# Patient Record
Sex: FEMALE | Race: BLACK OR AFRICAN AMERICAN | Employment: PART TIME | URBAN - METROPOLITAN AREA
[De-identification: names, ages, dates, MRNs, and addresses within clinical notes are randomized per-mention and may not be internally consistent; named-entity substitution may affect disease eponyms.]

---

## 2021-12-29 ENCOUNTER — OFFICE VISIT (OUTPATIENT)
Dept: OBGYN CLINIC | Age: 21
End: 2021-12-29
Payer: MEDICAID

## 2021-12-29 VITALS — WEIGHT: 115 LBS | DIASTOLIC BLOOD PRESSURE: 71 MMHG | SYSTOLIC BLOOD PRESSURE: 117 MMHG

## 2021-12-29 DIAGNOSIS — R87.612 LOW GRADE SQUAMOUS INTRAEPITHELIAL LESION ON CYTOLOGIC SMEAR OF CERVIX (LGSIL): Primary | ICD-10-CM

## 2021-12-29 PROCEDURE — 57454 BX/CURETT OF CERVIX W/SCOPE: CPT | Performed by: OBSTETRICS & GYNECOLOGY

## 2021-12-29 RX ORDER — IBUPROFEN 800 MG/1
800 TABLET ORAL
Qty: 25 TABLET | Refills: 0 | Status: SHIPPED | OUTPATIENT
Start: 2021-12-29

## 2021-12-29 NOTE — PROGRESS NOTES
Subjective:  Chief Complaints:        1. Recent abnormal pap smear. 2. Here for Colposcopy.:    HPI:         Ryann Solomon is a 24 y.o. female who came in today for colposcopy after abnormal pap smear findings. Patient is doing well today and has no complaints. ROS:  RESPIRATORY:     Negative for shortness of breath, chest pain, chest congestion, cough. CARDIOLOGY:    Negative for dizziness, chest pain, palpitations. FEMALE REPRODUCTIVE:    Negative for abnormal vaginal discharge, abnormal vaginal bleeding. UROLOGY:    Negative for difficulty urinating, voiding dysfunction, frequent urination, dysuria. Gyn History:    OB History:  OB History    Para Term  AB Living   1       1     SAB IAB Ectopic Molar Multiple Live Births     1              # Outcome Date GA Lbr Alejandro/2nd Weight Sex Delivery Anes PTL Lv   1 IAB                No current outpatient medications on file. No current facility-administered medications for this visit. Objective:  Physical Examination:  GENERAL:     General Appearance: well-appearing, well-developed, no acute distress. Hygiene: unremarkable. Mental Status:  alert and oriented. Mood/Affect:  pleasant. Speech: unremarkable. HEART:     Rhythm:  regular. Rate:  normal.  LUNGS:     Auscultation:  CTA bilaterally, no wheezing/rhonchi/rales. ABDOMEN:       Guarding:  none. Tenderness:  none. Masses:  none palpable. Inguinal nodes:  not enlarged. Ascites:  none. Bowel sounds:  normoactive. Distention:  none. Rebound tenderness:  none. RECTUM:     Anus:  no fissures, unremarkable. GENITOURINARY - FEMALE:     Vagina:  pink/moist mucosa, no gross evidence of lesions, no abnormal discharge. Cervix/cuff: normal appearance, no lesions/discharge/bleeding:  Colposcopy performed: see Procedure. External genitalia: normal.    Assessment:  The encounter diagnosis was Low grade squamous intraepithelial lesion on cytologic smear of cervix (LGSIL). Plan:  Pap smear results reviewed with pt. Colposcopy done today. Discussed importance of strict followup and to avoid smoking: Depending on severity, followup may be every 4-6 months or sooner if higher grade and may need immediate treatment. Goal is to prevent progression to Cervical Cancer. Discussed immunes system boosters such as adequate sleep; daily multivitamins, diet rich in wholesome nutritional foods and antioxidants and safe sex practices. Patient expressed understanding; All questions answered. Procedures:  Colposcopy:  Informed consent Risk, complications, benefits and alternatives discussed with patient, Consent obtained, 30 second time out taken. Pregnancy status negative. Negative Pregnancy Test.  Colposcopy procedure Acetic Acid 4-6% solution applied to cervix, Endocervical Curreting was performed, Biopsy(ies) taken at 5:30 o'clock. ,Monsels paste applied to biopsy site(S),. Post Colposcopy Hemostasis was assured, Patient tolerated the procedure well, Instructed nothing in vagina for 4-6 days, Stressed importance of strict followup, Discussed importance of NOT SMOKING. Gardasil discussed in patients under 32years old, discussed that Gardasil (HPV Vaccine) can still offer protection against up to 70% of HPV types tht cause wqrts or cervical cancer. No orders of the defined types were placed in this encounter. Preventive:  If < 32years old, discussed HPV/Cervical CA prevention; Implications of Gardasil Vaccine and May prevent infection of HPV types that cause 70% of warts or cervical dysplasias, however, Yearly PAPs with HPV DNA testing is still necessary. This can be inquired at [facility].; Discussed need to quit smoking if smoker. Discussed importance of strict followup PAPs and colposcopies as recommended.     Follow Up: Biopsy result

## 2022-01-03 LAB
CPT CODES, 490044: ABNORMAL
CPT CODES, 490044: NORMAL
CPT DISCLAIMER: ABNORMAL
CPT DISCLAIMER: NORMAL
CYTOLOGY SPEC DOC CYTO: ABNORMAL
CYTOLOGY SPEC DOC CYTO: NORMAL
DIAGNOSIS SYNOPSIS:: ABNORMAL
DIAGNOSIS SYNOPSIS:: NORMAL
DX ICD CODE: ABNORMAL
DX ICD CODE: ABNORMAL
PATH REPORT.GROSS SPEC: ABNORMAL
PATH REPORT.GROSS SPEC: NORMAL
PATH REPORT.RELEVANT HX SPEC: ABNORMAL
PATH REPORT.RELEVANT HX SPEC: NORMAL
PATHOLOGIST NAME: ABNORMAL
PATHOLOGIST NAME: NORMAL
SPECIMEN SOURCE: ABNORMAL
SPECIMEN SOURCE: NORMAL

## 2022-01-07 NOTE — PROGRESS NOTES
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 She has been scheduled for her pre-op appt on 01/20/22.

## 2022-01-12 ENCOUNTER — HOSPITAL ENCOUNTER (EMERGENCY)
Age: 22
Discharge: HOME | End: 2022-01-12
Attending: EMERGENCY MEDICINE
Payer: MEDICAID

## 2022-01-12 VITALS
HEART RATE: 68 BPM | BODY MASS INDEX: 18.83 KG/M2 | OXYGEN SATURATION: 100 % | DIASTOLIC BLOOD PRESSURE: 59 MMHG | SYSTOLIC BLOOD PRESSURE: 95 MMHG | RESPIRATION RATE: 16 BRPM | WEIGHT: 120 LBS | HEIGHT: 67 IN | TEMPERATURE: 98.4 F

## 2022-01-12 DIAGNOSIS — S69.92XA INJURY OF NAIL BED OF FINGER OF LEFT HAND, INITIAL ENCOUNTER: Primary | ICD-10-CM

## 2022-01-12 PROCEDURE — 74011250637 HC RX REV CODE- 250/637: Performed by: EMERGENCY MEDICINE

## 2022-01-12 PROCEDURE — 99283 EMERGENCY DEPT VISIT LOW MDM: CPT

## 2022-01-12 PROCEDURE — 74011000250 HC RX REV CODE- 250: Performed by: EMERGENCY MEDICINE

## 2022-01-12 RX ORDER — BACITRACIN ZINC 500 [USP'U]/G
1 CREAM TOPICAL
Status: COMPLETED | OUTPATIENT
Start: 2022-01-12 | End: 2022-01-12

## 2022-01-12 RX ORDER — HYDROCODONE BITARTRATE AND ACETAMINOPHEN 5; 325 MG/1; MG/1
1 TABLET ORAL ONCE
Status: COMPLETED | OUTPATIENT
Start: 2022-01-12 | End: 2022-01-12

## 2022-01-12 RX ORDER — BACITRACIN 500 UNIT/G
1 PACKET (EA) TOPICAL 3 TIMES DAILY
Status: DISCONTINUED | OUTPATIENT
Start: 2022-01-12 | End: 2022-01-12

## 2022-01-12 RX ORDER — BACITRACIN 500 [USP'U]/G
OINTMENT TOPICAL 3 TIMES DAILY
Qty: 1 EACH | Refills: 0 | Status: SHIPPED | OUTPATIENT
Start: 2022-01-12 | End: 2022-01-22

## 2022-01-12 RX ADMIN — Medication 1 PACKET: at 15:45

## 2022-01-12 RX ADMIN — HYDROCODONE BITARTRATE AND ACETAMINOPHEN 1 TABLET: 5; 325 TABLET ORAL at 15:32
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
## 2022-01-12 NOTE — DISCHARGE INSTRUCTIONS
You were seen in the ER for your nailbed injury. Thankfully, this was not so severe that we had to take it off and repair the nailbed. However, by treating it conservatively, there is some risk of having a nail deformity as it grows back. You should use the antibiotic ointment we have given you on the exposed nailbed for the next week and follow up with the hand surgeon we arranged for you to make sure it is healing well and does not require the nail to be completely removed. Take tylenol or ibuprofen for pain and watch out for signs of infection like worsening pain, redness, purulent discharge. Return to the ER for any of these symptoms or any other new or concerning symptoms. Thank you! Thank you for allowing me to care for you in the emergency department. I sincerely hope that you are satisfied with your visit today. It is my goal to provide you with excellent care. Below you will find a list of your labs and imaging from your visit today. Should you have any questions regarding these results please do not hesitate to call the emergency department. Labs -   No results found for this or any previous visit (from the past 12 hour(s)). Radiologic Studies -   No orders to display     CT Results  (Last 48 hours)      None          CXR Results  (Last 48 hours)      None               If you feel that you have not received excellent quality care or timely care, please ask to speak to the nurse manager. Please choose us in the future for your continued health care needs. ------------------------------------------------------------------------------------------------------------  The exam and treatment you received in the Emergency Department were for an urgent problem and are not intended as complete care.  It is important that you follow-up with a doctor, nurse practitioner, or physician assistant to:  (1) confirm your diagnosis,  (2) re-evaluation of changes in your illness and treatment, and  (3) for ongoing care. If your symptoms become worse or you do not improve as expected and you are unable to reach your usual health care provider, you should return to the Emergency Department. We are available 24 hours a day. Please take your discharge instructions with you when you go to your follow-up appointment. If you have any problem arranging a follow-up appointment, contact the Emergency Department immediately. If a prescription has been provided, please have it filled as soon as possible to prevent a delay in treatment. Read the entire medication instruction sheet provided to you by the pharmacy. If you have any questions or reservations about taking the medication due to side effects or interactions with other medications, please call your primary care physician or contact the ER to speak with the charge nurse. Make an appointment with your family doctor or the physician you were referred to for follow-up of this visit as instructed on your discharge paperwork, as this is a mandatory follow-up. Return to the ER if you are unable to be seen or if you are unable to be seen in a timely manner. If you have any problem arranging the follow-up visit, contact the Emergency Department immediately.

## 2022-01-19 ENCOUNTER — HOSPITAL ENCOUNTER (EMERGENCY)
Age: 22
Discharge: HOME | End: 2022-01-19
Attending: FAMILY MEDICINE
Payer: MEDICAID

## 2022-01-19 VITALS
WEIGHT: 120 LBS | SYSTOLIC BLOOD PRESSURE: 140 MMHG | BODY MASS INDEX: 19.29 KG/M2 | DIASTOLIC BLOOD PRESSURE: 95 MMHG | HEART RATE: 80 BPM | TEMPERATURE: 98.9 F | RESPIRATION RATE: 16 BRPM | OXYGEN SATURATION: 99 % | HEIGHT: 66 IN

## 2022-01-19 DIAGNOSIS — N30.01 ACUTE CYSTITIS WITH HEMATURIA: Primary | ICD-10-CM

## 2022-01-19 DIAGNOSIS — F41.1 ANXIETY STATE: ICD-10-CM

## 2022-01-19 LAB
ALBUMIN SERPL-MCNC: 3.9 G/DL (ref 3.5–5)
ALBUMIN/GLOB SERPL: 1 {RATIO} (ref 1.1–2.2)
ALP SERPL-CCNC: 52 U/L (ref 45–117)
ALT SERPL-CCNC: 18 U/L (ref 12–78)
ANION GAP SERPL CALC-SCNC: 14 MMOL/L (ref 5–15)
APPEARANCE UR: ABNORMAL
AST SERPL W P-5'-P-CCNC: 26 U/L (ref 15–37)
ATRIAL RATE: 55 BPM
BACTERIA URNS QL MICRO: ABNORMAL /HPF
BASOPHILS # BLD: 0 K/UL (ref 0–0.1)
BASOPHILS NFR BLD: 0 % (ref 0–1)
BILIRUB SERPL-MCNC: 1 MG/DL (ref 0.2–1)
BILIRUB UR QL: ABNORMAL
BUN SERPL-MCNC: 18 MG/DL (ref 6–20)
BUN/CREAT SERPL: 19 (ref 12–20)
CA-I BLD-MCNC: 8.6 MG/DL (ref 8.5–10.1)
CALCULATED P AXIS, ECG09: 54 DEGREES
CALCULATED R AXIS, ECG10: 59 DEGREES
CALCULATED T AXIS, ECG11: 51 DEGREES
CHLORIDE SERPL-SCNC: 100 MMOL/L (ref 97–108)
CO2 SERPL-SCNC: 21 MMOL/L (ref 21–32)
COLOR UR: ABNORMAL
CREAT SERPL-MCNC: 0.95 MG/DL (ref 0.55–1.02)
DIAGNOSIS, 93000: NORMAL
DIFFERENTIAL METHOD BLD: NORMAL
EOSINOPHIL # BLD: 0 K/UL (ref 0–0.4)
EOSINOPHIL NFR BLD: 0 % (ref 0–7)
EPITH CASTS URNS QL MICRO: ABNORMAL /LPF
ERYTHROCYTE [DISTWIDTH] IN BLOOD BY AUTOMATED COUNT: 12.5 % (ref 11.5–14.5)
GLOBULIN SER CALC-MCNC: 4.1 G/DL (ref 2–4)
GLUCOSE SERPL-MCNC: 79 MG/DL (ref 65–100)
GLUCOSE UR STRIP.AUTO-MCNC: NEGATIVE MG/DL
HCG UR QL: NEGATIVE
HCT VFR BLD AUTO: 38.8 % (ref 35–47)
HGB BLD-MCNC: 13.3 G/DL (ref 11.5–16)
HGB UR QL STRIP: ABNORMAL
IMM GRANULOCYTES # BLD AUTO: 0 K/UL (ref 0–0.04)
IMM GRANULOCYTES NFR BLD AUTO: 0 % (ref 0–0.5)
KETONES UR QL STRIP.AUTO: >80 MG/DL
LEUKOCYTE ESTERASE UR QL STRIP.AUTO: ABNORMAL
LYMPHOCYTES # BLD: 2 K/UL (ref 0.8–3.5)
LYMPHOCYTES NFR BLD: 29 % (ref 12–49)
MCH RBC QN AUTO: 33.8 PG (ref 26–34)
MCHC RBC AUTO-ENTMCNC: 34.3 G/DL (ref 30–36.5)
MCV RBC AUTO: 98.7 FL (ref 80–99)
MONOCYTES # BLD: 0.3 K/UL (ref 0–1)
MONOCYTES NFR BLD: 5 % (ref 5–13)
NEUTS SEG # BLD: 4.7 K/UL (ref 1.8–8)
NEUTS SEG NFR BLD: 66 % (ref 32–75)
NITRITE UR QL STRIP.AUTO: POSITIVE
P-R INTERVAL, ECG05: 134 MS
PH UR STRIP: 5 [PH] (ref 5–8)
PLATELET # BLD AUTO: 242 K/UL (ref 150–400)
PMV BLD AUTO: 9.3 FL (ref 8.9–12.9)
POTASSIUM SERPL-SCNC: 4.2 MMOL/L (ref 3.5–5.1)
PROT SERPL-MCNC: 8 G/DL (ref 6.4–8.2)
PROT UR STRIP-MCNC: >300 MG/DL
Q-T INTERVAL, ECG07: 420 MS
QRS DURATION, ECG06: 74 MS
QTC CALCULATION (BEZET), ECG08: 401 MS
RBC # BLD AUTO: 3.93 M/UL (ref 3.8–5.2)
RBC #/AREA URNS HPF: ABNORMAL /HPF (ref 0–5)
SODIUM SERPL-SCNC: 135 MMOL/L (ref 136–145)
SP GR UR REFRACTOMETRY: 1.02 (ref 1–1.03)
UROBILINOGEN UR QL STRIP.AUTO: 1 EU/DL (ref 0.2–1)
VENTRICULAR RATE, ECG03: 55 BPM
WBC # BLD AUTO: 7.1 K/UL (ref 3.6–11)
WBC URNS QL MICRO: ABNORMAL /HPF (ref 0–4)

## 2022-01-19 PROCEDURE — 93005 ELECTROCARDIOGRAM TRACING: CPT

## 2022-01-19 PROCEDURE — 99285 EMERGENCY DEPT VISIT HI MDM: CPT

## 2022-01-19 PROCEDURE — 80053 COMPREHEN METABOLIC PANEL: CPT

## 2022-01-19 PROCEDURE — 74011000250 HC RX REV CODE- 250: Performed by: FAMILY MEDICINE

## 2022-01-19 PROCEDURE — 85025 COMPLETE CBC W/AUTO DIFF WBC: CPT

## 2022-01-19 PROCEDURE — 81001 URINALYSIS AUTO W/SCOPE: CPT

## 2022-01-19 PROCEDURE — 96374 THER/PROPH/DIAG INJ IV PUSH: CPT

## 2022-01-19 PROCEDURE — 81025 URINE PREGNANCY TEST: CPT

## 2022-01-19 PROCEDURE — 36415 COLL VENOUS BLD VENIPUNCTURE: CPT

## 2022-01-19 PROCEDURE — 96361 HYDRATE IV INFUSION ADD-ON: CPT

## 2022-01-19 PROCEDURE — 74011250636 HC RX REV CODE- 250/636: Performed by: FAMILY MEDICINE

## 2022-01-19 PROCEDURE — 74011250637 HC RX REV CODE- 250/637: Performed by: FAMILY MEDICINE

## 2022-01-19 RX ORDER — CEPHALEXIN 500 MG/1
500 CAPSULE ORAL 2 TIMES DAILY
Qty: 10 CAPSULE | Refills: 0 | Status: SHIPPED | OUTPATIENT
Start: 2022-01-19 | End: 2022-01-24

## 2022-01-19 RX ORDER — HYDROXYZINE 50 MG/1
50 TABLET, FILM COATED ORAL
Qty: 15 TABLET | Refills: 0 | Status: SHIPPED | OUTPATIENT
Start: 2022-01-19 | End: 2022-01-29

## 2022-01-19 RX ORDER — ONDANSETRON 2 MG/ML
4 INJECTION INTRAMUSCULAR; INTRAVENOUS
Status: COMPLETED | OUTPATIENT
Start: 2022-01-19 | End: 2022-01-19

## 2022-01-19 RX ORDER — LIDOCAINE HYDROCHLORIDE 20 MG/ML
15 SOLUTION OROPHARYNGEAL AS NEEDED
Status: DISCONTINUED | OUTPATIENT
Start: 2022-01-19 | End: 2022-01-19 | Stop reason: HOSPADM

## 2022-01-19 RX ORDER — MAG HYDROX/ALUMINUM HYD/SIMETH 200-200-20
30 SUSPENSION, ORAL (FINAL DOSE FORM) ORAL
Status: DISCONTINUED | OUTPATIENT
Start: 2022-01-19 | End: 2022-01-19 | Stop reason: HOSPADM

## 2022-01-19 RX ORDER — HYDROXYZINE 25 MG/1
25 TABLET, FILM COATED ORAL ONCE
Status: COMPLETED | OUTPATIENT
Start: 2022-01-19 | End: 2022-01-19

## 2022-01-19 RX ORDER — BICTEGRAVIR SODIUM, EMTRICITABINE, AND TENOFOVIR ALAFENAMIDE FUMARATE 50; 200; 25 MG/1; MG/1; MG/1
1 TABLET ORAL DAILY
COMMUNITY
Start: 2021-12-15

## 2022-01-19 RX ADMIN — SODIUM CHLORIDE 1000 ML: 9 INJECTION, SOLUTION INTRAVENOUS at 13:28

## 2022-01-19 RX ADMIN — LIDOCAINE HYDROCHLORIDE 15 ML: 20 SOLUTION ORAL; TOPICAL at 13:45

## 2022-01-19 RX ADMIN — HYDROXYZINE HYDROCHLORIDE 25 MG: 25 TABLET, FILM COATED ORAL at 15:05

## 2022-01-19 RX ADMIN — ONDANSETRON 4 MG: 2 INJECTION INTRAMUSCULAR; INTRAVENOUS at 13:44

## 2022-01-19 RX ADMIN — ALUMINUM HYDROXIDE, MAGNESIUM HYDROXIDE, AND SIMETHICONE 30 ML: 200; 200; 20 SUSPENSION ORAL at 13:45

## 2022-01-19 NOTE — DISCHARGE INSTRUCTIONS
Thank you! Thank you for allowing me to care for you in the emergency department. I sincerely hope that you are satisfied with your visit today. It is my goal to provide you with excellent care. Below you will find a list of your labs and imaging from your visit today. Should you have any questions regarding these results please do not hesitate to call the emergency department. Labs -     Recent Results (from the past 12 hour(s))   CBC WITH AUTOMATED DIFF    Collection Time: 01/19/22  1:25 PM   Result Value Ref Range    WBC 7.1 3.6 - 11.0 K/uL    RBC 3.93 3.80 - 5.20 M/uL    HGB 13.3 11.5 - 16.0 g/dL    HCT 38.8 35.0 - 47.0 %    MCV 98.7 80.0 - 99.0 FL    MCH 33.8 26.0 - 34.0 PG    MCHC 34.3 30.0 - 36.5 g/dL    RDW 12.5 11.5 - 14.5 %    PLATELET 167 523 - 475 K/uL    MPV 9.3 8.9 - 12.9 FL    NEUTROPHILS 66 32 - 75 %    LYMPHOCYTES 29 12 - 49 %    MONOCYTES 5 5 - 13 %    EOSINOPHILS 0 0 - 7 %    BASOPHILS 0 0 - 1 %    IMMATURE GRANULOCYTES 0 0.0 - 0.5 %    ABS. NEUTROPHILS 4.7 1.8 - 8.0 K/UL    ABS. LYMPHOCYTES 2.0 0.8 - 3.5 K/UL    ABS. MONOCYTES 0.3 0.0 - 1.0 K/UL    ABS. EOSINOPHILS 0.0 0.0 - 0.4 K/UL    ABS. BASOPHILS 0.0 0.0 - 0.1 K/UL    ABS. IMM. GRANS. 0.0 0.00 - 0.04 K/UL    DF AUTOMATED     METABOLIC PANEL, COMPREHENSIVE    Collection Time: 01/19/22  1:25 PM   Result Value Ref Range    Sodium 135 (L) 136 - 145 mmol/L    Potassium 4.2 3.5 - 5.1 mmol/L    Chloride 100 97 - 108 mmol/L    CO2 21 21 - 32 mmol/L    Anion gap 14 5 - 15 mmol/L    Glucose 79 65 - 100 mg/dL    BUN 18 6 - 20 mg/dL    Creatinine 0.95 0.55 - 1.02 mg/dL    BUN/Creatinine ratio 19 12 - 20      GFR est AA >60 >60 ml/min/1.73m2    GFR est non-AA >60 >60 ml/min/1.73m2    Calcium 8.6 8.5 - 10.1 mg/dL    Bilirubin, total 1.0 0.2 - 1.0 mg/dL    AST (SGOT) 26 15 - 37 U/L    ALT (SGPT) 18 12 - 78 U/L    Alk.  phosphatase 52 45 - 117 U/L    Protein, total 8.0 6.4 - 8.2 g/dL    Albumin 3.9 3.5 - 5.0 g/dL    Globulin 4.1 (H) 2.0 - 4.0 g/dL A-G Ratio 1.0 (L) 1.1 - 2.2     URINALYSIS W/ RFLX MICROSCOPIC    Collection Time: 01/19/22  2:57 PM   Result Value Ref Range    Color Red      Appearance Cloudy (A) Clear      Specific gravity 1.020 1.003 - 1.030      pH (UA) 5.0 5.0 - 8.0      Protein >300 (A) Negative mg/dL    Glucose Negative Negative mg/dL    Ketone >80 (A) Negative mg/dL    Bilirubin Small (A) Negative      Blood Large (A) Negative      Urobilinogen 1.0 0.2 - 1.0 EU/dL    Nitrites Positive (A) Negative      Leukocyte Esterase Small (A) Negative     HCG URINE, QL    Collection Time: 01/19/22  2:57 PM   Result Value Ref Range    HCG urine, QL Negative Negative     URINE MICROSCOPIC    Collection Time: 01/19/22  2:57 PM   Result Value Ref Range    WBC 5-10 0 - 4 /hpf    RBC  0 - 5 /hpf    Epithelial cells Moderate (A) Few /lpf    Bacteria 2+ (A) Negative /hpf       Radiologic Studies -   No orders to display     CT Results  (Last 48 hours)      None          CXR Results  (Last 48 hours)      None               If you feel that you have not received excellent quality care or timely care, please ask to speak to the nurse manager. Please choose us in the future for your continued health care needs. ------------------------------------------------------------------------------------------------------------  The exam and treatment you received in the Emergency Department were for an urgent problem and are not intended as complete care. It is important that you follow-up with a doctor, nurse practitioner, or physician assistant to:  (1) confirm your diagnosis,  (2) re-evaluation of changes in your illness and treatment, and  (3) for ongoing care. If your symptoms become worse or you do not improve as expected and you are unable to reach your usual health care provider, you should return to the Emergency Department. We are available 24 hours a day.      Please take your discharge instructions with you when you go to your follow-up appointment. If you have any problem arranging a follow-up appointment, contact the Emergency Department immediately. If a prescription has been provided, please have it filled as soon as possible to prevent a delay in treatment. Read the entire medication instruction sheet provided to you by the pharmacy. If you have any questions or reservations about taking the medication due to side effects or interactions with other medications, please call your primary care physician or contact the ER to speak with the charge nurse. Make an appointment with your family doctor or the physician you were referred to for follow-up of this visit as instructed on your discharge paperwork, as this is a mandatory follow-up. Return to the ER if you are unable to be seen or if you are unable to be seen in a timely manner. If you have any problem arranging the follow-up visit, contact the Emergency Department immediately.
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
## 2022-02-25 ENCOUNTER — HOSPITAL ENCOUNTER (EMERGENCY)
Age: 22
Discharge: HOME | End: 2022-02-25
Attending: FAMILY MEDICINE
Payer: MEDICAID

## 2022-02-25 VITALS
RESPIRATION RATE: 18 BRPM | WEIGHT: 114.2 LBS | BODY MASS INDEX: 17.92 KG/M2 | DIASTOLIC BLOOD PRESSURE: 72 MMHG | SYSTOLIC BLOOD PRESSURE: 105 MMHG | HEART RATE: 70 BPM | OXYGEN SATURATION: 100 % | HEIGHT: 67 IN | TEMPERATURE: 98.1 F

## 2022-02-25 DIAGNOSIS — T14.8XXA SPLINTER: Primary | ICD-10-CM

## 2022-02-25 PROCEDURE — 99282 EMERGENCY DEPT VISIT SF MDM: CPT

## 2022-02-25 NOTE — ED PROVIDER NOTES
EMERGENCY DEPARTMENT HISTORY AND PHYSICAL EXAM      Date: 2/25/2022  Patient Name: Lovely Kussmaul    History of Presenting Illness     Chief complaint: Splinter  History Provided By:     HPI: Lovely Kussmaul, is an extremely pleasant 25 y.o. female presenting to the ED with a chief complaint of splinter. Patient states prior to arrival she is dropped on a piece of wood and noticed a splinter in the heel of her right foot. Difficulty getting it out. Denies other complaints. There are no other complaints, changes, or physical findings at this time. PCP: Narda Hager NP    No current facility-administered medications on file prior to encounter. Current Outpatient Medications on File Prior to Encounter   Medication Sig Dispense Refill    Biktarvy tab tablet Take 1 Tablet by mouth daily.  ibuprofen (MOTRIN) 800 mg tablet Take 1 Tablet by mouth every six (6) hours as needed for Pain. 25 Tablet 0       Past History     Past Medical History:  Past Medical History:   Diagnosis Date    HIV (human immunodeficiency virus infection) ([facility] [address]        Past Surgical History:  History reviewed. No pertinent surgical history. Family History:  History reviewed. No pertinent family history. Social History:  Social History     Tobacco Use    Smoking status: Never Smoker    Smokeless tobacco: Never Used   Substance Use Topics    Alcohol use: Not on file    Drug use: Yes     Types: Marijuana     Comment: daily       Allergies:  No Known Allergies      Review of Systems     Review of Systems   Constitutional: Negative for activity change, appetite change, chills, fatigue and fever. HENT: Negative for congestion and sore throat. Eyes: Negative for photophobia and visual disturbance. Respiratory: Negative for cough, shortness of breath and wheezing. Cardiovascular: Negative for chest pain, palpitations and leg swelling. Gastrointestinal: Negative for abdominal pain, diarrhea, nausea and vomiting. Endocrine: Negative for cold intolerance and heat intolerance. Musculoskeletal: Negative for gait problem and joint swelling. Skin: Negative for color change and rash. Splinter   Neurological: Negative for dizziness and headaches. Physical Exam     Physical Exam  Constitutional:       Appearance: She is well-developed. HENT:      Head: Normocephalic and atraumatic. Mouth/Throat:      Mouth: Mucous membranes are moist.      Pharynx: Oropharynx is clear. Eyes:      Conjunctiva/sclera: Conjunctivae normal.      Pupils: Pupils are equal, round, and reactive to light. Cardiovascular:      Rate and Rhythm: Normal rate and regular rhythm. Heart sounds: No murmur heard. Pulmonary:      Effort: No respiratory distress. Breath sounds: No stridor. No wheezing, rhonchi or rales. Abdominal:      General: There is no distension. Tenderness: There is no abdominal tenderness. There is no rebound. Musculoskeletal:      Cervical back: Normal range of motion and neck supple. Skin:     General: Skin is warm and dry. Comments: 5 mm x 1 mm splinter right heel superficial   Neurological:      General: No focal deficit present. Mental Status: She is alert and oriented to person, place, and time. Psychiatric:         Mood and Affect: Mood normal.         Behavior: Behavior normal.         Lab and Diagnostic Study Results     Labs -   No results found for this or any previous visit (from the past 12 hour(s)). Radiologic Studies -   @lastxrresult@  CT Results  (Last 48 hours)    None        CXR Results  (Last 48 hours)    None            Medical Decision Making   - I am the first provider for this patient. - I reviewed the vital signs, available nursing notes, past medical history, past surgical history, family history and social history. - Initial assessment performed.  The patients presenting problems have been discussed, and they are in agreement with the care plan formulated and outlined with them. I have encouraged them to ask questions as they arise throughout their visit. Vital Signs-Reviewed the patient's vital signs. Patient Vitals for the past 12 hrs:   Temp Pulse Resp BP SpO2   02/25/22 1036 98.1 °F (36.7 °C) 70 18 105/72 100 %         ED Course/ Provider Notes (Medical Decision Making):     Patient presented to the emergency department with a chief complaint of splinter. On examination the patient is nontoxic and well-appearing. Vitals were reviewed per above. Splinter was removed with tweezers. Patient tolerated this well. Henri Marquez was given a thorough list of signs and symptoms that would warrant an immediate return to the emergency department. Otherwise Henri Marquez will follow up with PCP. Procedures   Medical Decision Makingedical Decision Making  Performed by: Juliocesar Rodriguez, DO  Procedures  None       Disposition   Disposition:     Home    All of the diagnostic tests were reviewed and questions answered. Diagnosis, care plan and treatment options were discussed. The patient understands the instructions and will follow up as directed. The patients results have been reviewed with them. They have been counseled regarding their diagnosis. The patient verbally convey understanding and agreement of the signs, symptoms, diagnosis, treatment and prognosis and additionally agrees to follow up as recommended with their PCP in 24 - 48 hours. They also agree with the care-plan and convey that all of their questions have been answered. I have also put together some discharge instructions for them that include: 1) educational information regarding their diagnosis, 2) how to care for their diagnosis at home, as well a 3) list of reasons why they would want to return to the ED prior to their follow-up appointment, should their condition change. DISCHARGE PLAN:    1.    Current Discharge Medication List      CONTINUE these medications which have NOT CHANGED    Details   Biktarvy tab tablet Take 1 Tablet by mouth daily. ibuprofen (MOTRIN) 800 mg tablet Take 1 Tablet by mouth every six (6) hours as needed for Pain. Qty: 25 Tablet, Refills: 0               2.   Follow-up Information     Follow up With Specialties Details Why Contact Info    General Johanna NP Nurse Practitioner               3.  Return to ED if worse       4. Current Discharge Medication List            Diagnosis     Clinical Impression:    1. Splinter        Attestations:    Lesly Monson, DO    Please note that this dictation was completed with [employer], the computer voice recognition software. Quite often unanticipated grammatical, syntax, homophones, and other interpretive errors are inadvertently transcribed by the computer software. Please disregard these errors. Please excuse any errors that have escaped final proofreading. Thank you.

## 2022-02-25 NOTE — Clinical Note
[address] [facility] EMERGENCY DEPARTMENT  [address] [id number]  [phone]    Work/School Note    Date: 2/25/2022    To Whom It May concern:      Anette Kelly was seen and treated today in the emergency room by the following provider(s):  Attending Provider: Carroll Worthy is excused from work/school on 02/25/22. She is clear to return to work/school on 02/26/22.         Sincerely,          Gibran Wesley, DO

## 2022-06-16 ENCOUNTER — HOSPITAL ENCOUNTER (EMERGENCY)
Age: 22
Discharge: HOME | End: 2022-06-16
Attending: EMERGENCY MEDICINE
Payer: MEDICAID

## 2022-06-16 VITALS
HEIGHT: 67 IN | WEIGHT: 118.8 LBS | SYSTOLIC BLOOD PRESSURE: 102 MMHG | RESPIRATION RATE: 18 BRPM | OXYGEN SATURATION: 100 % | TEMPERATURE: 98.3 F | HEART RATE: 69 BPM | BODY MASS INDEX: 18.65 KG/M2 | DIASTOLIC BLOOD PRESSURE: 72 MMHG

## 2022-06-16 DIAGNOSIS — S90.851A SPLINTER OF RIGHT FOOT WITHOUT INFECTION, INITIAL ENCOUNTER: Primary | ICD-10-CM

## 2022-06-16 PROCEDURE — 99282 EMERGENCY DEPT VISIT SF MDM: CPT

## 2022-06-16 PROCEDURE — 74011000250 HC RX REV CODE- 250: Performed by: EMERGENCY MEDICINE

## 2022-06-16 RX ORDER — LIDOCAINE HYDROCHLORIDE AND EPINEPHRINE 10; 10 MG/ML; UG/ML
1.5 INJECTION, SOLUTION INFILTRATION; PERINEURAL ONCE
Status: COMPLETED | OUTPATIENT
Start: 2022-06-16 | End: 2022-06-16

## 2022-06-16 RX ADMIN — LIDOCAINE HYDROCHLORIDE,EPINEPHRINE BITARTRATE 15 MG: 10; .01 INJECTION, SOLUTION INFILTRATION; PERINEURAL at 11:27

## 2022-06-16 NOTE — ED PROVIDER NOTES
EMERGENCY DEPARTMENT HISTORY AND PHYSICAL EXAM      Date: 6/16/2022  Patient Name: Jj Inman  Patient Age and Sex: 25 y.o. female     History of Presenting Illness     Chief Complaint   Patient presents with    Foreign Body Removal       History Provided By: Patient    HPI: Jj Inmna is a 45-year-old female presenting with splinters in her right foot. Patient states that 3 days ago she was walking with flip-flops outside and then felt the pain and states that she has 2 small wooden splinters in her lower foot. Patient states that she tried to get it out but was unable to. Patient states that she has had splinters in her foot before as recently as February and they had to numb it up and cut it out. Patient states that she feels it in that area. This did happen 3 days ago. There are no other complaints, changes, or physical findings at this time. PCP: Elizabeth Nuñez NP    No current facility-administered medications on file prior to encounter. Current Outpatient Medications on File Prior to Encounter   Medication Sig Dispense Refill    Biktarvy tab tablet Take 1 Tablet by mouth daily.  ibuprofen (MOTRIN) 800 mg tablet Take 1 Tablet by mouth every six (6) hours as needed for Pain. 25 Tablet 0       Past History     Past Medical History:  Past Medical History:   Diagnosis Date    HIV (human immunodeficiency virus infection) ([facility] [address]        Past Surgical History:  History reviewed. No pertinent surgical history. Family History:  History reviewed. No pertinent family history. Social History:  Social History     Tobacco Use    Smoking status: Never Smoker    Smokeless tobacco: Never Used   Substance Use Topics    Alcohol use: Never    Drug use: Yes     Types: Marijuana     Comment: daily       Allergies:  No Known Allergies      Review of Systems   Review of Systems   Constitutional: Negative for chills and fever. Respiratory: Negative for cough and shortness of breath. Cardiovascular: Negative for chest pain. Gastrointestinal: Negative for abdominal pain, constipation, diarrhea, nausea and vomiting. Genitourinary: Negative for dysuria, frequency and hematuria. Skin: Positive for wound. Neurological: Negative for weakness and numbness. All other systems reviewed and are negative. Physical Exam   Physical Exam  Constitutional:       General: She is not in acute distress. Appearance: She is well-developed. HENT:      Head: Normocephalic and atraumatic. Nose: Nose normal.      Mouth/Throat:      Mouth: Mucous membranes are moist.   Eyes:      Extraocular Movements: Extraocular movements intact. Conjunctiva/sclera: Conjunctivae normal.   Cardiovascular:      Comments: Well perfused  Pulmonary:      Effort: Pulmonary effort is normal. No respiratory distress. Musculoskeletal:         General: Normal range of motion. Cervical back: Normal range of motion. Skin:     Comments: On the bottom he will of the right foot, there are two 5 mm horizontal reddish flecks under the skin. Cannot feel a splinter necessarily. The red linear flecks could be scar versus the splinter. Tender to palpation. When attempted to remove with tweezers, patient pulling her foot away and not letting me touch it. Neurological:      General: No focal deficit present. Mental Status: She is alert and oriented to person, place, and time. Psychiatric:         Mood and Affect: Mood normal.          Diagnostic Study Results     Labs -   No results found for this or any previous visit (from the past 12 hour(s)). Radiologic Studies -   No orders to display     CT Results  (Last 48 hours)    None        CXR Results  (Last 48 hours)    None            Medical Decision Making   I am the first provider for this patient. I reviewed the vital signs, available nursing notes, past medical history, past surgical history, family history and social history.     Vital Signs-Reviewed the patient's vital signs. Patient Vitals for the past 12 hrs:   Temp Pulse Resp BP SpO2   06/16/22 1112 98.3 °F (36.8 °C) 69 18 102/72 100 %       Records Reviewed: Nursing Notes and Old Medical Records    Provider Notes (Medical Decision Making):   Patient presenting for 2 splinters in her right foot from 3 days ago. The flex that I see on her skin could be scarring or the actual splinter. After discussion with the patient, plan will be to numb up those 2 areas and see if we can use a scalpel to take it out. If unable to, it might have already healed over and buried in the skin. ED Course:   Initial assessment performed. The patients presenting problems have been discussed, and they are in agreement with the care plan formulated and outlined with them. I have encouraged them to ask questions as they arise throughout their visit. ED Course as of 06/16/22 1143   Thu Jun 16, 2022   1133 Lidocaine with epi was applied to one area of the splinter and patient screamed and wanted me to stop. Stated that she did not want me to continue. Does not want me to use a scalpel to remove the buried splinter. Patient has capacity to make her own decisions and verbalized that she will try to use tweezers on her own but does not want me to continue. Marcela Sam, nurse in room and was present for the conversation. [JS]      ED Course User Index  [JS] Anusha Reddy MD     Critical Care Time:   0    Disposition:  Discharge Note:  The patient has been re-evaluated and is ready for discharge. Reviewed available results with patient. Counseled patient on diagnosis and care plan. Patient has expressed understanding, and all questions have been answered. Patient agrees with plan and agrees to follow up as recommended, or to return to the ED if their symptoms worsen. Discharge instructions have been provided and explained to the patient, along with reasons to return to the ED.       PLAN:  Discharge Medication List as of 6/16/2022 11:36 AM        2. Follow-up Information     Follow up With Specialties Details Why Contact Info    Yovani Dudley, NP Nurse Practitioner  As needed         3. Return to ED if worse     Diagnosis     Clinical Impression:   1. Splinter of right foot without infection, initial encounter        Attestations:    Johana Junior M.D. Please note that this dictation was completed with [employer], the computer voice recognition software. Quite often unanticipated grammatical, syntax, homophones, and other interpretive errors are inadvertently transcribed by the computer software. Please disregard these errors. Please excuse any errors that have escaped final proofreading. Thank you.

## 2022-08-19 ENCOUNTER — HOSPITAL ENCOUNTER (EMERGENCY)
Age: 22
Discharge: HOME | End: 2022-08-19
Attending: EMERGENCY MEDICINE
Payer: MEDICAID

## 2022-08-19 ENCOUNTER — APPOINTMENT (OUTPATIENT)
Dept: CT IMAGING | Age: 22
End: 2022-08-19
Attending: EMERGENCY MEDICINE
Payer: MEDICAID

## 2022-08-19 VITALS
RESPIRATION RATE: 15 BRPM | BODY MASS INDEX: 18.05 KG/M2 | SYSTOLIC BLOOD PRESSURE: 144 MMHG | TEMPERATURE: 98.4 F | HEIGHT: 67 IN | WEIGHT: 115 LBS | OXYGEN SATURATION: 99 % | HEART RATE: 68 BPM | DIASTOLIC BLOOD PRESSURE: 88 MMHG

## 2022-08-19 DIAGNOSIS — R10.9 FLANK PAIN: Primary | ICD-10-CM

## 2022-08-19 DIAGNOSIS — K59.00 CONSTIPATION, UNSPECIFIED CONSTIPATION TYPE: ICD-10-CM

## 2022-08-19 LAB
ALBUMIN SERPL-MCNC: 4.5 G/DL (ref 3.5–5)
ALBUMIN/GLOB SERPL: 1.1 {RATIO} (ref 1.1–2.2)
ALP SERPL-CCNC: 67 U/L (ref 45–117)
ALT SERPL-CCNC: 26 U/L (ref 12–78)
ANION GAP SERPL CALC-SCNC: 12 MMOL/L (ref 5–15)
APPEARANCE UR: CLEAR
AST SERPL W P-5'-P-CCNC: 27 U/L (ref 15–37)
BACTERIA URNS QL MICRO: ABNORMAL /HPF
BASOPHILS # BLD: 0 K/UL (ref 0–0.1)
BASOPHILS NFR BLD: 0 % (ref 0–1)
BILIRUB SERPL-MCNC: 0.8 MG/DL (ref 0.2–1)
BILIRUB UR QL: ABNORMAL
BUN SERPL-MCNC: 14 MG/DL (ref 6–20)
BUN/CREAT SERPL: 16 (ref 12–20)
CA-I BLD-MCNC: 9.7 MG/DL (ref 8.5–10.1)
CHLORIDE SERPL-SCNC: 102 MMOL/L (ref 97–108)
CO2 SERPL-SCNC: 24 MMOL/L (ref 21–32)
COLOR UR: YELLOW
CREAT SERPL-MCNC: 0.87 MG/DL (ref 0.55–1.02)
DIFFERENTIAL METHOD BLD: ABNORMAL
EOSINOPHIL # BLD: 0 K/UL (ref 0–0.4)
EOSINOPHIL NFR BLD: 0 % (ref 0–7)
EPITH CASTS URNS QL MICRO: ABNORMAL /LPF
ERYTHROCYTE [DISTWIDTH] IN BLOOD BY AUTOMATED COUNT: 12.9 % (ref 11.5–14.5)
GLOBULIN SER CALC-MCNC: 4 G/DL (ref 2–4)
GLUCOSE SERPL-MCNC: 107 MG/DL (ref 65–100)
GLUCOSE UR STRIP.AUTO-MCNC: NEGATIVE MG/DL
HCG UR QL: NEGATIVE
HCT VFR BLD AUTO: 39 % (ref 35–47)
HGB BLD-MCNC: 13.1 G/DL (ref 11.5–16)
HGB UR QL STRIP: ABNORMAL
IMM GRANULOCYTES # BLD AUTO: 0 K/UL (ref 0–0.04)
IMM GRANULOCYTES NFR BLD AUTO: 0 % (ref 0–0.5)
KETONES UR QL STRIP.AUTO: 50 MG/DL
LEUKOCYTE ESTERASE UR QL STRIP.AUTO: ABNORMAL
LYMPHOCYTES # BLD: 1.4 K/UL (ref 0.8–3.5)
LYMPHOCYTES NFR BLD: 17 % (ref 12–49)
MCH RBC QN AUTO: 34.1 PG (ref 26–34)
MCHC RBC AUTO-ENTMCNC: 33.6 G/DL (ref 30–36.5)
MCV RBC AUTO: 101.6 FL (ref 80–99)
MONOCYTES # BLD: 0.3 K/UL (ref 0–1)
MONOCYTES NFR BLD: 4 % (ref 5–13)
MUCOUS THREADS URNS QL MICRO: ABNORMAL /LPF
NEUTS SEG # BLD: 6.3 K/UL (ref 1.8–8)
NEUTS SEG NFR BLD: 79 % (ref 32–75)
NITRITE UR QL STRIP.AUTO: NEGATIVE
PH UR STRIP: 5 [PH] (ref 5–8)
PLATELET # BLD AUTO: 268 K/UL (ref 150–400)
PMV BLD AUTO: 9.7 FL (ref 8.9–12.9)
POTASSIUM SERPL-SCNC: 4.1 MMOL/L (ref 3.5–5.1)
PROT SERPL-MCNC: 8.5 G/DL (ref 6.4–8.2)
PROT UR STRIP-MCNC: 30 MG/DL
RBC # BLD AUTO: 3.84 M/UL (ref 3.8–5.2)
RBC #/AREA URNS HPF: ABNORMAL /HPF (ref 0–5)
SODIUM SERPL-SCNC: 138 MMOL/L (ref 136–145)
SP GR UR REFRACTOMETRY: 1.02 (ref 1–1.03)
UA: UC IF INDICATED,UAUC: ABNORMAL
UROBILINOGEN UR QL STRIP.AUTO: 1 EU/DL (ref 0.2–1)
WBC # BLD AUTO: 8 K/UL (ref 3.6–11)
WBC URNS QL MICRO: ABNORMAL /HPF (ref 0–4)

## 2022-08-19 PROCEDURE — 81025 URINE PREGNANCY TEST: CPT

## 2022-08-19 PROCEDURE — 74176 CT ABD & PELVIS W/O CONTRAST: CPT

## 2022-08-19 PROCEDURE — 96375 TX/PRO/DX INJ NEW DRUG ADDON: CPT

## 2022-08-19 PROCEDURE — 96374 THER/PROPH/DIAG INJ IV PUSH: CPT

## 2022-08-19 PROCEDURE — 74011250636 HC RX REV CODE- 250/636: Performed by: EMERGENCY MEDICINE

## 2022-08-19 PROCEDURE — 81001 URINALYSIS AUTO W/SCOPE: CPT

## 2022-08-19 PROCEDURE — 80053 COMPREHEN METABOLIC PANEL: CPT

## 2022-08-19 PROCEDURE — 85025 COMPLETE CBC W/AUTO DIFF WBC: CPT

## 2022-08-19 PROCEDURE — 99284 EMERGENCY DEPT VISIT MOD MDM: CPT

## 2022-08-19 RX ORDER — ONDANSETRON 2 MG/ML
4 INJECTION INTRAMUSCULAR; INTRAVENOUS ONCE
Status: COMPLETED | OUTPATIENT
Start: 2022-08-19 | End: 2022-08-19

## 2022-08-19 RX ORDER — KETOROLAC TROMETHAMINE 10 MG/1
10 TABLET, FILM COATED ORAL
Qty: 8 TABLET | Refills: 0 | Status: SHIPPED | OUTPATIENT
Start: 2022-08-19 | End: 2022-08-21

## 2022-08-19 RX ORDER — POLYETHYLENE GLYCOL 3350 17 G/17G
17 POWDER, FOR SOLUTION ORAL DAILY
Qty: 289 G | Refills: 0 | Status: SHIPPED | OUTPATIENT
Start: 2022-08-19 | End: 2022-09-05

## 2022-08-19 RX ORDER — KETOROLAC TROMETHAMINE 15 MG/ML
15 INJECTION, SOLUTION INTRAMUSCULAR; INTRAVENOUS
Status: COMPLETED | OUTPATIENT
Start: 2022-08-19 | End: 2022-08-19

## 2022-08-19 RX ADMIN — ONDANSETRON 4 MG: 2 INJECTION INTRAMUSCULAR; INTRAVENOUS at 13:34

## 2022-08-19 RX ADMIN — SODIUM CHLORIDE 1000 ML: 9 INJECTION, SOLUTION INTRAVENOUS at 13:31

## 2022-08-19 RX ADMIN — KETOROLAC TROMETHAMINE 15 MG: 15 INJECTION, SOLUTION INTRAMUSCULAR; INTRAVENOUS at 16:30

## 2022-08-19 NOTE — ED TRIAGE NOTES
Left flank pain x few days, reports last menstrual cycle pt had kidney infection, pt  currently on cycle at this time

## 2022-08-19 NOTE — DISCHARGE INSTRUCTIONS
Thank you! Thank you for allowing me to care for you in the emergency department. It is my goal to provide you with excellent care. If you have not received excellent quality care, please ask to speak to the nurse manager. Please fill out the survey that will come to you by mail or email since we listen to your feedback! Below you will find a list of your tests from today's visit. Should you have any questions, please do not hesitate to call the emergency department. Labs  Recent Results (from the past 12 hour(s))   CBC WITH AUTOMATED DIFF    Collection Time: 08/19/22  1:30 PM   Result Value Ref Range    WBC 8.0 3.6 - 11.0 K/uL    RBC 3.84 3.80 - 5.20 M/uL    HGB 13.1 11.5 - 16.0 g/dL    HCT 39.0 35.0 - 47.0 %    .6 (H) 80.0 - 99.0 FL    MCH 34.1 (H) 26.0 - 34.0 PG    MCHC 33.6 30.0 - 36.5 g/dL    RDW 12.9 11.5 - 14.5 %    PLATELET 862 166 - 943 K/uL    MPV 9.7 8.9 - 12.9 FL    NEUTROPHILS 79 (H) 32 - 75 %    LYMPHOCYTES 17 12 - 49 %    MONOCYTES 4 (L) 5 - 13 %    EOSINOPHILS 0 0 - 7 %    BASOPHILS 0 0 - 1 %    IMMATURE GRANULOCYTES 0 0.0 - 0.5 %    ABS. NEUTROPHILS 6.3 1.8 - 8.0 K/UL    ABS. LYMPHOCYTES 1.4 0.8 - 3.5 K/UL    ABS. MONOCYTES 0.3 0.0 - 1.0 K/UL    ABS. EOSINOPHILS 0.0 0.0 - 0.4 K/UL    ABS. BASOPHILS 0.0 0.0 - 0.1 K/UL    ABS. IMM. GRANS. 0.0 0.00 - 0.04 K/UL    DF AUTOMATED     METABOLIC PANEL, COMPREHENSIVE    Collection Time: 08/19/22  1:30 PM   Result Value Ref Range    Sodium 138 136 - 145 mmol/L    Potassium 4.1 3.5 - 5.1 mmol/L    Chloride 102 97 - 108 mmol/L    CO2 24 21 - 32 mmol/L    Anion gap 12 5 - 15 mmol/L    Glucose 107 (H) 65 - 100 mg/dL    BUN 14 6 - 20 mg/dL    Creatinine 0.87 0.55 - 1.02 mg/dL    BUN/Creatinine ratio 16 12 - 20      GFR est AA >60 >60 ml/min/1.73m2    GFR est non-AA >60 >60 ml/min/1.73m2    Calcium 9.7 8.5 - 10.1 mg/dL    Bilirubin, total 0.8 0.2 - 1.0 mg/dL    AST (SGOT) 27 15 - 37 U/L    ALT (SGPT) 26 12 - 78 U/L    Alk.  phosphatase 67 45 - 117 U/L    Protein, total 8.5 (H) 6.4 - 8.2 g/dL    Albumin 4.5 3.5 - 5.0 g/dL    Globulin 4.0 2.0 - 4.0 g/dL    A-G Ratio 1.1 1.1 - 2.2     URINALYSIS W/ REFLEX CULTURE    Collection Time: 08/19/22  1:47 PM    Specimen: Urine   Result Value Ref Range    Color Yellow      Appearance Clear Clear      Specific gravity 1.020 1.003 - 1.030      pH (UA) 5.0 5.0 - 8.0      Protein 30 (A) Negative mg/dL    Glucose Negative Negative mg/dL    Ketone 50 (A) Negative mg/dL    Bilirubin Small (A) Negative      Blood Large (A) Negative      Urobilinogen 1.0 0.2 - 1.0 EU/dL    Nitrites Negative Negative      Leukocyte Esterase Trace (A) Negative      WBC 0-4 0 - 4 /hpf    RBC 0-5 0 - 5 /hpf    Epithelial cells Moderate (A) Few /lpf    Bacteria 1+ (A) Negative /hpf    UA:UC IF INDICATED Culture not indicated by UA result Culture not indicated by UA result      Mucus 2+ (A) Negative /lpf   HCG URINE, QL    Collection Time: 08/19/22  1:47 PM   Result Value Ref Range    HCG urine, QL Negative Negative         Radiologic Studies  CT ABD PELV WO CONT   Final Result   No acute intraperitoneal process is identified. Incidental and/or nonemergent findings are as described in detail above. CT Results  (Last 48 hours)                 08/19/22 1611  CT ABD PELV WO CONT Final result    Impression:  No acute intraperitoneal process is identified. Incidental and/or nonemergent findings are as described in detail above. Narrative:  CLINICAL HISTORY: flank pain    INDICATION: flank pain   COMPARISON:   CONTRAST:  None. TECHNIQUE:    Thin axial images were obtained through the abdomen and pelvis. Coronal and   sagittal reformats were generated. Oral contrast was not administered. CT dose   reduction was achieved through use of a standardized protocol tailored for this   examination and automatic exposure control for dose modulation.         The absence of intravenous contrast material reduces the sensitivity for   evaluation of visceral organs and vasculature including presence of small mass   lesions, hemodynamically significant stenoses, dissections, mucosal   abnormalities etc.       FINDINGS:    LOWER THORAX: No significant abnormality in the incidentally imaged lower chest.   LIVER/GALLBLADDER: No mass. Gallbladder is within normal limits. CBD is not   dilated. SPLEEN/PANCREAS:  within normal limits. ADRENALS/KIDNEYS: Unremarkable. No calculus or hydronephrosis. STOMACH: Unremarkable. SMALL BOWEL/COLON: No dilatation or wall thickening. There is fecal stasis. APPENDIX: Unremarkable. PERITONEUM: Free pelvic fluid is likely physiologic in nature. RETROPERITONEUM: No lymphadenopathy or aortic aneurysm. REPRODUCTIVE ORGANS:   URINARY BLADDER: No mass or calculus. BONES: No destructive bone lesion. ADDITIONAL COMMENTS: N/A                 CXR Results  (Last 48 hours)      None          ------------------------------------------------------------------------------------------------------------  The exam and treatment you received in the Emergency Department were for an urgent problem and are not intended as complete care. It is important that you follow-up with a doctor, nurse practitioner, or physician assistant to:  (1) confirm your diagnosis,  (2) re-evaluation of changes in your illness and treatment, and  (3) for ongoing care. Please take your discharge instructions with you when you go to your follow-up appointment. If you have any problem arranging a follow-up appointment, contact the Emergency Department. If your symptoms become worse or you do not improve as expected and you are unable to reach your health care provider, please return to the Emergency Department. We are available 24 hours a day. If a prescription has been provided, please have it filled as soon as possible to prevent a delay in treatment.  If you have any questions or reservations about taking the medication due to side effects or interactions with other medications, please call your primary care provider or contact the ER.

## 2022-08-19 NOTE — Clinical Note
[address] EMERGENCY DEPARTMENT  [address] [facility] [address]  [phone]    Work/School Note    Date: 8/19/2022    To Whom It May concern:      Galindo Brower was seen and treated today in the emergency room by the following provider(s):  Attending Provider: Belkis Bowles MD.      Galindo Brower is excused from work/school on 08/19/22. She is clear to return to work/school on 08/20/22.         Sincerely,          Silvia Mansfield RN

## 2022-08-19 NOTE — Clinical Note
[address] EMERGENCY DEPARTMENT  [address] [facility] [address]  [phone]    Work/School Note    Date: 8/19/2022    To Whom It May concern:      Ana Fernandez was seen and treated today in the emergency room by the following provider(s):  Attending Provider: Cinda Ackerman MD.      Ana Fernandez is excused from work/school on 08/19/22. She is clear to return to work/school on 08/20/22.         Sincerely,          Omar Marroquin MD

## 2022-08-19 NOTE — ED PROVIDER NOTES
EMERGENCY DEPARTMENT HISTORY AND PHYSICAL EXAM      Date: 8/19/2022  Patient Name: Higinio Grigsby    History of Presenting Illness     Chief Complaint   Patient presents with    Flank Pain       History Provided By: Patient    HPI: Higinio Grigsby, 25 y.o. female with HIV significant past medical history  presents to the ED with left flank and back pain. Pt had kidney infection last month during menses. Feels similar. She denies fever, chills, NVD, hematuria. She denies pregnancy or vaginal discharge, dysuria, hematuria, frequency. There are no other complaints, changes, or physical findings at this time. PCP: Leonarda Kelly NP    No current facility-administered medications on file prior to encounter. Current Outpatient Medications on File Prior to Encounter   Medication Sig Dispense Refill    Biktarvy tab tablet Take 1 Tablet by mouth daily. ibuprofen (MOTRIN) 800 mg tablet Take 1 Tablet by mouth every six (6) hours as needed for Pain. 25 Tablet 0       Past History     Past Medical History:  Past Medical History:   Diagnosis Date    HIV (human immunodeficiency virus infection) ([facility] [address]        Past Surgical History:  Reviewed; No pertinent surgical history. Family History:  Reviewed: no pertinent family history    Social History:  Social History     Tobacco Use    Smoking status: Never    Smokeless tobacco: Never   Substance Use Topics    Alcohol use: Never    Drug use: Yes     Types: Marijuana     Comment: daily       Allergies:  No Known Allergies    Review of Systems   Review of Systems   Constitutional: Negative. Negative for fever. HENT: Negative. Respiratory: Negative. Genitourinary:  Positive for flank pain. Negative for dysuria, hematuria and urgency. Musculoskeletal:  Positive for back pain. Skin: Negative. Neurological: Negative. All other systems reviewed and are negative. Physical Exam   Physical Exam  Vitals and nursing note reviewed.    Constitutional: General: She is not in acute distress. HENT:      Head: Atraumatic. Eyes:      Conjunctiva/sclera: Conjunctivae normal.      Pupils: Pupils are equal, round, and reactive to light. Cardiovascular:      Rate and Rhythm: Normal rate and regular rhythm. Heart sounds: Normal heart sounds. No murmur heard. Pulmonary:      Effort: Pulmonary effort is normal. No respiratory distress. Breath sounds: Normal breath sounds. No wheezing or rales. Chest:      Chest wall: No tenderness. Abdominal:      General: Bowel sounds are normal. There is no distension. Palpations: Abdomen is soft. There is no mass. Tenderness: There is no abdominal tenderness. There is no right CVA tenderness, left CVA tenderness, guarding or rebound. Musculoskeletal:         General: Normal range of motion. Cervical back: Normal range of motion. Skin:     General: Skin is warm. Findings: No erythema or rash. Neurological:      Mental Status: She is alert and oriented to person, place, and time. Cranial Nerves: No cranial nerve deficit. Lab and Diagnostic Study Results   Labs -   No results found for this or any previous visit (from the past 12 hour(s)).   New Results - Labs    Updated   Order    08/19/22 1456  HCG URINE, QL   Collected: 08/19/22 1347  Final result  Specimen: Urine, random     HCG urine, QL Negative            08/19/22 1436  URINALYSIS W/ REFLEX CULTURE   Collected: 08/19/22 1347  Final result  Specimen: Urine     Color Yellow      Appearance Clear     Specific gravity 1.020     pH (UA) 5.0     Protein 30 Abnormal  mg/dL   Glucose Negative mg/dL   Ketone 50 Abnormal  mg/dL   Bilirubin Small Abnormal      Blood Large Abnormal      Urobilinogen 1.0 EU/dL   Nitrites Negative     Leukocyte Esterase Trace Abnormal      WBC 0-4 /hpf   RBC 0-5 /hpf   Epithelial cells Moderate Abnormal  /lpf    Bacteria 1+ Abnormal  /hpf   UA:UC IF INDICATED Culture not indicated by UA result     Mucus 2+ Abnormal  /lpf          08/19/22 1428  CBC WITH AUTOMATED DIFF   Collected: 08/19/22 1330  Final result  Specimen: Whole Blood     WBC 8.0 K/uL   RBC 3.84 M/uL   HGB 13.1 g/dL   HCT 39.0 %   .6 High  FL   MCH 34.1 High  PG   MCHC 33.6 g/dL   RDW 12.9 %   PLATELET 162 K/uL   MPV 9.7 FL   NEUTROPHILS 79 High  %   LYMPHOCYTES 17 %   MONOCYTES 4 Low  %   EOSINOPHILS 0 %   BASOPHILS 0 %   IMMATURE GRANULOCYTES 0 %   ABS. NEUTROPHILS 6.3 K/UL   ABS. LYMPHOCYTES 1.4 K/UL   ABS. MONOCYTES 0.3 K/UL   ABS. EOSINOPHILS 0.0 K/UL   ABS. BASOPHILS 0.0 K/UL   ABS. IMM. GRANS. 0.0 K/UL   DF AUTOMATED            78/73/11 7368  METABOLIC PANEL, COMPREHENSIVE   Collected: 08/19/22 1330  Final result  Specimen: Serum or Plasma     Sodium 138 mmol/L   Potassium 4.1 mmol/L    Chloride 102 mmol/L   CO2 24 mmol/L   Anion gap 12 mmol/L   Glucose 107 High  mg/dL   BUN 14 mg/dL   Creatinine 0.87 mg/dL   BUN/Creatinine ratio 16     GFR est AA >60 ml/min/1.73m2   GFR est non-AA >60 ml/min/1.73m2    Calcium 9.7 mg/dL   Bilirubin, total 0.8 mg/dL   AST (SGOT) 27 U/L    ALT (SGPT) 26 U/L   Alk. phosphatase 67 U/L   Protein, total 8.5 High  g/dL   Albumin 4.5 g/dL   Globulin 4.0 g/dL   A-G Ratio 1.1              Radiologic Studies -   @lastxrresult@  CT Results  (Last 48 hours)      None          CXR Results  (Last 48 hours)      None        New Results - Imaging    Updated   Order    08/19/22 1620  CT ABD PELV WO CONT   Performed: 08/19/22 1611  Final         Impression: No acute intraperitoneal process is identified. Incidental and/or nonemergent findings are as described in detail above          Medical Decision Making and ED Course   Differential Diagnosis & Medical Decision Making Provider Note:   UTI, pyelo, Ectopic, pregnancy, ovarian cyst, viral syndrome    - I am the first provider for this patient.   I reviewed the vital signs, available nursing notes, past medical history, past surgical history, family history and social history. The patients presenting problems have been discussed, and they are in agreement with the care plan formulated and outlined with them. I have encouraged them to ask questions as they arise throughout their visit. Vital Signs-Reviewed the patient's vital signs. Patient Vitals for the past 12 hrs:   Temp Pulse Resp BP SpO2   08/19/22 1322 98.4 °F (36.9 °C) (!) 52 15 (!) 144/88 100 %       ED Course:      Progress: Pt's pain improved after treatment in ED. Procedures   Performed by: Sandra Boss MD  Procedures      Disposition   Disposition: Condition improved  DC- Adult Discharges: All of the diagnostic tests were reviewed and questions answered. Diagnosis, care plan and treatment options were discussed. The patient understands the instructions and will follow up as directed. The patients results have been reviewed with them. They have been counseled regarding their diagnosis. The patient verbally convey understanding and agreement of the signs, symptoms, diagnosis, treatment and prognosis and additionally agrees to follow up as recommended with their PCP in 24 - 48 hours. They also agree with the care-plan and convey that all of their questions have been answered. I have also put together some discharge instructions for them that include: 1) educational information regarding their diagnosis, 2) how to care for their diagnosis at home, as well a 3) list of reasons why they would want to return to the ED prior to their follow-up appointment, should their condition change. DISCHARGE PLAN:  1. Current Discharge Medication List        CONTINUE these medications which have NOT CHANGED    Details   Biktarvy tab tablet Take 1 Tablet by mouth daily. ibuprofen (MOTRIN) 800 mg tablet Take 1 Tablet by mouth every six (6) hours as needed for Pain. Qty: 25 Tablet, Refills: 0           2.    Follow-up Information       Follow up With Specialties Details Why Contact Info    Roberto Cherry NP Nurse Practitioner In 3 days            3. Return to ED if worse   4. Current Discharge Medication List          Diagnosis/Clinical Impression     Clinical Impression: No diagnosis found. Attestations: Terri Aj MD, am the primary clinician of record. Please note that this dictation was completed with [employer], the computer voice recognition software. Quite often unanticipated grammatical, syntax, homophones, and other interpretive errors are inadvertently transcribed by the computer software. Please disregard these errors. Please excuse any errors that have escaped final proofreading. Thank you.

## 2022-09-17 ENCOUNTER — HOSPITAL ENCOUNTER (EMERGENCY)
Age: 22
Discharge: HOME | End: 2022-09-17
Attending: EMERGENCY MEDICINE
Payer: MEDICAID

## 2022-09-17 VITALS
OXYGEN SATURATION: 100 % | DIASTOLIC BLOOD PRESSURE: 80 MMHG | SYSTOLIC BLOOD PRESSURE: 134 MMHG | RESPIRATION RATE: 16 BRPM | HEART RATE: 51 BPM | TEMPERATURE: 98.4 F | BODY MASS INDEX: 18.36 KG/M2 | WEIGHT: 117 LBS | HEIGHT: 67 IN

## 2022-09-17 DIAGNOSIS — R52 GENERALIZED BODY ACHES: ICD-10-CM

## 2022-09-17 DIAGNOSIS — N94.6 DYSMENORRHEA: Primary | ICD-10-CM

## 2022-09-17 LAB
FLUAV AG NPH QL IA: NEGATIVE
FLUBV AG NOSE QL IA: NEGATIVE

## 2022-09-17 PROCEDURE — 87804 INFLUENZA ASSAY W/OPTIC: CPT

## 2022-09-17 PROCEDURE — 96372 THER/PROPH/DIAG INJ SC/IM: CPT

## 2022-09-17 PROCEDURE — 74011250637 HC RX REV CODE- 250/637: Performed by: EMERGENCY MEDICINE

## 2022-09-17 PROCEDURE — 99284 EMERGENCY DEPT VISIT MOD MDM: CPT

## 2022-09-17 PROCEDURE — 74011250636 HC RX REV CODE- 250/636: Performed by: EMERGENCY MEDICINE

## 2022-09-17 RX ORDER — KETOROLAC TROMETHAMINE 30 MG/ML
30 INJECTION, SOLUTION INTRAMUSCULAR; INTRAVENOUS ONCE
Status: COMPLETED | OUTPATIENT
Start: 2022-09-17 | End: 2022-09-17

## 2022-09-17 RX ORDER — ACETAMINOPHEN 500 MG
1000 TABLET ORAL ONCE
Status: COMPLETED | OUTPATIENT
Start: 2022-09-17 | End: 2022-09-17

## 2022-09-17 RX ORDER — MAG HYDROX/ALUMINUM HYD/SIMETH 200-200-20
30 SUSPENSION, ORAL (FINAL DOSE FORM) ORAL
Status: COMPLETED | OUTPATIENT
Start: 2022-09-17 | End: 2022-09-17

## 2022-09-17 RX ORDER — ONDANSETRON 4 MG/1
4 TABLET, FILM COATED ORAL
Qty: 12 TABLET | Refills: 0 | Status: SHIPPED | OUTPATIENT
Start: 2022-09-17 | End: 2022-09-21

## 2022-09-17 RX ORDER — IBUPROFEN 800 MG/1
800 TABLET ORAL ONCE
Status: COMPLETED | OUTPATIENT
Start: 2022-09-17 | End: 2022-09-17

## 2022-09-17 RX ORDER — ONDANSETRON 4 MG/1
4 TABLET, ORALLY DISINTEGRATING ORAL
Status: COMPLETED | OUTPATIENT
Start: 2022-09-17 | End: 2022-09-17

## 2022-09-17 RX ORDER — IBUPROFEN 800 MG/1
800 TABLET ORAL
Qty: 20 TABLET | Refills: 0 | Status: SHIPPED | OUTPATIENT
Start: 2022-09-17 | End: 2022-09-24

## 2022-09-17 RX ADMIN — ALUMINUM HYDROXIDE, MAGNESIUM HYDROXIDE, AND SIMETHICONE 30 ML: 200; 200; 20 SUSPENSION ORAL at 19:27

## 2022-09-17 RX ADMIN — ONDANSETRON 4 MG: 4 TABLET, ORALLY DISINTEGRATING ORAL at 19:20

## 2022-09-17 RX ADMIN — ACETAMINOPHEN 1000 MG: 500 TABLET ORAL at 19:20

## 2022-09-17 RX ADMIN — IBUPROFEN 800 MG: 800 TABLET, FILM COATED ORAL at 19:20

## 2022-09-17 RX ADMIN — KETOROLAC TROMETHAMINE 30 MG: 30 INJECTION, SOLUTION INTRAMUSCULAR; INTRAVENOUS at 19:36

## 2022-09-17 NOTE — ED PROVIDER NOTES
EMERGENCY DEPARTMENT HISTORY AND PHYSICAL EXAM      Date: 9/17/2022  Patient Name: Jovani Davis    History of Presenting Illness     Chief Complaint   Patient presents with    Fever    Chills       History Provided By: Patient    HPI: Jovani Davis, 25 y.o. female   presents to the ED with cc of body ache. Patient complains of onset of generalized body ache along with lower abdominal discomfort this morning. Patient also complains of intermittent episode nausea and vomiting. Patient states that she usually have this body ache and abdominal cramping along with nausea when her periods begins. She started to have vaginal bleeding this morning. Patient also complains of subjective fever and chill. No URI symptoms. No nasal congestion, sore throat, or cough. PCP: Chris Valdes NP    No current facility-administered medications on file prior to encounter. Current Outpatient Medications on File Prior to Encounter   Medication Sig Dispense Refill    Biktarvy tab tablet Take 1 Tablet by mouth daily. ibuprofen (MOTRIN) 800 mg tablet Take 1 Tablet by mouth every six (6) hours as needed for Pain. 25 Tablet 0       Past History     Past Medical History:  Past Medical History:   Diagnosis Date    HIV (human immunodeficiency virus infection) ([facility] [address]        Past Surgical History:  History reviewed. No pertinent surgical history. Family History:  History reviewed. No pertinent family history. Social History:  Social History     Tobacco Use    Smoking status: Never    Smokeless tobacco: Never   Substance Use Topics    Alcohol use: Never    Drug use: Yes     Types: Marijuana     Comment: daily       Allergies:  No Known Allergies      Review of Systems   Review of Systems   Constitutional:  Positive for chills and fever. HENT:  Negative for rhinorrhea and sore throat. Eyes:  Negative for discharge. Respiratory:  Negative for shortness of breath. Cardiovascular:  Negative for chest pain. Gastrointestinal:  Positive for abdominal pain and vomiting. Genitourinary:  Positive for vaginal bleeding. Negative for dysuria. Musculoskeletal:  Negative for joint swelling. Skin:  Negative for rash. Neurological:  Negative for headaches. Psychiatric/Behavioral:  Negative for suicidal ideas. All other systems reviewed and are negative. Physical Exam   Physical Exam  Vitals and nursing note reviewed. Constitutional:       General: She is not in acute distress. Appearance: Normal appearance. She is well-developed. She is not ill-appearing or toxic-appearing. HENT:      Head: Normocephalic and atraumatic. Nose: No congestion. Mouth/Throat:      Mouth: Mucous membranes are moist.   Eyes:      Conjunctiva/sclera: Conjunctivae normal.   Cardiovascular:      Rate and Rhythm: Regular rhythm. Heart sounds: Normal heart sounds. Pulmonary:      Effort: Pulmonary effort is normal.      Breath sounds: Normal breath sounds. Abdominal:      General: Bowel sounds are normal.      Palpations: Abdomen is soft. Tenderness: There is no abdominal tenderness. There is no right CVA tenderness or left CVA tenderness. Musculoskeletal:         General: No deformity. Cervical back: Neck supple. Skin:     General: Skin is warm and dry. Neurological:      General: No focal deficit present. Mental Status: She is alert. Psychiatric:         Mood and Affect: Mood normal.       Diagnostic Study Results     Labs -     Recent Results (from the past 12 hour(s))   INFLUENZA A & B AG (RAPID TEST)    Collection Time: 09/17/22  6:45 PM   Result Value Ref Range    Influenza A Antigen Negative Negative      Influenza B Antigen Negative Negative         Radiologic Studies -   No orders to display     CT Results  (Last 48 hours)      None          CXR Results  (Last 48 hours)      None              Medical Decision Making   I am the first provider for this patient.     I reviewed the vital signs, available nursing notes, past medical history, past surgical history, family history and social history. Vital Signs-Reviewed the patient's vital signs. Patient Vitals for the past 12 hrs:   Temp Pulse Resp BP SpO2   09/17/22 1830 98.4 °F (36.9 °C) (!) 51 16 134/80 100 %       Records Reviewed:     Provider Notes (Medical Decision Making):       ED Course:   Initial assessment performed. The patients presenting problems have been discussed, and they are in agreement with the care plan formulated and outlined with them. I have encouraged them to ask questions as they arise throughout their visit. PROCEDURES      Disposition: Condition stable   DC- Adult Discharges: All of the diagnostic tests were reviewed and questions answered. Diagnosis, care plan and treatment options were discussed. understand instructions and will follow up as directed. The patients results have been reviewed with them. They have been counseled regarding their diagnosis. The patient verbally convey understanding and agreement of the signs, symptoms, diagnosis, treatment and prognosis and additionally agrees to follow up as recommended. They also agree with the care-plan and convey that all of their questions have been answered. I have also put together some discharge instructions for them that include: 1) educational information regarding their diagnosis, 2) how to care for their diagnosis at home, as well a 3) list of reasons why they would want to return to the ED prior to their follow-up appointment, should their condition change. PLAN:  1. Discharge Medication List as of 9/17/2022  7:19 PM        START taking these medications    Details   !! ibuprofen (MOTRIN) 800 mg tablet Take 1 Tablet by mouth every eight (8) hours as needed for Pain for up to 7 days. , Normal, Disp-20 Tablet, R-0      ondansetron hcl (Zofran) 4 mg tablet Take 1 Tablet by mouth every eight (8) hours as needed for Nausea or Vomiting for up to 4 days. , Normal, Disp-12 Tablet, R-0       !! - Potential duplicate medications found. Please discuss with provider. CONTINUE these medications which have NOT CHANGED    Details   Biktarvy tab tablet Take 1 Tablet by mouth daily. , Historical Med, HERMES      !! ibuprofen (MOTRIN) 800 mg tablet Take 1 Tablet by mouth every six (6) hours as needed for Pain., Normal, Disp-25 Tablet, R-0       !! - Potential duplicate medications found. Please discuss with provider. 2.   Follow-up Information       Follow up With Specialties Details Why Contact Info    Follow up with your primary care physician  Schedule an appointment as soon as possible for a visit in 3 days As needed           Return to ED if worse     Diagnosis     Clinical Impression:   1. Dysmenorrhea    2. Generalized body aches        Please note that this dictation was completed with [employer] ([employer] the computer voice recognition software. Quite often unanticipated grammatical, syntax, homophones, and other interpretive errors are inadvertently transcribed by the computer software. Please disregard these errors. Please excuse any errors that have escaped final proofreading. Thank you.

## 2022-09-17 NOTE — ED TRIAGE NOTES
PT. TO ED WITH C/O CHILLS AND FEVER THAT STARTED THIS MORNING WITH GENERALIZED BODY ACHES. PT. STATES, CHEST HURTS WITH DEEP BREATHING.

## 2022-09-18 ENCOUNTER — HOSPITAL ENCOUNTER (EMERGENCY)
Age: 22
Discharge: HOME | End: 2022-09-18
Attending: STUDENT IN AN ORGANIZED HEALTH CARE EDUCATION/TRAINING PROGRAM
Payer: MEDICAID

## 2022-09-18 ENCOUNTER — APPOINTMENT (OUTPATIENT)
Dept: GENERAL RADIOLOGY | Age: 22
End: 2022-09-18
Attending: STUDENT IN AN ORGANIZED HEALTH CARE EDUCATION/TRAINING PROGRAM
Payer: MEDICAID

## 2022-09-18 VITALS
WEIGHT: 117 LBS | BODY MASS INDEX: 18.36 KG/M2 | DIASTOLIC BLOOD PRESSURE: 62 MMHG | OXYGEN SATURATION: 100 % | HEIGHT: 67 IN | SYSTOLIC BLOOD PRESSURE: 153 MMHG | RESPIRATION RATE: 16 BRPM | HEART RATE: 64 BPM | TEMPERATURE: 99.4 F

## 2022-09-18 DIAGNOSIS — R11.2 NAUSEA AND VOMITING, UNSPECIFIED VOMITING TYPE: Primary | ICD-10-CM

## 2022-09-18 LAB
ALBUMIN SERPL-MCNC: 4.1 G/DL (ref 3.5–5)
ALBUMIN/GLOB SERPL: 1 {RATIO} (ref 1.1–2.2)
ALP SERPL-CCNC: 52 U/L (ref 45–117)
ALT SERPL-CCNC: 19 U/L (ref 12–78)
ANION GAP SERPL CALC-SCNC: 10 MMOL/L (ref 5–15)
APPEARANCE UR: ABNORMAL
AST SERPL W P-5'-P-CCNC: 16 U/L (ref 15–37)
BACTERIA URNS QL MICRO: NEGATIVE /HPF
BACTERIA URNS QL MICRO: NEGATIVE /HPF
BASOPHILS # BLD: 0 K/UL (ref 0–0.1)
BASOPHILS NFR BLD: 0 % (ref 0–1)
BILIRUB SERPL-MCNC: 0.5 MG/DL (ref 0.2–1)
BILIRUB UR QL: NEGATIVE
BUN SERPL-MCNC: 14 MG/DL (ref 6–20)
BUN/CREAT SERPL: 13 (ref 12–20)
CA-I BLD-MCNC: 9.4 MG/DL (ref 8.5–10.1)
CHLORIDE SERPL-SCNC: 105 MMOL/L (ref 97–108)
CO2 SERPL-SCNC: 22 MMOL/L (ref 21–32)
COLOR UR: ABNORMAL
CREAT SERPL-MCNC: 1.09 MG/DL (ref 0.55–1.02)
DIFFERENTIAL METHOD BLD: ABNORMAL
EOSINOPHIL # BLD: 0 K/UL (ref 0–0.4)
EOSINOPHIL NFR BLD: 0 % (ref 0–7)
ERYTHROCYTE [DISTWIDTH] IN BLOOD BY AUTOMATED COUNT: 12.3 % (ref 11.5–14.5)
GLOBULIN SER CALC-MCNC: 4.1 G/DL (ref 2–4)
GLUCOSE SERPL-MCNC: 89 MG/DL (ref 65–100)
GLUCOSE UR STRIP.AUTO-MCNC: NEGATIVE MG/DL
HCG UR QL: NEGATIVE
HCT VFR BLD AUTO: 37.1 % (ref 35–47)
HGB BLD-MCNC: 12.7 G/DL (ref 11.5–16)
HGB UR QL STRIP: NEGATIVE
IMM GRANULOCYTES # BLD AUTO: 0 K/UL (ref 0–0.04)
IMM GRANULOCYTES NFR BLD AUTO: 0 % (ref 0–0.5)
KETONES UR QL STRIP.AUTO: 40 MG/DL
LEUKOCYTE ESTERASE UR QL STRIP.AUTO: NEGATIVE
LYMPHOCYTES # BLD: 1.4 K/UL (ref 0.8–3.5)
LYMPHOCYTES NFR BLD: 14 % (ref 12–49)
MCH RBC QN AUTO: 34.1 PG (ref 26–34)
MCHC RBC AUTO-ENTMCNC: 34.2 G/DL (ref 30–36.5)
MCV RBC AUTO: 99.7 FL (ref 80–99)
MONOCYTES # BLD: 0.4 K/UL (ref 0–1)
MONOCYTES NFR BLD: 4 % (ref 5–13)
MUCOUS THREADS URNS QL MICRO: ABNORMAL /LPF
MUCOUS THREADS URNS QL MICRO: ABNORMAL /LPF
NEUTS SEG # BLD: 8 K/UL (ref 1.8–8)
NEUTS SEG NFR BLD: 82 % (ref 32–75)
NITRITE UR QL STRIP.AUTO: NEGATIVE
NRBC # BLD: 0 K/UL (ref 0–0.01)
NRBC BLD-RTO: 0 PER 100 WBC
PH UR STRIP: 6 [PH]
PLATELET # BLD AUTO: 278 K/UL (ref 150–400)
PMV BLD AUTO: 10.6 FL (ref 8.9–12.9)
POTASSIUM SERPL-SCNC: 3.5 MMOL/L (ref 3.5–5.1)
PROT SERPL-MCNC: 8.2 G/DL (ref 6.4–8.2)
PROT UR STRIP-MCNC: ABNORMAL MG/DL
RBC # BLD AUTO: 3.72 M/UL (ref 3.8–5.2)
RBC #/AREA URNS HPF: ABNORMAL /HPF (ref 0–5)
RBC #/AREA URNS HPF: ABNORMAL /HPF (ref 0–5)
SODIUM SERPL-SCNC: 137 MMOL/L (ref 136–145)
SP GR UR REFRACTOMETRY: 1.01 (ref 1–1.03)
UROBILINOGEN UR QL STRIP.AUTO: 0.1 EU/DL (ref 0.2–1)
WBC # BLD AUTO: 9.9 K/UL (ref 3.6–11)
WBC URNS QL MICRO: ABNORMAL /HPF (ref 0–4)
WBC URNS QL MICRO: ABNORMAL /HPF (ref 0–4)

## 2022-09-18 PROCEDURE — 71045 X-RAY EXAM CHEST 1 VIEW: CPT

## 2022-09-18 PROCEDURE — 80053 COMPREHEN METABOLIC PANEL: CPT

## 2022-09-18 PROCEDURE — 74011250637 HC RX REV CODE- 250/637: Performed by: STUDENT IN AN ORGANIZED HEALTH CARE EDUCATION/TRAINING PROGRAM

## 2022-09-18 PROCEDURE — 96374 THER/PROPH/DIAG INJ IV PUSH: CPT

## 2022-09-18 PROCEDURE — 74011250636 HC RX REV CODE- 250/636: Performed by: STUDENT IN AN ORGANIZED HEALTH CARE EDUCATION/TRAINING PROGRAM

## 2022-09-18 PROCEDURE — 93005 ELECTROCARDIOGRAM TRACING: CPT

## 2022-09-18 PROCEDURE — 74011000250 HC RX REV CODE- 250: Performed by: STUDENT IN AN ORGANIZED HEALTH CARE EDUCATION/TRAINING PROGRAM

## 2022-09-18 PROCEDURE — 85025 COMPLETE CBC W/AUTO DIFF WBC: CPT

## 2022-09-18 PROCEDURE — 96375 TX/PRO/DX INJ NEW DRUG ADDON: CPT

## 2022-09-18 PROCEDURE — 99285 EMERGENCY DEPT VISIT HI MDM: CPT

## 2022-09-18 PROCEDURE — 81001 URINALYSIS AUTO W/SCOPE: CPT

## 2022-09-18 PROCEDURE — 81025 URINE PREGNANCY TEST: CPT

## 2022-09-18 PROCEDURE — 36415 COLL VENOUS BLD VENIPUNCTURE: CPT

## 2022-09-18 RX ORDER — ACETAMINOPHEN 325 MG/1
650 TABLET ORAL
Status: COMPLETED | OUTPATIENT
Start: 2022-09-18 | End: 2022-09-18

## 2022-09-18 RX ORDER — KETOROLAC TROMETHAMINE 30 MG/ML
30 INJECTION, SOLUTION INTRAMUSCULAR; INTRAVENOUS
Status: COMPLETED | OUTPATIENT
Start: 2022-09-18 | End: 2022-09-18

## 2022-09-18 RX ORDER — ONDANSETRON 2 MG/ML
4 INJECTION INTRAMUSCULAR; INTRAVENOUS
Status: COMPLETED | OUTPATIENT
Start: 2022-09-18 | End: 2022-09-18

## 2022-09-18 RX ADMIN — ONDANSETRON 4 MG: 2 INJECTION INTRAMUSCULAR; INTRAVENOUS at 13:49

## 2022-09-18 RX ADMIN — ACETAMINOPHEN 650 MG: 325 TABLET, FILM COATED ORAL at 15:52

## 2022-09-18 RX ADMIN — LIDOCAINE HYDROCHLORIDE 30 ML: 20 SOLUTION TOPICAL at 14:24

## 2022-09-18 RX ADMIN — SODIUM CHLORIDE 1000 ML: 9 INJECTION, SOLUTION INTRAVENOUS at 13:50

## 2022-09-18 RX ADMIN — KETOROLAC TROMETHAMINE 30 MG: 30 INJECTION, SOLUTION INTRAMUSCULAR; INTRAVENOUS at 13:50

## 2022-09-18 NOTE — ED TRIAGE NOTES
Chest pain, feels like something is sitting on her chest. Pt reports multiple episodes of vomiting. Unable to tolerate PO.

## 2022-09-18 NOTE — ED PROVIDER NOTES
EMERGENCY DEPARTMENT HISTORY AND PHYSICAL EXAM      Date: 9/18/2022  Patient Name: Prashanth Galvez    History of Presenting Illness     Chief Complaint   Patient presents with    Chest Pain    Vomiting       History Provided By: Patient    HPI: Prashanth Galvez, 25 y.o. female with past medical history of HIV presents for evaluation of nausea and vomiting. Patient was seen in this emergency department yesterday for similar symptoms in the setting of beginning her menstrual cycle. These are typical symptoms for her during her periods. She was prescribed Zofran but was unable to fill this prescription due to the pharmacy being closed. She has continued to have vomiting overnight and now reports significant chest pain in the epigastric area following vomiting. She is not been able to tolerate p.o. denies any fevers, no dysuria or diarrhea. There are no other complaints, changes, or physical findings at this time. PCP: Abby Shukla NP    Current Facility-Administered Medications on File Prior to Encounter   Medication Dose Route Frequency Provider Last Rate Last Admin    [COMPLETED] acetaminophen (TYLENOL) tablet 1,000 mg  1,000 mg Oral ONCE Cary Espinoza MD   1,000 mg at 09/17/22 1920    [COMPLETED] ibuprofen (MOTRIN) tablet 800 mg  800 mg Oral ONCE Cary Espinoza MD   800 mg at 09/17/22 1920    [COMPLETED] ondansetron (ZOFRAN ODT) tablet 4 mg  4 mg Oral NOW Meaghan Blake MD   4 mg at 09/17/22 1920    [COMPLETED] alum-mag hydroxide-simeth (MYLANTA) oral suspension 30 mL  30 mL Oral NOW Meaghan Blake MD   30 mL at 09/17/22 1927    [COMPLETED] ketorolac (TORADOL) injection 30 mg  30 mg IntraMUSCular ONCE Meaghan Blake MD   30 mg at 09/17/22 1936     Current Outpatient Medications on File Prior to Encounter   Medication Sig Dispense Refill    ibuprofen (MOTRIN) 800 mg tablet Take 1 Tablet by mouth every eight (8) hours as needed for Pain for up to 7 days.  20 Tablet 0    ondansetron hcl (Zofran) 4 mg tablet Take 1 Tablet by mouth every eight (8) hours as needed for Nausea or Vomiting for up to 4 days. 12 Tablet 0    Biktarvy tab tablet Take 1 Tablet by mouth daily. ibuprofen (MOTRIN) 800 mg tablet Take 1 Tablet by mouth every six (6) hours as needed for Pain. 25 Tablet 0       Past History     Past Medical History:  Past Medical History:   Diagnosis Date    HIV (human immunodeficiency virus infection) ([facility] [address]        Past Surgical History:  No past surgical history on file. Family History:  History reviewed. No pertinent family history. Social History:  Social History     Tobacco Use    Smoking status: Never    Smokeless tobacco: Never   Substance Use Topics    Alcohol use: Never    Drug use: Yes     Types: Marijuana     Comment: daily       Allergies:  No Known Allergies    Review of Systems   Review of Systems  Constitutional: Negative except as in HPI. Eyes: Negative except as in HPI.  ENT: Negative except as in HPI. Cardiovascular: Negative except as in HPI. Respiratory: Negative except as in HPI. Gastrointestinal: Negative except as in HPI. Genitourinary: Negative except as in HPI. Musculoskeletal: Negative except as in HPI. Integumentary: Negative except as in HPI. Neurological: Negative except as in HPI. Psychiatric: Negative except as in HPI. Endocrine: Negative except as in HPI. Hematologic/Lymphatic: Negative except as in HPI. Allergic/Immunologic: Negative except as in HPI. Physical Exam   Physical Exam  Constitutional:       Appearance: Normal appearance. HENT:      Head: Normocephalic and atraumatic. Nose: Nose normal.      Mouth/Throat:      Mouth: Mucous membranes are moist.   Eyes:      Conjunctiva/sclera: Conjunctivae normal.      Pupils: Pupils are equal, round, and reactive to light. Cardiovascular:      Rate and Rhythm: Normal rate and regular rhythm. Heart sounds: Normal heart sounds.    Pulmonary:      Effort: Pulmonary effort is normal.      Breath sounds: Normal breath sounds. Chest:      Comments: No crepitus  Abdominal:      General: There is no distension. Palpations: Abdomen is soft. Tenderness: There is no abdominal tenderness. Musculoskeletal:         General: No tenderness or deformity. Normal range of motion. Cervical back: Normal range of motion and neck supple. Skin:     General: Skin is warm and dry. Neurological:      General: No focal deficit present. Mental Status: She is alert and oriented to person, place, and time. Psychiatric:         Mood and Affect: Mood normal.         Behavior: Behavior normal.     Lab and Diagnostic Study Results   Labs -     Recent Results (from the past 12 hour(s))   CBC WITH AUTOMATED DIFF    Collection Time: 09/18/22  1:49 PM   Result Value Ref Range    WBC 9.9 3.6 - 11.0 K/uL    RBC 3.72 (L) 3.80 - 5.20 M/uL    HGB 12.7 11.5 - 16.0 g/dL    HCT 37.1 35.0 - 47.0 %    MCV 99.7 (H) 80.0 - 99.0 FL    MCH 34.1 (H) 26.0 - 34.0 PG    MCHC 34.2 30.0 - 36.5 g/dL    RDW 12.3 11.5 - 14.5 %    PLATELET 520 842 - 031 K/uL    MPV 10.6 8.9 - 12.9 FL    NRBC 0.0 0.0  WBC    ABSOLUTE NRBC 0.00 0.00 - 0.01 K/uL    NEUTROPHILS 82 (H) 32 - 75 %    LYMPHOCYTES 14 12 - 49 %    MONOCYTES 4 (L) 5 - 13 %    EOSINOPHILS 0 0 - 7 %    BASOPHILS 0 0 - 1 %    IMMATURE GRANULOCYTES 0 0 - 0.5 %    ABS. NEUTROPHILS 8.0 1.8 - 8.0 K/UL    ABS. LYMPHOCYTES 1.4 0.8 - 3.5 K/UL    ABS. MONOCYTES 0.4 0.0 - 1.0 K/UL    ABS. EOSINOPHILS 0.0 0.0 - 0.4 K/UL    ABS. BASOPHILS 0.0 0.0 - 0.1 K/UL    ABS. IMM.  GRANS. 0.0 0.00 - 0.04 K/UL    DF AUTOMATED     METABOLIC PANEL, COMPREHENSIVE    Collection Time: 09/18/22  1:49 PM   Result Value Ref Range    Sodium 137 136 - 145 mmol/L    Potassium 3.5 3.5 - 5.1 mmol/L    Chloride 105 97 - 108 mmol/L    CO2 22 21 - 32 mmol/L    Anion gap 10 5 - 15 mmol/L    Glucose 89 65 - 100 mg/dL    BUN 14 6 - 20 mg/dL    Creatinine 1.09 (H) 0.55 - 1.02 mg/dL    BUN/Creatinine ratio 13 12 - 20      GFR est AA >60 >60 ml/min/1.73m2    GFR est non-AA >60 >60 ml/min/1.73m2    Calcium 9.4 8.5 - 10.1 mg/dL    Bilirubin, total 0.5 0.2 - 1.0 mg/dL    AST (SGOT) 16 15 - 37 U/L    ALT (SGPT) 19 12 - 78 U/L    Alk. phosphatase 52 45 - 117 U/L    Protein, total 8.2 6.4 - 8.2 g/dL    Albumin 4.1 3.5 - 5.0 g/dL    Globulin 4.1 (H) 2.0 - 4.0 g/dL    A-G Ratio 1.0 (L) 1.1 - 2.2     URINALYSIS W/ RFLX MICROSCOPIC    Collection Time: 09/18/22  4:00 PM   Result Value Ref Range    Color Yellow/Straw      Appearance Hazy (A) Clear      Specific gravity 1.015 1.003 - 1.030      pH (UA) 6.0      Protein Trace (A) Negative mg/dL    Glucose Negative Negative mg/dL    Ketone 40 (A) Negative mg/dL    Bilirubin Negative Negative      Blood Negative Negative      Urobilinogen 0.1 (L) 0.2 - 1.0 EU/dL    Nitrites Negative Negative      Leukocyte Esterase Negative Negative     HCG URINE, QL    Collection Time: 09/18/22  4:00 PM   Result Value Ref Range    HCG urine, QL Negative Negative     URINE MICROSCOPIC    Collection Time: 09/18/22  4:00 PM   Result Value Ref Range    WBC 0-4 0 - 4 /hpf    RBC 0-5 0 - 5 /hpf    Bacteria Negative Negative /hpf    Mucus 3+ (A) Negative /lpf       Radiologic Studies -   @lastxrresult@  CT Results  (Last 48 hours)      None          CXR Results  (Last 48 hours)                 09/18/22 1408  XR CHEST PORT Final result    Impression:  No acute process. Narrative:  EXAM:  XR CHEST PORT       INDICATION: Chest pain and vomiting       COMPARISON: None. TECHNIQUE: Portable AP upright chest view at 1407 hours       FINDINGS: The cardiomediastinal contours are within normal limits. The lungs and   pleural spaces are clear. There is no pneumothorax. The bones and upper abdomen   are unremarkable. Medical Decision Making and ED Course   Differential Diagnosis & Medical Decision Making Provider Note:   See ED course    - I am the first provider for this patient.   I reviewed the vital signs, available nursing notes, past medical history, past surgical history, family history and social history. The patients presenting problems have been discussed, and they are in agreement with the care plan formulated and outlined with them. I have encouraged them to ask questions as they arise throughout their visit. Vital Signs-Reviewed the patient's vital signs. Patient Vitals for the past 12 hrs:   Temp Pulse Resp BP SpO2   09/18/22 1309 99.4 °F (37.4 °C) 64 16 (!) 153/62 100 %       ED Course:   ED Course as of 09/18/22 1643   Sun Sep 18, 2022   [age] old female presenting for reevaluation of vomiting. Symptoms consistent with her periods in the past but she was unable to fill her Zofran prescription at home. Suspect chest pain is due to vomiting and will rule out esophageal injury with chest x-ray. Basic labs given persistence of symptoms to evaluate for dehydration. UA to evaluate for UTI. Treating symptoms with Zofran, Toradol and IV fluid bolus. [BQ]   1420 ECG performed at 1314 and evaluated by me shows normal sinus rhythm with sinus arrhythmia, normal intervals, ventricular rate of 63 and no ST elevation [BQ]   1425 XR CHEST PORT  IMPRESSION  No acute process. [BQ]      ED Course User Index  [BQ] Tom Diego MD         Procedures   Performed by: Ivania Neil MD  Procedures      Disposition   Disposition: DC- Adult Discharges: All of the diagnostic tests were reviewed and questions answered. Diagnosis, care plan and treatment options were discussed. The patient understands the instructions and will follow up as directed. The patients results have been reviewed with them. They have been counseled regarding their diagnosis. The patient verbally convey understanding and agreement of the signs, symptoms, diagnosis, treatment and prognosis and additionally agrees to follow up as recommended with their PCP in 24 - 48 hours.   They also agree with the care-plan and convey that all of their questions have been answered. I have also put together some discharge instructions for them that include: 1) educational information regarding their diagnosis, 2) how to care for their diagnosis at home, as well a 3) list of reasons why they would want to return to the ED prior to their follow-up appointment, should their condition change. DISCHARGE PLAN:  1. Current Discharge Medication List        CONTINUE these medications which have NOT CHANGED    Details   !! ibuprofen (MOTRIN) 800 mg tablet Take 1 Tablet by mouth every eight (8) hours as needed for Pain for up to 7 days. Qty: 20 Tablet, Refills: 0      ondansetron hcl (Zofran) 4 mg tablet Take 1 Tablet by mouth every eight (8) hours as needed for Nausea or Vomiting for up to 4 days. Qty: 12 Tablet, Refills: 0      Biktarvy tab tablet Take 1 Tablet by mouth daily. !! ibuprofen (MOTRIN) 800 mg tablet Take 1 Tablet by mouth every six (6) hours as needed for Pain. Qty: 25 Tablet, Refills: 0       !! - Potential duplicate medications found. Please discuss with provider. 2.   Follow-up Information       Follow up With Specialties Details Why Contact Robert Shukla NP Nurse Practitioner In 2 days            3. Return to ED if worse   4. Current Discharge Medication List         Remove if admitted/transferred    Diagnosis/Clinical Impression     Clinical Impression:   1. Nausea and vomiting, unspecified vomiting type        Attestations: Luis Armando Alcaraz MD, am the primary clinician of record. Please note that this dictation was completed with [employer], the computer voice recognition software. Quite often unanticipated grammatical, syntax, homophones, and other interpretive errors are inadvertently transcribed by the computer software. Please disregard these errors. Please excuse any errors that have escaped final proofreading. Thank you.

## 2022-09-19 ENCOUNTER — HOSPITAL ENCOUNTER (EMERGENCY)
Age: 22
Discharge: HOME | End: 2022-09-19
Attending: STUDENT IN AN ORGANIZED HEALTH CARE EDUCATION/TRAINING PROGRAM
Payer: MEDICAID

## 2022-09-19 VITALS
HEART RATE: 65 BPM | BODY MASS INDEX: 18.36 KG/M2 | DIASTOLIC BLOOD PRESSURE: 76 MMHG | OXYGEN SATURATION: 100 % | SYSTOLIC BLOOD PRESSURE: 134 MMHG | WEIGHT: 117 LBS | RESPIRATION RATE: 16 BRPM | TEMPERATURE: 98.1 F | HEIGHT: 67 IN

## 2022-09-19 DIAGNOSIS — R11.2 NAUSEA AND VOMITING, UNSPECIFIED VOMITING TYPE: Primary | ICD-10-CM

## 2022-09-19 DIAGNOSIS — R07.89 MUSCULOSKELETAL CHEST PAIN: ICD-10-CM

## 2022-09-19 PROCEDURE — 99283 EMERGENCY DEPT VISIT LOW MDM: CPT

## 2022-09-19 PROCEDURE — 74011250637 HC RX REV CODE- 250/637: Performed by: STUDENT IN AN ORGANIZED HEALTH CARE EDUCATION/TRAINING PROGRAM

## 2022-09-19 PROCEDURE — 74011000250 HC RX REV CODE- 250: Performed by: STUDENT IN AN ORGANIZED HEALTH CARE EDUCATION/TRAINING PROGRAM

## 2022-09-19 RX ORDER — HYDROXYZINE 50 MG/1
25 TABLET, FILM COATED ORAL
Qty: 5 TABLET | Refills: 0 | Status: SHIPPED | OUTPATIENT
Start: 2022-09-19 | End: 2022-09-22

## 2022-09-19 RX ORDER — LIDOCAINE 4 G/100G
1 PATCH TOPICAL EVERY 24 HOURS
Status: DISCONTINUED | OUTPATIENT
Start: 2022-09-19 | End: 2022-09-19 | Stop reason: HOSPADM

## 2022-09-19 RX ORDER — LIDOCAINE 50 MG/G
PATCH TOPICAL
Qty: 10 EACH | Refills: 0 | Status: SHIPPED | OUTPATIENT
Start: 2022-09-19

## 2022-09-19 RX ORDER — ONDANSETRON 4 MG/1
4 TABLET, ORALLY DISINTEGRATING ORAL
Status: COMPLETED | OUTPATIENT
Start: 2022-09-19 | End: 2022-09-19

## 2022-09-19 RX ADMIN — ONDANSETRON 4 MG: 4 TABLET, ORALLY DISINTEGRATING ORAL at 17:41

## 2022-09-19 NOTE — ED PROVIDER NOTES
EMERGENCY DEPARTMENT HISTORY AND PHYSICAL EXAM      Date: 9/19/2022  Patient Name: Philippe Gifford    History of Presenting Illness     Chief Complaint   Patient presents with    Nausea    Vomiting    Chest Pain       History Provided By: Patient    HPI: Philippe Gifford, 25 y.o. female with past medical history of HIV presents for evaluation of sternal pain and vomiting. Patient was seen in this emergency department yesterday and the day before for similar symptoms. She endorsed similar symptoms with onset of her menstrual cycle, which is the case currently. She was discharged yesterday after improvement of her symptoms with IV fluids and Zofran. She was able to fill her Zofran prescription and took 1 dose of that but vomited just after taking it. Is approximately 5 hours prior to arrival and she has not had any vomiting since then. She is additionally concerned that she is having symptoms of marijuana withdrawal and she is currently trying to stop using marijuana. Chest pain is sternal, worse with palpation or movement. She used Tylenol once today. She states that she feels very anxious and restless and wonders if her symptoms are being exacerbated by anxiety; she requests a prescription for anxiety medication    There are no other complaints, changes, or physical findings at this time. PCP: Nadene Ahumada, NP    No current facility-administered medications on file prior to encounter. Current Outpatient Medications on File Prior to Encounter   Medication Sig Dispense Refill    ibuprofen (MOTRIN) 800 mg tablet Take 1 Tablet by mouth every eight (8) hours as needed for Pain for up to 7 days. 20 Tablet 0    ondansetron hcl (Zofran) 4 mg tablet Take 1 Tablet by mouth every eight (8) hours as needed for Nausea or Vomiting for up to 4 days. 12 Tablet 0    Biktarvy tab tablet Take 1 Tablet by mouth daily. ibuprofen (MOTRIN) 800 mg tablet Take 1 Tablet by mouth every six (6) hours as needed for Pain.  25 Tablet 0       Past History     Past Medical History:  Past Medical History:   Diagnosis Date    HIV (human immunodeficiency virus infection) ([facility] [address]        Past Surgical History:  History reviewed. No pertinent surgical history. Family History:  History reviewed. No pertinent family history. Social History:  Social History     Tobacco Use    Smoking status: Never    Smokeless tobacco: Never   Substance Use Topics    Alcohol use: Never    Drug use: Yes     Types: Marijuana     Comment: daily       Allergies:  No Known Allergies    Review of Systems   Review of Systems  Constitutional: Negative except as in HPI. Eyes: Negative except as in HPI.  ENT: Negative except as in HPI. Cardiovascular: Negative except as in HPI. Respiratory: Negative except as in HPI. Gastrointestinal: Negative except as in HPI. Genitourinary: Negative except as in HPI. Musculoskeletal: Negative except as in HPI. Integumentary: Negative except as in HPI. Neurological: Negative except as in HPI. Psychiatric: Negative except as in HPI. Endocrine: Negative except as in HPI. Hematologic/Lymphatic: Negative except as in HPI. Allergic/Immunologic: Negative except as in HPI. Physical Exam   Physical Exam  Constitutional:       Appearance: She is not toxic-appearing. HENT:      Head: Normocephalic and atraumatic. Nose: No congestion. Mouth/Throat:      Mouth: Mucous membranes are moist.   Eyes:      Extraocular Movements: Extraocular movements intact. Conjunctiva/sclera: Conjunctivae normal.      Pupils: Pupils are equal, round, and reactive to light. Cardiovascular:      Rate and Rhythm: Normal rate and regular rhythm. Pulmonary:      Effort: Pulmonary effort is normal.      Breath sounds: No wheezing, rhonchi or rales. Chest:      Comments: Sternal chest wall tender to palpation without deformity  Abdominal:      General: There is no distension. Palpations: Abdomen is soft. Tenderness:  There is no abdominal tenderness. Musculoskeletal:         General: No swelling or deformity. Cervical back: Normal range of motion. Skin:     General: Skin is warm and dry. Neurological:      General: No focal deficit present. Mental Status: She is alert. Psychiatric:         Mood and Affect: Mood normal.         Behavior: Behavior normal.     Lab and Diagnostic Study Results   Labs -   No results found for this or any previous visit (from the past 12 hour(s)). Radiologic Studies -   @lastxrresult@  CT Results  (Last 48 hours)      None          CXR Results  (Last 48 hours)                 09/18/22 1408  XR CHEST PORT Final result    Impression:  No acute process. Narrative:  EXAM:  XR CHEST PORT       INDICATION: Chest pain and vomiting       COMPARISON: None. TECHNIQUE: Portable AP upright chest view at 1407 hours       FINDINGS: The cardiomediastinal contours are within normal limits. The lungs and   pleural spaces are clear. There is no pneumothorax. The bones and upper abdomen   are unremarkable. Medical Decision Making and ED Course   Differential Diagnosis & Medical Decision Making Provider Note:   77-year-old female presenting for evaluation of nausea and vomiting as well as chest wall pain. Will provide additional treatment with Lidoderm patch and a short course of hydroxyzine for likely anxiety component. Extensive work-up yesterday    - I am the first provider for this patient. I reviewed the vital signs, available nursing notes, past medical history, past surgical history, family history and social history. The patients presenting problems have been discussed, and they are in agreement with the care plan formulated and outlined with them. I have encouraged them to ask questions as they arise throughout their visit. Vital Signs-Reviewed the patient's vital signs.   Patient Vitals for the past 12 hrs:   Temp Pulse Resp BP SpO2   09/19/22 1703 98.1 °F (36.7 °C) 65 16 134/76 100 %       ED Course:          Procedures   Performed by: Emani Morales MD  Procedures      Disposition   Disposition: DC- Adult Discharges: All of the diagnostic tests were reviewed and questions answered. Diagnosis, care plan and treatment options were discussed. The patient understands the instructions and will follow up as directed. The patients results have been reviewed with them. They have been counseled regarding their diagnosis. The patient verbally convey understanding and agreement of the signs, symptoms, diagnosis, treatment and prognosis and additionally agrees to follow up as recommended with their PCP in 24 - 48 hours. They also agree with the care-plan and convey that all of their questions have been answered. I have also put together some discharge instructions for them that include: 1) educational information regarding their diagnosis, 2) how to care for their diagnosis at home, as well a 3) list of reasons why they would want to return to the ED prior to their follow-up appointment, should their condition change. DISCHARGE PLAN:  1. Current Discharge Medication List        START taking these medications    Details   lidocaine (Lidoderm) 5 % Apply patch to the affected area for 12 hours a day and remove for 12 hours a day. Qty: 10 Each, Refills: 0      hydrOXYzine HCL (ATARAX) 50 mg tablet Take 0.5 Tablets by mouth three (3) times daily as needed for Anxiety for up to 3 days. Qty: 5 Tablet, Refills: 0           CONTINUE these medications which have NOT CHANGED    Details   !! ibuprofen (MOTRIN) 800 mg tablet Take 1 Tablet by mouth every eight (8) hours as needed for Pain for up to 7 days. Qty: 20 Tablet, Refills: 0      ondansetron hcl (Zofran) 4 mg tablet Take 1 Tablet by mouth every eight (8) hours as needed for Nausea or Vomiting for up to 4 days. Qty: 12 Tablet, Refills: 0      Biktarvy tab tablet Take 1 Tablet by mouth daily.       !! ibuprofen (MOTRIN) 800 mg tablet Take 1 Tablet by mouth every six (6) hours as needed for Pain. Qty: 25 Tablet, Refills: 0       !! - Potential duplicate medications found. Please discuss with provider. 2.   Follow-up Information       Follow up With Specialties Details Why Contact Info    Suleman Mai NP Nurse Practitioner In 2 days            3. Return to ED if worse   4. Discharge Medication List as of 9/19/2022  5:29 PM        START taking these medications    Details   lidocaine (Lidoderm) 5 % Apply patch to the affected area for 12 hours a day and remove for 12 hours a day., Normal, Disp-10 Each, R-0      hydrOXYzine HCL (ATARAX) 50 mg tablet Take 0.5 Tablets by mouth three (3) times daily as needed for Anxiety for up to 3 days. , Normal, Disp-5 Tablet, R-0           CONTINUE these medications which have NOT CHANGED    Details   !! ibuprofen (MOTRIN) 800 mg tablet Take 1 Tablet by mouth every eight (8) hours as needed for Pain for up to 7 days. , Normal, Disp-20 Tablet, R-0      ondansetron hcl (Zofran) 4 mg tablet Take 1 Tablet by mouth every eight (8) hours as needed for Nausea or Vomiting for up to 4 days. , Normal, Disp-12 Tablet, R-0      Biktarvy tab tablet Take 1 Tablet by mouth daily. , Historical Med, [facility]      !! ibuprofen (MOTRIN) 800 mg tablet Take 1 Tablet by mouth every six (6) hours as needed for Pain., Normal, Disp-25 Tablet, R-0       !! - Potential duplicate medications found. Please discuss with provider. Diagnosis/Clinical Impression     Clinical Impression:   1. Nausea and vomiting, unspecified vomiting type    2. Musculoskeletal chest pain        Attestations: Mika MANUEL MD, am the primary clinician of record. Please note that this dictation was completed with [email or web address], the [employer] voice recognition software. Quite often unanticipated grammatical, syntax, homophones, and other interpretive errors are inadvertently transcribed by the computer software.   Please disregard these errors. Please excuse any errors that have escaped final proofreading. Thank you.

## 2022-09-19 NOTE — ED TRIAGE NOTES
Patient states she usually gets nauseous and throws up on her first day of her period. Says she usually smokes marijuana to help with symptoms. No longer wants to smoke marijuana so here for help with symptoms. 3rd ER visit for same, including yesterday seen in this ed , labs and xrays done, negative findings.  discharged

## 2022-09-19 NOTE — ROUTINE PROCESS
Discharge RX and Followup reviewed with pt. Pt verbalized understanding. NAD. Ambulated self from ED with family.

## 2022-09-20 LAB
ATRIAL RATE: 63 BPM
CALCULATED P AXIS, ECG09: 54 DEGREES
CALCULATED R AXIS, ECG10: 81 DEGREES
CALCULATED T AXIS, ECG11: 50 DEGREES
DIAGNOSIS, 93000: NORMAL
P-R INTERVAL, ECG05: 144 MS
Q-T INTERVAL, ECG07: 410 MS
QRS DURATION, ECG06: 80 MS
QTC CALCULATION (BEZET), ECG08: 419 MS
VENTRICULAR RATE, ECG03: 63 BPM

## 2022-10-05 ENCOUNTER — OFFICE VISIT (OUTPATIENT)
Dept: OBGYN CLINIC | Age: 22
End: 2022-10-05
Payer: MEDICAID

## 2022-10-05 VITALS
HEIGHT: 67 IN | HEART RATE: 89 BPM | SYSTOLIC BLOOD PRESSURE: 121 MMHG | DIASTOLIC BLOOD PRESSURE: 80 MMHG | BODY MASS INDEX: 18 KG/M2 | WEIGHT: 114.7 LBS | OXYGEN SATURATION: 100 % | RESPIRATION RATE: 20 BRPM

## 2022-10-05 DIAGNOSIS — Z01.419 ROUTINE GYNECOLOGICAL EXAMINATION: Primary | ICD-10-CM

## 2022-10-05 PROCEDURE — 99395 PREV VISIT EST AGE 18-39: CPT | Performed by: OBSTETRICS & GYNECOLOGY

## 2022-10-05 RX ORDER — NORGESTIMATE AND ETHINYL ESTRADIOL 7DAYSX3 28
1 KIT ORAL DAILY
Qty: 84 TABLET | Refills: 3 | Status: SHIPPED | OUTPATIENT
Start: 2022-10-05

## 2022-10-05 NOTE — PROGRESS NOTES
HISTORY OF PRESENT ILLNESS  Alexander Foley is a 25 y.o. female who presents today for the following:  Chief Complaint   Patient presents with    Annual Exam   Patient is a 20-year-old G1 A1 female who presents today for routine annual exam.  She reports that she is having painful heavy periods.     No Known Allergies    Current Outpatient Medications   Medication Sig    lidocaine (Lidoderm) 5 % Apply patch to the affected area for 12 hours a day and remove for 12 hours a day. Biktarvy tab tablet Take 1 Tablet by mouth daily. ibuprofen (MOTRIN) 800 mg tablet Take 1 Tablet by mouth every six (6) hours as needed for Pain. No current facility-administered medications for this visit. Past Medical History:   Diagnosis Date    HIV (human immunodeficiency virus infection) ([facility] [address]        History reviewed. No pertinent surgical history. History reviewed. No pertinent family history. Social History     Socioeconomic History    Marital status: SINGLE     Spouse name: Not on file    Number of children: Not on file    Years of education: Not on file    Highest education level: Not on file   Occupational History    Not on file   Tobacco Use    Smoking status: Never    Smokeless tobacco: Never   Substance and Sexual Activity    Alcohol use: Never    Drug use: Yes     Types: Marijuana     Comment: daily    Sexual activity: Not on file   Other Topics Concern    Not on file   Social History Narrative    Not on file     Social Determinants of Health     Financial Resource Strain: Not on file   Food Insecurity: Not on file   Transportation Needs: Not on file   Physical Activity: Not on file   Stress: Not on file   Social Connections: Not on file   Intimate Partner Violence: Not on file   Housing Stability: Not on file           REVIEW OF SYSTEMS     Constitutional: Negative for chills, fever and malaise/fatigue. HENT: Negative for congestion, hearing loss and sore throat.     Respiratory: Negative for cough, sputum production, shortness of breath and wheezing. Cardiovascular: Negative for chest pain. Gastrointestinal: Negative for abdominal pain, constipation, diarrhea, nausea and vomiting. Genitourinary: Negative for dysuria, flank pain, hematuria and urgency. Neurological: Negative for dizziness, loss of consciousness, weakness and headaches. Psychiatric/Behavioral: Negative for depression. PHYSICAL EXAM  /80 (BP 1 Location: Right upper arm, BP Patient Position: Sitting, BP Cuff Size: Adult)   Pulse 89   Resp 20   Ht 5' 7\" (1.702 m)   Wt 114 lb 11.2 oz (52 kg)   LMP 09/17/2022   SpO2 100%   BMI 17.96 kg/m²      Patient is a well-developed well-nourished female no apparent distress  She is alert and oriented x3  Head is normocephalic atraumatic pupils equal round react light accommodation  Neck is supple without adenopathy or thyromegaly  Heart is with regular rate and rhythm without murmurs rubs or gallops  Lungs are clear to auscultation and percussion bilaterally  Breasts are without masses bilaterally  Abdomen is soft nontender nondistended bowel sounds are present and active  Extremities are without clubbing cyanosis or edema  Pulses are full and symmetric bilaterally  Pelvic  External genitalia within normal limits  Urethra is midline there are no apparent urethral lesions the bladder is within normal limits  Vagina is with normal rugae there is minimal discharge present in the vaginal vault  Cervix is nuliparous, there are no apparent cervical lesions, there is no cervical motion tenderness  Uterus is normal size and contours  Adnexa are without masses    ASSESSMENT and PLAN  Normal annual exam with heavy painful menses  Plan: Pap performed, OCP prescribed for painful heavy menses, follow-up as needed and yearly  No results found for this visit on 10/05/22.     Orders Placed This Encounter    PAP IG, CT-NG-TV, RFX APTIMA HPV ASCUS ([id number]) ([facility])     Order Specific Question: Pap Source? Answer:   Endocervical     Order Specific Question:   Total Hysterectomy? Answer:   No     Order Specific Question:   Supracervical Hysterectomy? Answer:   No     Order Specific Question:   Post Menopausal?     Answer:   No     Order Specific Question:   Hormone Therapy? Answer:   No     Order Specific Question:   IUD? Answer:   No     Order Specific Question:   Abnormal Bleeding? Answer:   No     Order Specific Question:   Pregnant     Answer:   No     Order Specific Question:   Post Partum? Answer:   No     Order Specific Question:   Pap collection method?      Answer:   Jannie Or

## 2022-10-05 NOTE — PROGRESS NOTES
Chief Complaint   Patient presents with    Annual Exam     1. Have you been to the ER, urgent care clinic since your last visit? Hospitalized since your last visit? No    2. Have you seen or consulted any other health care providers outside of the [address] [place] [address] since your last visit? Include any pap smears or colon screening.  No    Visit Vitals  /80 (BP 1 Location: Right upper arm, BP Patient Position: Sitting, BP Cuff Size: Adult)   Pulse 89   Resp 20   Ht 5' 7\" (1.702 m)   Wt 114 lb 11.2 oz (52 kg)   LMP 09/17/2022   SpO2 100%   BMI 17.96 kg/m²

## 2022-10-13 LAB
C TRACH RRNA CVX QL NAA+PROBE: NEGATIVE
CYTOLOGIST CVX/VAG CYTO: ABNORMAL
CYTOLOGY CVX/VAG DOC CYTO: ABNORMAL
CYTOLOGY CVX/VAG DOC THIN PREP: ABNORMAL
DX ICD CODE: ABNORMAL
DX ICD CODE: ABNORMAL
LABCORP, 190119: ABNORMAL
Lab: ABNORMAL
N GONORRHOEA RRNA CVX QL NAA+PROBE: NEGATIVE
OTHER STN SPEC: ABNORMAL
PATHOLOGIST CVX/VAG CYTO: ABNORMAL
STAT OF ADQ CVX/VAG CYTO-IMP: ABNORMAL
T VAGINALIS RRNA SPEC QL NAA+PROBE: NEGATIVE

## 2022-11-21 ENCOUNTER — HOSPITAL ENCOUNTER (EMERGENCY)
Age: 22
Discharge: HOME | End: 2022-11-21
Attending: EMERGENCY MEDICINE
Payer: MEDICAID

## 2022-11-21 ENCOUNTER — APPOINTMENT (OUTPATIENT)
Dept: CT IMAGING | Age: 22
End: 2022-11-21
Attending: EMERGENCY MEDICINE
Payer: MEDICAID

## 2022-11-21 VITALS
SYSTOLIC BLOOD PRESSURE: 127 MMHG | DIASTOLIC BLOOD PRESSURE: 69 MMHG | OXYGEN SATURATION: 98 % | WEIGHT: 115 LBS | HEIGHT: 67 IN | HEART RATE: 73 BPM | TEMPERATURE: 97.7 F | RESPIRATION RATE: 14 BRPM | BODY MASS INDEX: 18.05 KG/M2

## 2022-11-21 DIAGNOSIS — K52.9 GASTROENTERITIS, ACUTE: Primary | ICD-10-CM

## 2022-11-21 LAB
ALBUMIN SERPL-MCNC: 4.1 G/DL (ref 3.5–5)
ALBUMIN/GLOB SERPL: 1 {RATIO} (ref 1.1–2.2)
ALP SERPL-CCNC: 46 U/L (ref 45–117)
ALT SERPL-CCNC: 21 U/L (ref 12–78)
ANION GAP SERPL CALC-SCNC: 9 MMOL/L (ref 5–15)
APPEARANCE UR: ABNORMAL
AST SERPL W P-5'-P-CCNC: 30 U/L (ref 15–37)
BACTERIA URNS QL MICRO: NEGATIVE /HPF
BACTERIA URNS QL MICRO: NEGATIVE /HPF
BASOPHILS # BLD: 0.1 K/UL (ref 0–0.1)
BASOPHILS NFR BLD: 0 % (ref 0–1)
BILIRUB SERPL-MCNC: 0.5 MG/DL (ref 0.2–1)
BILIRUB UR QL: NEGATIVE
BUN SERPL-MCNC: 17 MG/DL (ref 6–20)
BUN/CREAT SERPL: 17 (ref 12–20)
CA-I BLD-MCNC: 9.7 MG/DL (ref 8.5–10.1)
CHLORIDE SERPL-SCNC: 105 MMOL/L (ref 97–108)
CO2 SERPL-SCNC: 22 MMOL/L (ref 21–32)
COLOR UR: ABNORMAL
CREAT SERPL-MCNC: 0.99 MG/DL (ref 0.55–1.02)
DIFFERENTIAL METHOD BLD: ABNORMAL
EOSINOPHIL # BLD: 0.1 K/UL (ref 0–0.4)
EOSINOPHIL NFR BLD: 1 % (ref 0–7)
ERYTHROCYTE [DISTWIDTH] IN BLOOD BY AUTOMATED COUNT: 12.3 % (ref 11.5–14.5)
GLOBULIN SER CALC-MCNC: 4.1 G/DL (ref 2–4)
GLUCOSE SERPL-MCNC: 116 MG/DL (ref 65–100)
GLUCOSE UR STRIP.AUTO-MCNC: NEGATIVE MG/DL
HCG UR QL: NEGATIVE
HCT VFR BLD AUTO: 40 % (ref 35–47)
HGB BLD-MCNC: 13.3 G/DL (ref 11.5–16)
HGB UR QL STRIP: NEGATIVE
IMM GRANULOCYTES # BLD AUTO: 0.1 K/UL (ref 0–0.04)
IMM GRANULOCYTES NFR BLD AUTO: 1 % (ref 0–0.5)
KETONES UR QL STRIP.AUTO: 5 MG/DL
LEUKOCYTE ESTERASE UR QL STRIP.AUTO: NEGATIVE
LIPASE SERPL-CCNC: 99 U/L (ref 73–393)
LYMPHOCYTES # BLD: 3 K/UL (ref 0.8–3.5)
LYMPHOCYTES NFR BLD: 22 % (ref 12–49)
MCH RBC QN AUTO: 33.4 PG (ref 26–34)
MCHC RBC AUTO-ENTMCNC: 33.3 G/DL (ref 30–36.5)
MCV RBC AUTO: 100.5 FL (ref 80–99)
MONOCYTES # BLD: 0.3 K/UL (ref 0–1)
MONOCYTES NFR BLD: 3 % (ref 5–13)
MUCOUS THREADS URNS QL MICRO: ABNORMAL /LPF
NEUTS SEG # BLD: 9.9 K/UL (ref 1.8–8)
NEUTS SEG NFR BLD: 73 % (ref 32–75)
NITRITE UR QL STRIP.AUTO: NEGATIVE
NRBC # BLD: 0 K/UL (ref 0–0.01)
NRBC BLD-RTO: 0 PER 100 WBC
PH UR STRIP: 5 [PH] (ref 5–8)
PLATELET # BLD AUTO: 302 K/UL (ref 150–400)
PMV BLD AUTO: 10 FL (ref 8.9–12.9)
POTASSIUM SERPL-SCNC: 4.6 MMOL/L (ref 3.5–5.1)
PROT SERPL-MCNC: 8.2 G/DL (ref 6.4–8.2)
PROT UR STRIP-MCNC: 30 MG/DL
RBC # BLD AUTO: 3.98 M/UL (ref 3.8–5.2)
RBC #/AREA URNS HPF: ABNORMAL /HPF (ref 0–5)
RBC #/AREA URNS HPF: NORMAL /HPF (ref 0–5)
SODIUM SERPL-SCNC: 136 MMOL/L (ref 136–145)
SP GR UR REFRACTOMETRY: 1.03 (ref 1–1.03)
UROBILINOGEN UR QL STRIP.AUTO: 0.1 EU/DL (ref 0.1–1)
WBC # BLD AUTO: 13.5 K/UL (ref 3.6–11)
WBC URNS QL MICRO: ABNORMAL /HPF (ref 0–4)
WBC URNS QL MICRO: NORMAL /HPF (ref 0–4)

## 2022-11-21 PROCEDURE — 74011250636 HC RX REV CODE- 250/636: Performed by: EMERGENCY MEDICINE

## 2022-11-21 PROCEDURE — 83690 ASSAY OF LIPASE: CPT

## 2022-11-21 PROCEDURE — 96374 THER/PROPH/DIAG INJ IV PUSH: CPT

## 2022-11-21 PROCEDURE — 81025 URINE PREGNANCY TEST: CPT

## 2022-11-21 PROCEDURE — 74177 CT ABD & PELVIS W/CONTRAST: CPT

## 2022-11-21 PROCEDURE — 80053 COMPREHEN METABOLIC PANEL: CPT

## 2022-11-21 PROCEDURE — 99285 EMERGENCY DEPT VISIT HI MDM: CPT

## 2022-11-21 PROCEDURE — 81001 URINALYSIS AUTO W/SCOPE: CPT

## 2022-11-21 PROCEDURE — 74011000636 HC RX REV CODE- 636: Performed by: EMERGENCY MEDICINE

## 2022-11-21 PROCEDURE — 85025 COMPLETE CBC W/AUTO DIFF WBC: CPT

## 2022-11-21 RX ORDER — DIPHENOXYLATE HYDROCHLORIDE AND ATROPINE SULFATE 2.5; .025 MG/1; MG/1
1 TABLET ORAL
Qty: 20 TABLET | Refills: 0 | Status: SHIPPED | OUTPATIENT
Start: 2022-11-21

## 2022-11-21 RX ORDER — ONDANSETRON 2 MG/ML
4 INJECTION INTRAMUSCULAR; INTRAVENOUS
Status: COMPLETED | OUTPATIENT
Start: 2022-11-21 | End: 2022-11-21

## 2022-11-21 RX ORDER — FAMOTIDINE 20 MG/1
20 TABLET, FILM COATED ORAL 2 TIMES DAILY
Qty: 20 TABLET | Refills: 0 | Status: SHIPPED | OUTPATIENT
Start: 2022-11-21 | End: 2022-12-01

## 2022-11-21 RX ORDER — ONDANSETRON 4 MG/1
4 TABLET, ORALLY DISINTEGRATING ORAL
Qty: 20 TABLET | Refills: 0 | Status: SHIPPED | OUTPATIENT
Start: 2022-11-21

## 2022-11-21 RX ADMIN — SODIUM CHLORIDE 1000 ML: 9 INJECTION, SOLUTION INTRAVENOUS at 04:24

## 2022-11-21 RX ADMIN — IOPAMIDOL 100 ML: 755 INJECTION, SOLUTION INTRAVENOUS at 06:29

## 2022-11-21 RX ADMIN — ONDANSETRON 4 MG: 2 INJECTION INTRAMUSCULAR; INTRAVENOUS at 04:25

## 2022-11-21 NOTE — ED NOTES
RN into room, patient resting comfortably on stretcher.  Denies additional episodes of vomiting since medication administration

## 2022-11-21 NOTE — ED PROVIDER NOTES
EMERGENCY DEPARTMENT HISTORY AND PHYSICAL EXAM      Date: 11/21/2022  Patient Name: Angelique Cordero    History of Presenting Illness     Chief Complaint   Patient presents with    Abdominal Pain       History Provided By: Patient    HPI: Angelique Cordero, 25 y.o. female with a past medical history significant No significant past medical history presents to the ED with chief complaint of Abdominal Pain  . 45-year-old female presents with vague abdominal pain ongoing intermittently for a month. Possibly related to marijuana use. Nausea vomiting crampy abdominal pain diarrhea all nonbloody. Pain actively present now. Denies pregnancy. No meds prior to arrival.        There are no other complaints, changes, or physical findings at this time. PCP: Alycia Murguia NP    Current Outpatient Medications   Medication Sig Dispense Refill    ondansetron (ZOFRAN ODT) 4 mg disintegrating tablet Take 1 Tablet by mouth every eight (8) hours as needed for Nausea or Vomiting. 20 Tablet 0    diphenoxylate-atropine (LomotiL) 2.5-0.025 mg per tablet Take 1 Tablet by mouth four (4) times daily as needed for Diarrhea (Take after each loose stool. ). Max Daily Amount: 4 Tablets. 20 Tablet 0    famotidine (Pepcid) 20 mg tablet Take 1 Tablet by mouth two (2) times a day for 10 days. 20 Tablet 0    Biktarvy tab tablet Take 1 Tablet by mouth daily. norgestimate-ethinyl estradioL (ORTHO TRI-CYCLEN, TRI-SPRINTEC) 0.18/0.215/0.25 mg-35 mcg (28) tab Take 1 Tablet by mouth daily. 84 Tablet 3    lidocaine (Lidoderm) 5 % Apply patch to the affected area for 12 hours a day and remove for 12 hours a day. 10 Each 0    ibuprofen (MOTRIN) 800 mg tablet Take 1 Tablet by mouth every six (6) hours as needed for Pain. 25 Tablet 0       Past History     Past Medical History:  Past Medical History:   Diagnosis Date    HIV (human immunodeficiency virus infection) ([facility] [address]        Past Surgical History:  History reviewed.  No pertinent surgical history. Family History:  History reviewed. No pertinent family history. Social History:  Social History     Tobacco Use    Smoking status: Never    Smokeless tobacco: Never   Substance Use Topics    Alcohol use: Never    Drug use: Yes     Types: Marijuana     Comment: daily       Allergies:  No Known Allergies      Review of Systems   Review of Systems   Constitutional: Negative. Negative for chills, fatigue and fever. HENT: Negative. Negative for congestion, nosebleeds and sore throat. Eyes: Negative. Negative for pain, discharge and visual disturbance. Respiratory: Negative. Negative for cough, chest tightness and shortness of breath. Cardiovascular:  Negative for chest pain, palpitations and leg swelling. Gastrointestinal:  Positive for abdominal pain, diarrhea, nausea and vomiting. Negative for blood in stool and constipation. Endocrine: Negative. Genitourinary: Negative. Negative for difficulty urinating, dysuria, pelvic pain and vaginal bleeding. Musculoskeletal: Negative. Negative for arthralgias, back pain and myalgias. Skin: Negative. Negative for rash and wound. Allergic/Immunologic: Negative. Neurological: Negative. Negative for dizziness, syncope, weakness, numbness and headaches. Hematological: Negative. Psychiatric/Behavioral: Negative. Negative for agitation, confusion and suicidal ideas. All other systems reviewed and are negative. Physical Exam   Physical Exam  Vitals and nursing note reviewed. Exam conducted with a chaperone present. Constitutional:       Appearance: Normal appearance. She is normal weight. HENT:      Head: Normocephalic and atraumatic. Nose: Nose normal.      Mouth/Throat:      Mouth: Mucous membranes are moist.      Pharynx: Oropharynx is clear. Eyes:      Extraocular Movements: Extraocular movements intact. Conjunctiva/sclera: Conjunctivae normal.      Pupils: Pupils are equal, round, and reactive to light. Cardiovascular:      Rate and Rhythm: Normal rate and regular rhythm. Pulses: Normal pulses. Heart sounds: Normal heart sounds. Pulmonary:      Effort: Pulmonary effort is normal. No respiratory distress. Breath sounds: Normal breath sounds. Abdominal:      General: Abdomen is flat. Bowel sounds are normal. There is no distension. Palpations: Abdomen is soft. Tenderness: There is generalized abdominal tenderness. There is no guarding. Musculoskeletal:         General: No swelling, tenderness, deformity or signs of injury. Normal range of motion. Cervical back: Normal range of motion and neck supple. Right lower leg: No edema. Left lower leg: No edema. Skin:     General: Skin is warm and dry. Capillary Refill: Capillary refill takes less than 2 seconds. Findings: No lesion or rash. Neurological:      General: No focal deficit present. Mental Status: She is alert and oriented to person, place, and time. Mental status is at baseline. Cranial Nerves: No cranial nerve deficit. Psychiatric:         Mood and Affect: Mood normal.         Behavior: Behavior normal.         Thought Content:  Thought content normal.         Judgment: Judgment normal.       Diagnostic Study Results     Labs -     Recent Results (from the past 12 hour(s))   HCG URINE, QL    Collection Time: 11/21/22  4:06 AM   Result Value Ref Range    HCG urine, QL Negative Negative     URINALYSIS W/ RFLX MICROSCOPIC    Collection Time: 11/21/22  4:07 AM   Result Value Ref Range    Color Yellow/Straw      Appearance Turbid (A) Clear      Specific gravity 1.027 1.003 - 1.030      pH (UA) 5.0 5.0 - 8.0      Protein 30 (A) Negative mg/dL    Glucose Negative Negative mg/dL    Ketone 5 (A) Negative mg/dL    Bilirubin Negative Negative      Blood Negative Negative      Urobilinogen 0.1 0.1 - 1.0 EU/dL    Nitrites Negative Negative      Leukocyte Esterase Negative Negative      WBC 0-4 0 - 4 /hpf RBC 0-5 0 - 5 /hpf    Bacteria Negative Negative /hpf    Mucus 3+ /lpf   URINE MICROSCOPIC    Collection Time: 11/21/22  4:07 AM   Result Value Ref Range    WBC 0-4 0 - 4 /hpf    RBC 0-5 0 - 5 /hpf    Bacteria Negative Negative /hpf   CBC WITH AUTOMATED DIFF    Collection Time: 11/21/22  4:11 AM   Result Value Ref Range    WBC 13.5 (H) 3.6 - 11.0 K/uL    RBC 3.98 3.80 - 5.20 M/uL    HGB 13.3 11.5 - 16.0 g/dL    HCT 40.0 35.0 - 47.0 %    .5 (H) 80.0 - 99.0 FL    MCH 33.4 26.0 - 34.0 PG    MCHC 33.3 30.0 - 36.5 g/dL    RDW 12.3 11.5 - 14.5 %    PLATELET 092 995 - 221 K/uL    MPV 10.0 8.9 - 12.9 FL    NRBC 0.0 0.0  WBC    ABSOLUTE NRBC 0.00 0.00 - 0.01 K/uL    NEUTROPHILS 73 32 - 75 %    LYMPHOCYTES 22 12 - 49 %    MONOCYTES 3 (L) 5 - 13 %    EOSINOPHILS 1 0 - 7 %    BASOPHILS 0 0 - 1 %    IMMATURE GRANULOCYTES 1 (H) 0 - 0.5 %    ABS. NEUTROPHILS 9.9 (H) 1.8 - 8.0 K/UL    ABS. LYMPHOCYTES 3.0 0.8 - 3.5 K/UL    ABS. MONOCYTES 0.3 0.0 - 1.0 K/UL    ABS. EOSINOPHILS 0.1 0.0 - 0.4 K/UL    ABS. BASOPHILS 0.1 0.0 - 0.1 K/UL    ABS. IMM. GRANS. 0.1 (H) 0.00 - 0.04 K/UL    DF AUTOMATED     METABOLIC PANEL, COMPREHENSIVE    Collection Time: 11/21/22  4:11 AM   Result Value Ref Range    Sodium 136 136 - 145 mmol/L    Potassium 4.6 3.5 - 5.1 mmol/L    Chloride 105 97 - 108 mmol/L    CO2 22 21 - 32 mmol/L    Anion gap 9 5 - 15 mmol/L    Glucose 116 (H) 65 - 100 mg/dL    BUN 17 6 - 20 mg/dL    Creatinine 0.99 0.55 - 1.02 mg/dL    BUN/Creatinine ratio 17 12 - 20      eGFR >60 >60 ml/min/1.73m2    Calcium 9.7 8.5 - 10.1 mg/dL    Bilirubin, total 0.5 0.2 - 1.0 mg/dL    AST (SGOT) 30 15 - 37 U/L    ALT (SGPT) 21 12 - 78 U/L    Alk.  phosphatase 46 45 - 117 U/L    Protein, total 8.2 6.4 - 8.2 g/dL    Albumin 4.1 3.5 - 5.0 g/dL    Globulin 4.1 (H) 2.0 - 4.0 g/dL    A-G Ratio 1.0 (L) 1.1 - 2.2     LIPASE    Collection Time: 11/21/22  4:11 AM   Result Value Ref Range    Lipase 99 73 - 393 U/L         Radiologic Studies - CT ABD PELV W CONT   Final Result   No acute abnormality in the abdomen or pelvis. CT Results  (Last 48 hours)                 11/21/22 0630  CT ABD PELV W CONT Final result    Impression:  No acute abnormality in the abdomen or pelvis. Narrative:  EXAM:  CT ABD PELV W CONT       INDICATION: Abdominal pain       COMPARISON: CT 8/19/2022. TECHNIQUE: Helical CT of the abdomen  and pelvis  following the uneventful   intravenous administration of nonionic contrast.  Coronal and sagittal reformats   are performed. CT dose reduction was achieved through use of a standardized   protocol tailored for this examination and automatic exposure control for dose   modulation. FINDINGS:    The visualized lung bases demonstrate no mass or consolidation. The heart size   is normal. There is no pericardial or pleural effusion. The liver, spleen, pancreas, and adrenal glands are normal. The gall bladder is   present  without intra- or extra-hepatic biliary dilatation. The kidneys are symmetric without hydronephrosis. There are no dilated bowel loops. The appendix is normal.         There are no enlarged lymph nodes. There is no free fluid or free air. The   aorta is normal in caliber. The urinary bladder is normal.  There is no pelvic mass. The bony structures are age-appropriate. CXR Results  (Last 48 hours)      None            Medical Decision Making and ED Course   I am the first provider for this patient. I reviewed the vital signs, available nursing notes, past medical history, past surgical history, family history and social history. Vital Signs-Reviewed the patient's vital signs.   Patient Vitals for the past 12 hrs:   Temp Pulse Resp BP SpO2   11/21/22 0600 -- 73 14 127/69 98 %   11/21/22 0519 -- (!) 53 18 104/87 100 %   11/21/22 0449 -- 68 20 122/72 --   11/21/22 0419 -- 65 15 (!) 144/82 100 %   11/21/22 0344 97.7 °F (36.5 °C) 76 16 130/81 100 %       EKG interpretation:         Records Reviewed: Previous Hospital chart. EMS run report      ED Course:   Initial assessment performed. The patients presenting problems have been discussed, and they are in agreement with the care plan formulated and outlined with them. I have encouraged them to ask questions as they arise throughout their visit. Orders Placed This Encounter    CT ABD PELV W CONT     Standing Status:   Standing     Number of Occurrences:   1     Order Specific Question: This order utilizes IV contrast.  What additional contrast is needed? Answer:   None     Order Specific Question:   Does patient have history of Renal Disease? Answer:   No     Order Specific Question:   Transport     Answer:   Stretcher [5]     Order Specific Question:   Is Patient Pregnant? Answer:   No     Order Specific Question:   Reason for Exam     Answer:   abd pain     Order Specific Question:   Decision Support Exception     Answer:   Emergency Medical Condition (MA) [1]    URINALYSIS W/ RFLX MICROSCOPIC     Standing Status:   Standing     Number of Occurrences:   1    HCG URINE, QL     Standing Status:   Standing     Number of Occurrences:   1    CBC WITH AUTOMATED DIFF     Standing Status:   Standing     Number of Occurrences:   1    METABOLIC PANEL, COMPREHENSIVE     Standing Status:   Standing     Number of Occurrences:   1    LIPASE     Standing Status:   Standing     Number of Occurrences:   1    URINE MICROSCOPIC     Standing Status:   Standing     Number of Occurrences:   1    sodium chloride 0.9 % bolus infusion 1,000 mL    ondansetron (ZOFRAN) injection 4 mg    iopamidoL (ISOVUE-370) 76 % injection 100 mL    ondansetron (ZOFRAN ODT) 4 mg disintegrating tablet     Sig: Take 1 Tablet by mouth every eight (8) hours as needed for Nausea or Vomiting.      Dispense:  20 Tablet     Refill:  0    diphenoxylate-atropine (LomotiL) 2.5-0.025 mg per tablet     Sig: Take 1 Tablet by mouth four (4) times daily as needed for Diarrhea (Take after each loose stool. ). Max Daily Amount: 4 Tablets. Dispense:  20 Tablet     Refill:  0    famotidine (Pepcid) 20 mg tablet     Sig: Take 1 Tablet by mouth two (2) times a day for 10 days. Dispense:  20 Tablet     Refill:  0                 Provider Notes (Medical Decision Making):   -year-old presents with nausea vomiting diarrhea. Possibly related to marijuana use. No known sick contacts. Improved after medications IV fluids comfortable with discharge will give GI for follow-up. Consults              Discharged    Procedures               Disposition       Emergency Department Disposition:  Discharged      Diagnosis     Clinical Impression:   1. Gastroenteritis, acute        Attestations:    Honey Rueda MD    Please note that this dictation was completed with [employer], the computer voice recognition software. Quite often unanticipated grammatical, syntax, homophones, and other interpretive errors are inadvertently transcribed by the computer software. Please disregard these errors. Please excuse any errors that have escaped final proofreading. Thank you.

## 2022-11-21 NOTE — Clinical Note
[address] [facility] EMERGENCY DEPT  [address] [place] [id number]  [phone]    Work/School Note    Date: 11/21/2022    To Whom It May concern:    Adelina Grimm was seen and treated today in the emergency room by the following provider(s):  Attending Provider: Ryan Be MD.      Adelnia Grimm is excused from work/school on 11/21/22 and 11/22/22. She is medically clear to return to work/school on 11/23/2022.        Sincerely,          Lesley Curling, MD

## 2022-11-21 NOTE — ED NOTES
Patient states she is attempting to quit smoking marijuana, states smoked only 1 time today. States she feels \"this way\" when she doesn't smoke. States she normally smokes before bed and did not today.

## 2022-11-21 NOTE — ED TRIAGE NOTES
Presents with EMS c/o generalized abdominal pain starting at midnight, c/o N/V/D; denies urinary symptoms

## 2022-11-21 NOTE — DISCHARGE INSTRUCTIONS
Thank you! Thank you for allowing me to care for you in the emergency department. It is my goal to provide you with excellent care. If you have not received excellent quality care, please ask to speak to the nurse manager. Please fill out the survey that will come to you by mail or email since we listen to your feedback! Below you will find a list of your tests from today's visit. Should you have any questions, please do not hesitate to call the emergency department.     Labs  Recent Results (from the past 12 hour(s))   HCG URINE, QL    Collection Time: 11/21/22  4:06 AM   Result Value Ref Range    HCG urine, QL Negative Negative     URINALYSIS W/ RFLX MICROSCOPIC    Collection Time: 11/21/22  4:07 AM   Result Value Ref Range    Color Yellow/Straw      Appearance Turbid (A) Clear      Specific gravity 1.027 1.003 - 1.030      pH (UA) 5.0 5.0 - 8.0      Protein 30 (A) Negative mg/dL    Glucose Negative Negative mg/dL    Ketone 5 (A) Negative mg/dL    Bilirubin Negative Negative      Blood Negative Negative      Urobilinogen 0.1 0.1 - 1.0 EU/dL    Nitrites Negative Negative      Leukocyte Esterase Negative Negative      WBC 0-4 0 - 4 /hpf    RBC 0-5 0 - 5 /hpf    Bacteria Negative Negative /hpf    Mucus 3+ /lpf   URINE MICROSCOPIC    Collection Time: 11/21/22  4:07 AM   Result Value Ref Range    WBC 0-4 0 - 4 /hpf    RBC 0-5 0 - 5 /hpf    Bacteria Negative Negative /hpf   CBC WITH AUTOMATED DIFF    Collection Time: 11/21/22  4:11 AM   Result Value Ref Range    WBC 13.5 (H) 3.6 - 11.0 K/uL    RBC 3.98 3.80 - 5.20 M/uL    HGB 13.3 11.5 - 16.0 g/dL    HCT 40.0 35.0 - 47.0 %    .5 (H) 80.0 - 99.0 FL    MCH 33.4 26.0 - 34.0 PG    MCHC 33.3 30.0 - 36.5 g/dL    RDW 12.3 11.5 - 14.5 %    PLATELET 191 820 - 554 K/uL    MPV 10.0 8.9 - 12.9 FL    NRBC 0.0 0.0  WBC    ABSOLUTE NRBC 0.00 0.00 - 0.01 K/uL    NEUTROPHILS 73 32 - 75 %    LYMPHOCYTES 22 12 - 49 %    MONOCYTES 3 (L) 5 - 13 %    EOSINOPHILS 1 0 - 7 % BASOPHILS 0 0 - 1 %    IMMATURE GRANULOCYTES 1 (H) 0 - 0.5 %    ABS. NEUTROPHILS 9.9 (H) 1.8 - 8.0 K/UL    ABS. LYMPHOCYTES 3.0 0.8 - 3.5 K/UL    ABS. MONOCYTES 0.3 0.0 - 1.0 K/UL    ABS. EOSINOPHILS 0.1 0.0 - 0.4 K/UL    ABS. BASOPHILS 0.1 0.0 - 0.1 K/UL    ABS. IMM. GRANS. 0.1 (H) 0.00 - 0.04 K/UL    DF AUTOMATED     METABOLIC PANEL, COMPREHENSIVE    Collection Time: 11/21/22  4:11 AM   Result Value Ref Range    Sodium 136 136 - 145 mmol/L    Potassium 4.6 3.5 - 5.1 mmol/L    Chloride 105 97 - 108 mmol/L    CO2 22 21 - 32 mmol/L    Anion gap 9 5 - 15 mmol/L    Glucose 116 (H) 65 - 100 mg/dL    BUN 17 6 - 20 mg/dL    Creatinine 0.99 0.55 - 1.02 mg/dL    BUN/Creatinine ratio 17 12 - 20      eGFR >60 >60 ml/min/1.73m2    Calcium 9.7 8.5 - 10.1 mg/dL    Bilirubin, total 0.5 0.2 - 1.0 mg/dL    AST (SGOT) 30 15 - 37 U/L    ALT (SGPT) 21 12 - 78 U/L    Alk. phosphatase 46 45 - 117 U/L    Protein, total 8.2 6.4 - 8.2 g/dL    Albumin 4.1 3.5 - 5.0 g/dL    Globulin 4.1 (H) 2.0 - 4.0 g/dL    A-G Ratio 1.0 (L) 1.1 - 2.2     LIPASE    Collection Time: 11/21/22  4:11 AM   Result Value Ref Range    Lipase 99 73 - 393 U/L       Radiologic Studies  CT ABD PELV W CONT   Final Result   No acute abnormality in the abdomen or pelvis. CT Results  (Last 48 hours)                 11/21/22 0630  CT ABD PELV W CONT Final result    Impression:  No acute abnormality in the abdomen or pelvis. Narrative:  EXAM:  CT ABD PELV W CONT       INDICATION: Abdominal pain       COMPARISON: CT 8/19/2022. TECHNIQUE: Helical CT of the abdomen  and pelvis  following the uneventful   intravenous administration of nonionic contrast.  Coronal and sagittal reformats   are performed. CT dose reduction was achieved through use of a standardized   protocol tailored for this examination and automatic exposure control for dose   modulation. FINDINGS:    The visualized lung bases demonstrate no mass or consolidation.  The heart size   is normal. There is no pericardial or pleural effusion. The liver, spleen, pancreas, and adrenal glands are normal. The gall bladder is   present  without intra- or extra-hepatic biliary dilatation. The kidneys are symmetric without hydronephrosis. There are no dilated bowel loops. The appendix is normal.         There are no enlarged lymph nodes. There is no free fluid or free air. The   aorta is normal in caliber. The urinary bladder is normal.  There is no pelvic mass. The bony structures are age-appropriate. CXR Results  (Last 48 hours)      None          ------------------------------------------------------------------------------------------------------------  The exam and treatment you received in the Emergency Department were for an urgent problem and are not intended as complete care. It is important that you follow-up with a doctor, nurse practitioner, or physician assistant to:  (1) confirm your diagnosis,  (2) re-evaluation of changes in your illness and treatment, and  (3) for ongoing care. Please take your discharge instructions with you when you go to your follow-up appointment. If you have any problem arranging a follow-up appointment, contact the Emergency Department. If your symptoms become worse or you do not improve as expected and you are unable to reach your health care provider, please return to the Emergency Department. We are available 24 hours a day. If a prescription has been provided, please have it filled as soon as possible to prevent a delay in treatment. If you have any questions or reservations about taking the medication due to side effects or interactions with other medications, please call your primary care provider or contact the ER.

## 2022-11-21 NOTE — Clinical Note
[address] [facility] EMERGENCY DEPT  [address] [place] [id number]  [phone]    Work/School Note    Date: 11/21/2022    To Whom It May concern:      Monae Deleon was seen and treated today in the emergency room by the following provider(s):  Attending Provider: Berlin Del Toro MD.      Monae Deleon is excused from work/school on 11/21/22. She is clear to return to work/school on 11/22/22.         Sincerely,          Khadar Huggins MD

## 2023-01-09 ENCOUNTER — HOSPITAL ENCOUNTER (EMERGENCY)
Age: 23
Discharge: HOME | End: 2023-01-09
Attending: FAMILY MEDICINE
Payer: MEDICAID

## 2023-01-09 VITALS
HEART RATE: 72 BPM | TEMPERATURE: 98.9 F | HEIGHT: 67 IN | RESPIRATION RATE: 19 BRPM | WEIGHT: 116 LBS | BODY MASS INDEX: 18.21 KG/M2 | DIASTOLIC BLOOD PRESSURE: 66 MMHG | OXYGEN SATURATION: 100 % | SYSTOLIC BLOOD PRESSURE: 106 MMHG

## 2023-01-09 DIAGNOSIS — T14.8XXA ANIMAL BITE: Primary | ICD-10-CM

## 2023-01-09 PROCEDURE — 90375 RABIES IG IM/SC: CPT | Performed by: FAMILY MEDICINE

## 2023-01-09 PROCEDURE — 74011250636 HC RX REV CODE- 250/636: Performed by: FAMILY MEDICINE

## 2023-01-09 PROCEDURE — 99284 EMERGENCY DEPT VISIT MOD MDM: CPT

## 2023-01-09 PROCEDURE — 90471 IMMUNIZATION ADMIN: CPT

## 2023-01-09 PROCEDURE — 90675 RABIES VACCINE IM: CPT | Performed by: FAMILY MEDICINE

## 2023-01-09 PROCEDURE — 74011250637 HC RX REV CODE- 250/637: Performed by: FAMILY MEDICINE

## 2023-01-09 RX ORDER — AMOXICILLIN AND CLAVULANATE POTASSIUM 875; 125 MG/1; MG/1
1 TABLET, FILM COATED ORAL 2 TIMES DAILY
Qty: 14 TABLET | Refills: 0 | Status: SHIPPED | OUTPATIENT
Start: 2023-01-09 | End: 2023-02-20 | Stop reason: ALTCHOICE

## 2023-01-09 RX ORDER — AMOXICILLIN AND CLAVULANATE POTASSIUM 875; 125 MG/1; MG/1
1 TABLET, FILM COATED ORAL EVERY 12 HOURS
Status: DISCONTINUED | OUTPATIENT
Start: 2023-01-09 | End: 2023-01-09 | Stop reason: HOSPADM

## 2023-01-09 RX ADMIN — RABIES IMMUNE GLOBULIN (HUMAN) 1050 UNITS: 300 INJECTION, SOLUTION INFILTRATION; INTRAMUSCULAR at 20:04

## 2023-01-09 RX ADMIN — AMOXICILLIN AND CLAVULANATE POTASSIUM 1 TABLET: 875; 125 TABLET, FILM COATED ORAL at 19:52

## 2023-01-09 RX ADMIN — Medication 2.5 UNITS: at 20:00

## 2023-01-09 NOTE — Clinical Note
[address] [address]  [address] [place] [id number]  [phone]    Work/School Note    Date: 1/9/2023    To Whom It May concern:    Regulo Atkins was seen and treated today in the emergency room by the following provider(s):  Attending Provider: Brittany Sewell DO  Physician Assistant: Freddy Jean-Baptiste PA-C. Regulo Atkins is excused from work/school on 01/09/23 and 01/10/23. She is medically clear to return to work/school on 1/11/2023.        Sincerely,          Eric Abbott PA-C

## 2023-01-10 NOTE — ED PROVIDER NOTES
EMERGENCY DEPARTMENT HISTORY AND PHYSICAL EXAM      Date: 1/9/2023  Patient Name: Angelique Cordero    History of Presenting Illness     Chief Complaint   Patient presents with    Dog Bite       History Provided By: Patient    HPI: Angelique Cordero, 25 y.o. female with HIV, presents after dog bite. Patient states that she was walking around with a friend and came across a dog that she went to pet and it bit her on the side of her face. Does not know the dog's vaccination status. Denies any shortness of breath, difficulty breathing, nausea/vomiting, constipation/diarrhea, abdominal pain, rash, night sweats, or chest pain. There are no other complaints, changes, or physical findings at this time. Past History     Past Medical History:  Past Medical History:   Diagnosis Date    HIV (human immunodeficiency virus infection) ([facility] [[email or web address]ca [facility].)        Past Surgical History:  No past surgical history on file. Family History:  No family history on file. Social History:  Social History     Tobacco Use    Smoking status: Never    Smokeless tobacco: Never   Substance Use Topics    Alcohol use: Never    Drug use: Yes     Types: Marijuana     Comment: daily       Allergies:  No Known Allergies    PCP: Alycia Murguia NP    No current facility-administered medications on file prior to encounter. Current Outpatient Medications on File Prior to Encounter   Medication Sig Dispense Refill    ondansetron (ZOFRAN ODT) 4 mg disintegrating tablet Take 1 Tablet by mouth every eight (8) hours as needed for Nausea or Vomiting. 20 Tablet 0    diphenoxylate-atropine (LomotiL) 2.5-0.025 mg per tablet Take 1 Tablet by mouth four (4) times daily as needed for Diarrhea (Take after each loose stool. ). Max Daily Amount: 4 Tablets. 20 Tablet 0    norgestimate-ethinyl estradioL (ORTHO TRI-CYCLEN, TRI-SPRINTEC) 0.18/0.215/0.25 mg-35 mcg (28) tab Take 1 Tablet by mouth daily.  84 Tablet 3    lidocaine (Lidoderm) 5 % Apply patch to the affected area for 12 hours a day and remove for 12 hours a day. 10 Each 0    Biktarvy tab tablet Take 1 Tablet by mouth daily. ibuprofen (MOTRIN) 800 mg tablet Take 1 Tablet by mouth every six (6) hours as needed for Pain. 25 Tablet 0       Review of Systems   Review of Systems   Constitutional:  Negative for activity change, chills, diaphoresis, fatigue and fever. HENT:  Negative for ear pain, sinus pressure, sore throat, trouble swallowing and voice change. Eyes:  Negative for photophobia, pain and visual disturbance. Respiratory:  Negative for cough, shortness of breath and wheezing. Cardiovascular:  Negative for chest pain and palpitations. Gastrointestinal:  Negative for constipation, diarrhea, nausea and vomiting. Endocrine: Negative. Genitourinary:  Negative for decreased urine volume, difficulty urinating, dysuria, frequency and urgency. Musculoskeletal:  Negative for arthralgias, back pain, myalgias, neck pain and neck stiffness. Skin:  Positive for wound (right jaw). Negative for color change and pallor. Allergic/Immunologic: Negative. Neurological:  Negative for dizziness, syncope, weakness and headaches. Hematological: Negative. Psychiatric/Behavioral:  Negative for agitation and behavioral problems. Physical Exam   Physical Exam  Vitals and nursing note reviewed. Constitutional:       Appearance: Normal appearance. She is normal weight. HENT:      Head: Normocephalic and atraumatic. Comments: 4 puncture wounds located on lower jaw. Nose: Nose normal.   Eyes:      Extraocular Movements: Extraocular movements intact. Pupils: Pupils are equal, round, and reactive to light. Cardiovascular:      Rate and Rhythm: Normal rate and regular rhythm. Pulmonary:      Effort: Pulmonary effort is normal. No respiratory distress. Breath sounds: Normal breath sounds. Chest:      Chest wall: No tenderness. Abdominal:      General: Abdomen is flat.    Musculoskeletal: General: Normal range of motion. Cervical back: Normal range of motion and neck supple. Skin:     General: Skin is warm and dry. Neurological:      General: No focal deficit present. Mental Status: She is alert and oriented to person, place, and time. Mental status is at baseline. Psychiatric:         Mood and Affect: Mood normal.         Behavior: Behavior normal.         Thought Content: Thought content normal.       Lab and Diagnostic Study Results   Labs -   No results found for this or any previous visit (from the past 12 hour(s)). Radiologic Studies -   @lastxrresult@  CT Results  (Last 48 hours)      None          CXR Results  (Last 48 hours)      None            Medical Decision Making and ED Course   Differential Diagnosis & Medical Decision Making Provider Note:   Patient presents after dog bite. Approximately 4 puncture wounds to right lower jaw. Rabies vaccine was given, rabies immunoglobulin was put in puncture wounds. First dose of Augmentin was given here, will send prescription. Discussed with patient strict follow-up for additional vaccines. - I am the first provider for this patient. I reviewed the vital signs, available nursing notes, past medical history, past surgical history, family history and social history. The patients presenting problems have been discussed, and they are in agreement with the care plan formulated and outlined with them. I have encouraged them to ask questions as they arise throughout their visit. Vital Signs-Reviewed the patient's vital signs. Patient Vitals for the past 12 hrs:   Temp Pulse Resp BP SpO2   01/09/23 1916 98.9 °F (37.2 °C) 72 19 106/66 100 %       ED Course:        Procedures   Performed by: Jorje Faria PA-C  Procedures      Disposition   Disposition: DC- Adult Discharges: All of the diagnostic tests were reviewed and questions answered. Diagnosis, care plan and treatment options were discussed.   The patient understands the instructions and will follow up as directed. The patients results have been reviewed with them. They have been counseled regarding their diagnosis. The patient verbally convey understanding and agreement of the signs, symptoms, diagnosis, treatment and prognosis and additionally agrees to follow up as recommended with their PCP in 24 - 48 hours. They also agree with the care-plan and convey that all of their questions have been answered. I have also put together some discharge instructions for them that include: 1) educational information regarding their diagnosis, 2) how to care for their diagnosis at home, as well a 3) list of reasons why they would want to return to the ED prior to their follow-up appointment, should their condition change. DISCHARGE PLAN:  1. Current Discharge Medication List        CONTINUE these medications which have NOT CHANGED    Details   ondansetron (ZOFRAN ODT) 4 mg disintegrating tablet Take 1 Tablet by mouth every eight (8) hours as needed for Nausea or Vomiting. Qty: 20 Tablet, Refills: 0      diphenoxylate-atropine (LomotiL) 2.5-0.025 mg per tablet Take 1 Tablet by mouth four (4) times daily as needed for Diarrhea (Take after each loose stool. ). Max Daily Amount: 4 Tablets. Qty: 20 Tablet, Refills: 0    Associated Diagnoses: Gastroenteritis, acute      norgestimate-ethinyl estradioL (ORTHO TRI-CYCLEN, TRI-SPRINTEC) 0.18/0.215/0.25 mg-35 mcg (28) tab Take 1 Tablet by mouth daily. Qty: 84 Tablet, Refills: 3      lidocaine (Lidoderm) 5 % Apply patch to the affected area for 12 hours a day and remove for 12 hours a day. Qty: 10 Each, Refills: 0      Biktarvy tab tablet Take 1 Tablet by mouth daily. ibuprofen (MOTRIN) 800 mg tablet Take 1 Tablet by mouth every six (6) hours as needed for Pain. Qty: 25 Tablet, Refills: 0           2.    Follow-up Information       Follow up With Specialties Details Why Contact Info    Rosario Gonzalez NP Nurse Practitioner       [facility] Emergency Medicine   [address] [place] [phone]  [phone]          3. Return to ED if worse   4. Current Discharge Medication List        START taking these medications    Details   amoxicillin-clavulanate (Augmentin) 875-125 mg per tablet Take 1 Tablet by mouth two (2) times a day. Qty: 14 Tablet, Refills: 0  Start date: 1/9/2023            Remove if admitted/transferred    Diagnosis/Clinical Impression     Clinical Impression:   1. Animal bite        Attestations: Pantera Lee PA-C, enedelia the primary clinician of record. Please note that this dictation was completed with [employer], the [employer] voice recognition software. Quite often unanticipated grammatical, syntax, homophones, and other interpretive errors are inadvertently transcribed by the computer software. Please disregard these errors. Please excuse any errors that have escaped final proofreading. Thank you.

## 2023-01-10 NOTE — ED TRIAGE NOTES
Pt reports dog bite to face around 1500 today. 3 puncture sites noted to right cheek/jaw line. Pt does not know rabies status of dog. [place] [facility] to be notified.

## 2023-02-06 ENCOUNTER — TELEPHONE (OUTPATIENT)
Dept: OBGYN CLINIC | Age: 23
End: 2023-02-06

## 2023-02-20 ENCOUNTER — OFFICE VISIT (OUTPATIENT)
Dept: OBGYN CLINIC | Age: 23
End: 2023-02-20
Payer: MEDICAID

## 2023-02-20 VITALS — DIASTOLIC BLOOD PRESSURE: 73 MMHG | WEIGHT: 113.2 LBS | SYSTOLIC BLOOD PRESSURE: 118 MMHG | BODY MASS INDEX: 17.73 KG/M2

## 2023-02-20 DIAGNOSIS — R87.612 PAPANICOLAOU SMEAR OF CERVIX WITH LOW GRADE SQUAMOUS INTRAEPITHELIAL LESION (LGSIL): Primary | ICD-10-CM

## 2023-02-20 NOTE — PROGRESS NOTES
Subjective:  Chief Complaints:        1. Recent abnormal pap smear. 2. Here for Colposcopy.:    HPI:         Maria Fernanda Rucker is a 21 y.o. female who came in today for colposcopy after abnormal pap smear findings. Patient is doing well today and has no complaints. ROS:  RESPIRATORY:     Negative for shortness of breath, chest pain, chest congestion, cough. CARDIOLOGY:    Negative for dizziness, chest pain, palpitations. FEMALE REPRODUCTIVE:    Negative for abnormal vaginal discharge, abnormal vaginal bleeding. UROLOGY:    Negative for difficulty urinating, voiding dysfunction, frequent urination, dysuria. Gyn History:    OB History:  OB History    Para Term  AB Living   1       1     SAB IAB Ectopic Molar Multiple Live Births     1              # Outcome Date GA Lbr Alejandro/2nd Weight Sex Delivery Anes PTL Lv   1 IAB                Current Outpatient Medications   Medication Sig    diphenoxylate-atropine (LomotiL) 2.5-0.025 mg per tablet Take 1 Tablet by mouth four (4) times daily as needed for Diarrhea (Take after each loose stool. ). Max Daily Amount: 4 Tablets. norgestimate-ethinyl estradioL (ORTHO TRI-CYCLEN, TRI-SPRINTEC) 0.18/0.215/0.25 mg-35 mcg (28) tab Take 1 Tablet by mouth daily. lidocaine (Lidoderm) 5 % Apply patch to the affected area for 12 hours a day and remove for 12 hours a day. Biktarvy tab tablet Take 1 Tablet by mouth daily. No current facility-administered medications for this visit. Objective:  Physical Examination:  GENERAL:     General Appearance: well-appearing, well-developed, no acute distress. Hygiene: unremarkable. Mental Status:  alert and oriented. Mood/Affect:  pleasant. Speech: unremarkable. HEART:     Rhythm:  regular. Rate:  normal.  LUNGS:     Auscultation:  CTA bilaterally, no wheezing/rhonchi/rales. ABDOMEN:       Guarding:  none. Tenderness:  none. Masses:  none palpable. Inguinal nodes:  not enlarged. Ascites:  none. Bowel sounds:  normoactive. Distention:  none. Rebound tenderness:  none. RECTUM:     Anus:  no fissures, unremarkable. GENITOURINARY - FEMALE:     Vagina:  pink/moist mucosa, no gross evidence of lesions, no abnormal discharge. Cervix/cuff: normal appearance, no lesions/discharge/bleeding:  Colposcopy performed: see Procedure. External genitalia: normal.    Assessment:  The encounter diagnosis was Papanicolaou smear of cervix with low grade squamous intraepithelial lesion (LGSIL). Plan:  Pap smear results reviewed with pt. Colposcopy done today. Discussed importance of strict followup and to avoid smoking: Depending on severity, followup may be every 4-6 months or sooner if higher grade and may need immediate treatment. Goal is to prevent progression to Cervical Cancer. Discussed immunes system boosters such as adequate sleep; daily multivitamins, diet rich in wholesome nutritional foods and antioxidants and safe sex practices. Patient expressed understanding; All questions answered. Procedures:  Colposcopy:  Informed consent Risk, complications, benefits and alternatives discussed with patient, Consent obtained, 30 second time out taken. Pregnancy status negative. Negative Pregnancy Test.  Colposcopy procedure Acetic Acid 4-6% solution applied to cervix, Endocervical Curreting was performed, Biopsy(ies) taken at 11 o'clock. ,Monsels paste applied to biopsy site(S),. Post Colposcopy Hemostasis was assured, Patient tolerated the procedure well, Instructed nothing in vagina for 4-6 days, Stressed importance of strict followup, Discussed importance of NOT SMOKING. Gardasil discussed in patients under 32years old, discussed that Gardasil (HPV Vaccine) can still offer protection against up to 70% of HPV types tht cause wqrts or cervical cancer. No orders of the defined types were placed in this encounter. Preventive:  If < 32years old, discussed HPV/Cervical CA prevention;  Implications of Gardasil Vaccine and May prevent infection of HPV types that cause 70% of warts or cervical dysplasias, however, Yearly PAPs with HPV DNA testing is still necessary. This can be inquired at [facility] Dept.; Discussed need to quit smoking if smoker. Discussed importance of strict followup PAPs and colposcopies as recommended.     Follow Up: Biopsy result

## 2023-02-22 LAB
CPT BILLING CODE: ABNORMAL
DIAGNOSIS SYNOPSIS:: ABNORMAL
DX ICD CODE: ABNORMAL
DX ICD CODE: ABNORMAL
PATH REPORT.FINAL DX SPEC: ABNORMAL
PATH REPORT.GROSS SPEC: ABNORMAL
PATH REPORT.RELEVANT HX SPEC: ABNORMAL
PATH REPORT.SITE OF ORIGIN SPEC: ABNORMAL
PATHOLOGIST NAME: ABNORMAL
PAYMENT PROCEDURE: ABNORMAL

## 2023-04-06 ENCOUNTER — APPOINTMENT (OUTPATIENT)
Dept: CT IMAGING | Age: 23
End: 2023-04-06
Attending: FAMILY MEDICINE
Payer: MEDICAID

## 2023-04-06 ENCOUNTER — HOSPITAL ENCOUNTER (OUTPATIENT)
Age: 23
End: 2023-04-06
Attending: FAMILY MEDICINE
Payer: MEDICAID

## 2023-04-06 PROCEDURE — 74177 CT ABD & PELVIS W/CONTRAST: CPT

## 2023-04-06 PROCEDURE — 74011250636 HC RX REV CODE- 250/636: Performed by: FAMILY MEDICINE

## 2023-04-06 PROCEDURE — 74011000636 HC RX REV CODE- 636: Performed by: FAMILY MEDICINE

## 2023-04-06 RX ADMIN — KETOROLAC TROMETHAMINE 15 MG: 15 INJECTION, SOLUTION INTRAMUSCULAR; INTRAVENOUS at 12:20

## 2023-04-06 RX ADMIN — IOPAMIDOL 100 ML: 755 INJECTION, SOLUTION INTRAVENOUS at 13:39

## 2023-04-06 NOTE — DISCHARGE INSTRUCTIONS
Thank you! Thank you for allowing me to care for you in the emergency department. It is my goal to provide you with excellent care. If you have not received excellent quality care, please ask to speak to the nurse manager. Please fill out the survey that will come to you by mail or email since we listen to your feedback! Below you will find a list of your tests from today's visit. Should you have any questions, please do not hesitate to call the emergency department. Labs  Recent Results (from the past 12 hour(s))   CBC WITH AUTOMATED DIFF    Collection Time: 04/06/23 12:15 PM   Result Value Ref Range    WBC 3.8 3.6 - 11.0 K/uL    RBC 3.69 (L) 3.80 - 5.20 M/uL    HGB 12.6 11.5 - 16.0 g/dL    HCT 37.7 35.0 - 47.0 %    .2 (H) 80.0 - 99.0 FL    MCH 34.1 (H) 26.0 - 34.0 PG    MCHC 33.4 30.0 - 36.5 g/dL    RDW 12.1 11.5 - 14.5 %    PLATELET 917 354 - 792 K/uL    MPV 9.6 8.9 - 12.9 FL    NEUTROPHILS 40 32 - 75 %    LYMPHOCYTES 52 (H) 12 - 49 %    MONOCYTES 4 (L) 5 - 13 %    EOSINOPHILS 3 0 - 7 %    BASOPHILS 1 0 - 1 %    IMMATURE GRANULOCYTES 0 0.0 - 0.5 %    ABS. NEUTROPHILS 1.5 (L) 1.8 - 8.0 K/UL    ABS. LYMPHOCYTES 2.0 0.8 - 3.5 K/UL    ABS. MONOCYTES 0.2 0.0 - 1.0 K/UL    ABS. EOSINOPHILS 0.1 0.0 - 0.4 K/UL    ABS. BASOPHILS 0.0 0.0 - 0.1 K/UL    ABS. IMM. GRANS. 0.0 0.00 - 0.04 K/UL    DF AUTOMATED     METABOLIC PANEL, COMPREHENSIVE    Collection Time: 04/06/23 12:15 PM   Result Value Ref Range    Sodium 138 136 - 145 mmol/L    Potassium 4.2 3.5 - 5.1 mmol/L    Chloride 102 97 - 108 mmol/L    CO2 27 21 - 32 mmol/L    Anion gap 9 5 - 15 mmol/L    Glucose 82 65 - 100 mg/dL    BUN 12 6 - 20 mg/dL    Creatinine 0.87 0.55 - 1.02 mg/dL    BUN/Creatinine ratio 14 12 - 20      eGFR >60 >60 ml/min/1.73m2    Calcium 9.3 8.5 - 10.1 mg/dL    Bilirubin, total 0.5 0.2 - 1.0 mg/dL    AST (SGOT) 18 15 - 37 U/L    ALT (SGPT) 15 12 - 78 U/L    Alk.  phosphatase 43 (L) 45 - 117 U/L    Protein, total 7.8 6.4 - 8.2 g/dL Albumin 3.7 3.5 - 5.0 g/dL    Globulin 4.1 (H) 2.0 - 4.0 g/dL    A-G Ratio 0.9 (L) 1.1 - 2.2     LIPASE    Collection Time: 04/06/23 12:15 PM   Result Value Ref Range    Lipase 65 (L) 73 - 393 U/L   URINALYSIS W/ REFLEX CULTURE    Collection Time: 04/06/23 12:15 PM    Specimen: Urine   Result Value Ref Range    Color Yellow/Straw      Appearance Clear Clear      Specific gravity 1.015 1.003 - 1.030      pH (UA) 5.0 5.0 - 8.0      Protein Negative Negative mg/dL    Glucose Negative Negative mg/dL    Ketone Negative Negative mg/dL    Bilirubin Negative Negative      Blood Negative Negative      Urobilinogen 0.1 (L) 0.2 - 1.0 EU/dL    Nitrites Negative Negative      Leukocyte Esterase Negative Negative      WBC 0-4 0 - 4 /hpf    RBC 0-5 0 - 5 /hpf    Epithelial cells Few Few /lpf    Bacteria Negative Negative /hpf    UA:UC IF INDICATED Culture not indicated by UA result Culture not indicated by UA result         Radiologic Studies  CT ABD PELV W CONT   Final Result   1. No acute abdominal or pelvic abnormality is confirmed. CT Results  (Last 48 hours)                 04/06/23 1340  CT ABD PELV W CONT Final result    Impression:  1. No acute abdominal or pelvic abnormality is confirmed. Narrative:  INDICATION:   1 week left-sided abdominal pain       EXAM:   CT ABDOMEN WITH CONTRAST. CT PELVIS WITH CONTRAST. POST-PROCESSING WAS   PERFORMED. Sequential axial images of the  abdomen and pelvis were performed   following intravenous and oral contrast. Soft tissue, lung and bone windows were   examined. Post-processing was performed for coronal and sagittal reformatting. CT dose reduction was achieved through use of a standardized protocol tailored   for this examination and automatic exposure control for dose modulation. COMPARISON: 11/21/2022. CONTRAST:  100 cc's Isovue 370. FINDINGS:       ABDOMEN: The lung bases are clear. Liver and spleen parenchyma are homogeneous.    The pancreas and adrenal glands are normal. The kidneys excrete contrast   symmetrically bilaterally. Bowel loops are nondilated and there is no ascites. PELVIS:  Additional evaluation of the pelvis reveals no abnormal mass or fluid   collection. The urinary bladder is normally distended. The distal ureters are   normal. Small free fluid in the pelvis, likely physiologic. The uterus is   retroverted. The appendix is normal.                 CXR Results  (Last 48 hours)      None          ------------------------------------------------------------------------------------------------------------  The exam and treatment you received in the Emergency Department were for an urgent problem and are not intended as complete care. It is important that you follow-up with a doctor, nurse practitioner, or physician assistant to:  (1) confirm your diagnosis,  (2) re-evaluation of changes in your illness and treatment, and  (3) for ongoing care. Please take your discharge instructions with you when you go to your follow-up appointment. If you have any problem arranging a follow-up appointment, contact the Emergency Department. If your symptoms become worse or you do not improve as expected and you are unable to reach your health care provider, please return to the Emergency Department. We are available 24 hours a day. If a prescription has been provided, please have it filled as soon as possible to prevent a delay in treatment. If you have any questions or reservations about taking the medication due to side effects or interactions with other medications, please call your primary care provider or contact the ER.

## 2023-04-06 NOTE — ED TRIAGE NOTES
22 YO F p/w 7 days worsening L sided abdominal pain w/ intermittent nausea Patient Education   Patient Education        Gastroesophageal Reflux Disease (GERD): Care Instructions  Your Care Instructions    Gastroesophageal reflux disease (GERD) is the backward flow of stomach acid into the esophagus. The esophagus is the tube that leads from your throat to your stomach. A one-way valve prevents the stomach acid from moving up into this tube. When you have GERD, this valve does not close tightly enough. If you have mild GERD symptoms including heartburn, you may be able to control the problem with antacids or over-the-counter medicine. Changing your diet, losing weight, and making other lifestyle changes can also help reduce symptoms. Follow-up care is a key part of your treatment and safety. Be sure to make and go to all appointments, and call your doctor if you are having problems. Its also a good idea to know your test results and keep a list of the medicines you take. How can you care for yourself at home? · Take your medicines exactly as prescribed. Call your doctor if you think you are having a problem with your medicine. · Your doctor may recommend over-the-counter medicine. For mild or occasional indigestion, antacids, such as Tums, Gaviscon, Mylanta, or Maalox, may help. Your doctor also may recommend over-the-counter acid reducers, such as Pepcid AC, Tagamet HB, Zantac 75, or Prilosec. Read and follow all instructions on the label. If you use these medicines often, talk with your doctor. · Change your eating habits. ¨ Its best to eat several small meals instead of two or three large meals. ¨ After you eat, wait 2 to 3 hours before you lie down. ¨ Chocolate, mint, and alcohol can make GERD worse. ¨ Spicy foods, foods that have a lot of acid (like tomatoes and oranges), and coffee can make GERD symptoms worse in some people. If your symptoms are worse after you eat a certain food, you may want to stop eating that food to see if your symptoms get better.   · Do not smoke or acetaminophen, butalbital, and caffeine is safe for you, tell your doctor if you have:  · liver disease, cirrhosis, a history of alcoholism or drug addiction, or if you drink more than 3 alcoholic beverages per day;  · kidney disease;  · asthma, sleep apnea, or other breathing disorder;  · stomach ulcer or bleeding;  · a history of skin rash caused by any medication;  · a history of mental illness or suicidal thoughts; or  · if you use medicine to prevent blood clots. It is not known whether this medicine will harm an unborn baby. If you use butalbital while you are pregnant, your baby could become dependent on the drug. This can cause life-threatening withdrawal symptoms in the baby after it is born. Babies born dependent on habit-forming medicine may need medical treatment for several weeks. Tell your doctor if you are pregnant or plan to become pregnant. This medicine can pass into breast milk and may harm a nursing baby. Tell your doctor if you are breast-feeding a baby. How should I take acetaminophen, butalbital, and caffeine? Follow all directions on your prescription label. Do not take more of this medication than recommended. An overdose can damage your liver or cause death. Tell your doctor if the medicine seems to stop working as well in relieving your pain. Butalbital may be habit-forming. Never share this medicine with another person, especially someone with a history of drug abuse or addiction. Keep the medication in a place where others cannot get to it. Selling or giving away this medicine is against the law. Take the medicine with food or milk if it upsets your stomach. Store at room temperature away from moisture and heat. Keep track of the amount of medicine used from each new bottle. Butalbital is a drug of abuse and you should be aware if anyone is using your medicine improperly or without a prescription. What happens if I miss a dose?   Since this medicine is used when needed, you may not be on a dosing schedule. If you are on a schedule, use the missed dose as soon as you remember. Skip the missed dose if it is almost time for your next scheduled dose. Do not use extra medicine to make up the missed dose. What happens if I overdose? Seek emergency medical attention or call the Poison Help line at 1-465.328.7568. An overdose of acetaminophen, butalbital, and caffeine can be fatal.  The first signs of an acetaminophen overdose include loss of appetite, nausea, vomiting, stomach pain, sweating, and confusion or weakness. Later symptoms may include pain in your upper stomach, dark urine, and yellowing of your skin or the whites of your eyes. Overdose symptoms may also include insomnia, restlessness, tremor, diarrhea, increased shallow breathing, uneven heartbeats, seizure (convulsions), or fainting. What should I avoid while taking acetaminophen, butalbital, and caffeine? This medication can cause side effects that may impair your thinking or reactions. Be careful if you drive or do anything that requires you to be awake and alert. Avoid drinking alcohol. It may increase your risk of liver damage while taking acetaminophen. Ask a doctor or pharmacist before using any other cold, allergy, pain, or sleep medication. Acetaminophen (sometimes abbreviated as APAP) is contained in many combination medicines. Taking certain products together can cause you to get too much acetaminophen which can lead to a fatal overdose. Check the label to see if a medicine contains acetaminophen or APAP. While you are taking this medication, avoid taking diet pills, caffeine pills, or other stimulants (such as ADHD medications) without your doctor's advice. What are the possible side effects of acetaminophen, butalbital, and caffeine? Get emergency medical help if you have signs of an allergic reaction:  hives; difficulty breathing; swelling of your face, lips, tongue, or throat.   In rare cases, acetaminophen may cause a severe skin reaction that can be fatal. This could occur even if you have taken acetaminophen in the past and had no reaction. Stop taking this medicine and call your doctor right away if you have skin redness or a rash that spreads and causes blistering and peeling. If you have this type of reaction, you should never again take any medicine that contains acetaminophen. Stop using this medicine and call your doctor at once if you have:  · confusion, seizure (convulsions);  · shortness of breath;  · a light-headed feeling, like you might pass out; or  · nausea, upper stomach pain, itching, loss of appetite, dark urine, tila-colored stools, jaundice (yellowing of the skin or eyes). Common side effects may include:  · drowsiness, dizziness;  · feeling anxious or restless;  · drunk feeling; or  · sleep problems (insomnia). This is not a complete list of side effects and others may occur. Call your doctor for medical advice about side effects. You may report side effects to FDA at 4-433-FDA-1922. What other drugs will affect acetaminophen, butalbital, and caffeine? Taking this medicine with other drugs that make you sleepy or slow your breathing can cause dangerous or life-threatening side effects. Ask your doctor before taking acetaminophen, butalbital, and caffeine with a sleeping pill, narcotic pain medicine, muscle relaxer, or medicine for anxiety, depression, or seizures. Other drugs may interact with acetaminophen, butalbital, and caffeine, including prescription and over-the-counter medicines, vitamins, and herbal products. Tell each of your health care providers about all medicines you use now and any medicine you start or stop using. Where can I get more information? Your pharmacist can provide more information about acetaminophen, butalbital, and caffeine.     Remember, keep this and all other medicines out of the reach of children, never share your medicines with others, and use this medication only for the indication prescribed. Every effort has been made to ensure that the information provided by [facility] [address] is accurate, up-to-date, and complete, but no guarantee is made to that effect. Drug information contained herein may be time sensitive. [facility] information has been compiled for use by healthcare practitioners and consumers in the [place] and therefore [facility] does not warrant that uses outside of the [place] are appropriate, unless specifically indicated otherwise. [facility]'s drug information does not endorse drugs, diagnose patients or recommend therapy. [facility]'s drug information is an informational resource designed to assist licensed healthcare practitioners in caring for their patients and/or to serve consumers viewing this service as a supplement to, and not a substitute for, the expertise, skill, knowledge and judgment of healthcare practitioners. The absence of a warning for a given drug or drug combination in no way should be construed to indicate that the drug or drug combination is safe, effective or appropriate for any given patient. [facility] does not assume any responsibility for any aspect of healthcare administered with the aid of information [facility] provides. The information contained herein is not intended to cover all possible uses, directions, precautions, warnings, drug interactions, allergic reactions, or adverse effects. If you have questions about the drugs you are taking, check with your doctor, nurse or pharmacist.  Copyright 0848-5719 [address]. Version: 6.02. Revision date: 1/25/2016. Care instructions adapted under license by [facility] ([facility]). If you have questions about a medical condition or this instruction, always ask your healthcare professional. [place] [address] any warranty or liability for your use of this information.

## 2023-04-06 NOTE — Clinical Note
[facility] EMERGENCY DEPARTMENT  [address] [facility] [address]  [phone]    Work/School Note    Date: 4/6/2023    To Whom It May concern:      Polo Shea was seen and treated today in the emergency room by the following provider(s):  Attending Provider: Al Martinez is excused from work/school on 04/06/23. She is clear to return to work/school on 04/07/23.         Sincerely,          Espiridion Sandifer, DO

## 2023-05-03 ENCOUNTER — HOSPITAL ENCOUNTER (EMERGENCY)
Age: 23
Discharge: HOME | End: 2023-05-03
Attending: STUDENT IN AN ORGANIZED HEALTH CARE EDUCATION/TRAINING PROGRAM
Payer: MEDICAID

## 2023-05-03 VITALS
TEMPERATURE: 97.7 F | HEIGHT: 67 IN | WEIGHT: 115 LBS | SYSTOLIC BLOOD PRESSURE: 147 MMHG | DIASTOLIC BLOOD PRESSURE: 96 MMHG | HEART RATE: 54 BPM | RESPIRATION RATE: 22 BRPM | BODY MASS INDEX: 18.05 KG/M2 | OXYGEN SATURATION: 98 %

## 2023-05-03 DIAGNOSIS — L02.91 ABSCESS: ICD-10-CM

## 2023-05-03 DIAGNOSIS — K29.90 GASTRITIS AND DUODENITIS: Primary | ICD-10-CM

## 2023-05-03 LAB
ALBUMIN SERPL-MCNC: 4.3 G/DL (ref 3.5–5)
ALBUMIN/GLOB SERPL: 1.1 (ref 1.1–2.2)
ALP SERPL-CCNC: 63 U/L (ref 45–117)
ALT SERPL-CCNC: 21 U/L (ref 12–78)
ANION GAP SERPL CALC-SCNC: 13 MMOL/L (ref 5–15)
AST SERPL W P-5'-P-CCNC: 25 U/L (ref 15–37)
ATRIAL RATE: 67 BPM
BASOPHILS # BLD: 0 K/UL (ref 0–0.1)
BASOPHILS NFR BLD: 0 % (ref 0–1)
BILIRUB SERPL-MCNC: 0.6 MG/DL (ref 0.2–1)
BUN SERPL-MCNC: 12 MG/DL (ref 6–20)
BUN/CREAT SERPL: 11 (ref 12–20)
CA-I BLD-MCNC: 9.7 MG/DL (ref 8.5–10.1)
CALCULATED P AXIS, ECG09: 6 DEGREES
CALCULATED R AXIS, ECG10: 76 DEGREES
CALCULATED T AXIS, ECG11: 44 DEGREES
CHLORIDE SERPL-SCNC: 104 MMOL/L (ref 97–108)
CO2 SERPL-SCNC: 22 MMOL/L (ref 21–32)
CREAT SERPL-MCNC: 1.05 MG/DL (ref 0.55–1.02)
DIAGNOSIS, 93000: NORMAL
DIFFERENTIAL METHOD BLD: ABNORMAL
EOSINOPHIL # BLD: 0.2 K/UL (ref 0–0.4)
EOSINOPHIL NFR BLD: 2 % (ref 0–7)
ERYTHROCYTE [DISTWIDTH] IN BLOOD BY AUTOMATED COUNT: 12.2 % (ref 11.5–14.5)
GLOBULIN SER CALC-MCNC: 3.9 G/DL (ref 2–4)
GLUCOSE SERPL-MCNC: 111 MG/DL (ref 65–100)
HCT VFR BLD AUTO: 43.1 % (ref 35–47)
HGB BLD-MCNC: 14.5 G/DL (ref 11.5–16)
IMM GRANULOCYTES # BLD AUTO: 0 K/UL (ref 0–0.04)
IMM GRANULOCYTES NFR BLD AUTO: 0 % (ref 0–0.5)
LIPASE SERPL-CCNC: 98 U/L (ref 73–393)
LYMPHOCYTES # BLD: 2.3 K/UL (ref 0.8–3.5)
LYMPHOCYTES NFR BLD: 26 % (ref 12–49)
MCH RBC QN AUTO: 34.2 PG (ref 26–34)
MCHC RBC AUTO-ENTMCNC: 33.6 G/DL (ref 30–36.5)
MCV RBC AUTO: 101.7 FL (ref 80–99)
MONOCYTES # BLD: 0.3 K/UL (ref 0–1)
MONOCYTES NFR BLD: 4 % (ref 5–13)
NEUTS SEG # BLD: 5.8 K/UL (ref 1.8–8)
NEUTS SEG NFR BLD: 68 % (ref 32–75)
P-R INTERVAL, ECG05: 152 MS
PLATELET # BLD AUTO: 228 K/UL (ref 150–400)
PMV BLD AUTO: 9.5 FL (ref 8.9–12.9)
POTASSIUM SERPL-SCNC: 3.7 MMOL/L (ref 3.5–5.1)
PROT SERPL-MCNC: 8.2 G/DL (ref 6.4–8.2)
Q-T INTERVAL, ECG07: 400 MS
QRS DURATION, ECG06: 70 MS
QTC CALCULATION (BEZET), ECG08: 422 MS
RBC # BLD AUTO: 4.24 M/UL (ref 3.8–5.2)
SODIUM SERPL-SCNC: 139 MMOL/L (ref 136–145)
TROPONIN I SERPL HS-MCNC: <4 NG/L (ref 0–51)
VENTRICULAR RATE, ECG03: 67 BPM
WBC # BLD AUTO: 8.7 K/UL (ref 3.6–11)

## 2023-05-03 PROCEDURE — 93005 ELECTROCARDIOGRAM TRACING: CPT

## 2023-05-03 PROCEDURE — 99284 EMERGENCY DEPT VISIT MOD MDM: CPT

## 2023-05-03 PROCEDURE — 96375 TX/PRO/DX INJ NEW DRUG ADDON: CPT

## 2023-05-03 PROCEDURE — 85025 COMPLETE CBC W/AUTO DIFF WBC: CPT

## 2023-05-03 PROCEDURE — 84484 ASSAY OF TROPONIN QUANT: CPT

## 2023-05-03 PROCEDURE — 83690 ASSAY OF LIPASE: CPT

## 2023-05-03 PROCEDURE — 80053 COMPREHEN METABOLIC PANEL: CPT

## 2023-05-03 PROCEDURE — 96374 THER/PROPH/DIAG INJ IV PUSH: CPT

## 2023-05-03 PROCEDURE — 74011000250 HC RX REV CODE- 250: Performed by: STUDENT IN AN ORGANIZED HEALTH CARE EDUCATION/TRAINING PROGRAM

## 2023-05-03 PROCEDURE — 74011250636 HC RX REV CODE- 250/636: Performed by: STUDENT IN AN ORGANIZED HEALTH CARE EDUCATION/TRAINING PROGRAM

## 2023-05-03 PROCEDURE — 36415 COLL VENOUS BLD VENIPUNCTURE: CPT

## 2023-05-03 RX ORDER — ONDANSETRON 2 MG/ML
4 INJECTION INTRAMUSCULAR; INTRAVENOUS
Status: COMPLETED | OUTPATIENT
Start: 2023-05-03 | End: 2023-05-03

## 2023-05-03 RX ORDER — DOXYCYCLINE HYCLATE 100 MG
100 TABLET ORAL 2 TIMES DAILY
Qty: 14 TABLET | Refills: 0 | Status: SHIPPED | OUTPATIENT
Start: 2023-05-03 | End: 2023-05-10

## 2023-05-03 RX ORDER — FAMOTIDINE 20 MG/1
20 TABLET, FILM COATED ORAL 2 TIMES DAILY
Qty: 20 TABLET | Refills: 0 | Status: SHIPPED | OUTPATIENT
Start: 2023-05-03 | End: 2023-05-13

## 2023-05-03 RX ORDER — ONDANSETRON 4 MG/1
4 TABLET, FILM COATED ORAL
Qty: 10 TABLET | Refills: 0 | Status: SHIPPED | OUTPATIENT
Start: 2023-05-03

## 2023-05-03 RX ORDER — KETOROLAC TROMETHAMINE 15 MG/ML
15 INJECTION, SOLUTION INTRAMUSCULAR; INTRAVENOUS
Status: COMPLETED | OUTPATIENT
Start: 2023-05-03 | End: 2023-05-03

## 2023-05-03 RX ORDER — METOCLOPRAMIDE HYDROCHLORIDE 5 MG/ML
5 INJECTION INTRAMUSCULAR; INTRAVENOUS
Status: COMPLETED | OUTPATIENT
Start: 2023-05-03 | End: 2023-05-03

## 2023-05-03 RX ADMIN — SODIUM CHLORIDE 1000 ML: 9 INJECTION, SOLUTION INTRAVENOUS at 07:55

## 2023-05-03 RX ADMIN — ONDANSETRON 4 MG: 2 INJECTION INTRAMUSCULAR; INTRAVENOUS at 07:43

## 2023-05-03 RX ADMIN — SODIUM CHLORIDE, PRESERVATIVE FREE 20 MG: 5 INJECTION INTRAVENOUS at 09:29

## 2023-05-03 RX ADMIN — KETOROLAC TROMETHAMINE 15 MG: 15 INJECTION, SOLUTION INTRAMUSCULAR; INTRAVENOUS at 07:55

## 2023-05-03 RX ADMIN — METOCLOPRAMIDE 5 MG: 5 INJECTION, SOLUTION INTRAMUSCULAR; INTRAVENOUS at 09:12

## 2023-05-06 ENCOUNTER — HOSPITAL ENCOUNTER (EMERGENCY)
Facility: HOSPITAL | Age: 23
Discharge: HOME | End: 2023-05-06
Attending: EMERGENCY MEDICINE
Payer: MEDICAID

## 2023-05-06 ENCOUNTER — APPOINTMENT (OUTPATIENT)
Facility: HOSPITAL | Age: 23
End: 2023-05-06
Payer: MEDICAID

## 2023-05-06 VITALS
WEIGHT: 126 LBS | OXYGEN SATURATION: 98 % | DIASTOLIC BLOOD PRESSURE: 88 MMHG | TEMPERATURE: 98.4 F | HEART RATE: 64 BPM | SYSTOLIC BLOOD PRESSURE: 126 MMHG | BODY MASS INDEX: 20.99 KG/M2 | RESPIRATION RATE: 18 BRPM | HEIGHT: 65 IN

## 2023-05-06 DIAGNOSIS — N39.0 URINARY TRACT INFECTION WITHOUT HEMATURIA, SITE UNSPECIFIED: Primary | ICD-10-CM

## 2023-05-06 LAB
ALBUMIN SERPL-MCNC: 3.7 G/DL (ref 3.5–5)
ALBUMIN/GLOB SERPL: 0.8 (ref 1.1–2.2)
ALP SERPL-CCNC: 50 U/L (ref 45–117)
ALT SERPL-CCNC: 20 U/L (ref 12–78)
ANION GAP SERPL CALC-SCNC: 6 MMOL/L (ref 5–15)
APPEARANCE UR: ABNORMAL
AST SERPL W P-5'-P-CCNC: 32 U/L (ref 15–37)
BACTERIA URNS QL MICRO: NEGATIVE /HPF
BASOPHILS # BLD: 0 K/UL (ref 0–0.1)
BASOPHILS NFR BLD: 0 % (ref 0–1)
BILIRUB SERPL-MCNC: 0.7 MG/DL (ref 0.2–1)
BILIRUB UR QL: NEGATIVE
BUN SERPL-MCNC: 12 MG/DL (ref 6–20)
BUN/CREAT SERPL: 14 (ref 12–20)
CA-I BLD-MCNC: 9.5 MG/DL (ref 8.5–10.1)
CHLORIDE SERPL-SCNC: 108 MMOL/L (ref 97–108)
CO2 SERPL-SCNC: 18 MMOL/L (ref 21–32)
COLOR UR: ABNORMAL
CREAT SERPL-MCNC: 0.84 MG/DL (ref 0.55–1.02)
DIFFERENTIAL METHOD BLD: ABNORMAL
EOSINOPHIL # BLD: 0 K/UL (ref 0–0.4)
EOSINOPHIL NFR BLD: 0 % (ref 0–7)
EPITH CASTS URNS QL MICRO: ABNORMAL /LPF
ERYTHROCYTE [DISTWIDTH] IN BLOOD BY AUTOMATED COUNT: 11.8 % (ref 11.5–14.5)
GLOBULIN SER CALC-MCNC: 4.7 G/DL (ref 2–4)
GLUCOSE SERPL-MCNC: 100 MG/DL (ref 65–100)
GLUCOSE UR STRIP.AUTO-MCNC: NEGATIVE MG/DL
HCG UR QL: NEGATIVE
HCT VFR BLD AUTO: 39.3 % (ref 35–47)
HGB BLD-MCNC: 13.4 G/DL (ref 11.5–16)
HGB UR QL STRIP: ABNORMAL
IMM GRANULOCYTES # BLD AUTO: 0 K/UL (ref 0–0.04)
IMM GRANULOCYTES NFR BLD AUTO: 0 % (ref 0–0.5)
KETONES UR QL STRIP.AUTO: 80 MG/DL
LEUKOCYTE ESTERASE UR QL STRIP.AUTO: ABNORMAL
LIPASE SERPL-CCNC: 70 U/L (ref 73–393)
LYMPHOCYTES # BLD: 1 K/UL (ref 0.8–3.5)
LYMPHOCYTES NFR BLD: 19 % (ref 12–49)
MCH RBC QN AUTO: 33.8 PG (ref 26–34)
MCHC RBC AUTO-ENTMCNC: 34.1 G/DL (ref 30–36.5)
MCV RBC AUTO: 99.2 FL (ref 80–99)
MONOCYTES # BLD: 0.1 K/UL (ref 0–1)
MONOCYTES NFR BLD: 2 % (ref 5–13)
MUCOUS THREADS URNS QL MICRO: ABNORMAL /LPF
NEUTS SEG # BLD: 4.2 K/UL (ref 1.8–8)
NEUTS SEG NFR BLD: 79 % (ref 32–75)
NITRITE UR QL STRIP.AUTO: NEGATIVE
NRBC # BLD: 0 K/UL (ref 0–0.01)
NRBC BLD-RTO: 0 PER 100 WBC
PH UR STRIP: 6 (ref 5–8)
PLATELET # BLD AUTO: 246 K/UL (ref 150–400)
PMV BLD AUTO: 9.5 FL (ref 8.9–12.9)
POTASSIUM SERPL-SCNC: 4.6 MMOL/L (ref 3.5–5.1)
PROT SERPL-MCNC: 8.4 G/DL (ref 6.4–8.2)
PROT UR STRIP-MCNC: 100 MG/DL
RBC # BLD AUTO: 3.96 M/UL (ref 3.8–5.2)
RBC #/AREA URNS HPF: >100 /HPF (ref 0–5)
SODIUM SERPL-SCNC: 132 MMOL/L (ref 136–145)
SP GR UR REFRACTOMETRY: >1.03 (ref 1–1.03)
UROBILINOGEN UR QL STRIP.AUTO: 2 EU/DL (ref 0.1–1)
WBC # BLD AUTO: 5.4 K/UL (ref 3.6–11)
WBC URNS QL MICRO: >100 /HPF (ref 0–4)

## 2023-05-06 PROCEDURE — 2580000003 HC RX 258: Performed by: EMERGENCY MEDICINE

## 2023-05-06 PROCEDURE — 81025 URINE PREGNANCY TEST: CPT

## 2023-05-06 PROCEDURE — 85025 COMPLETE CBC W/AUTO DIFF WBC: CPT

## 2023-05-06 PROCEDURE — 36415 COLL VENOUS BLD VENIPUNCTURE: CPT

## 2023-05-06 PROCEDURE — 80053 COMPREHEN METABOLIC PANEL: CPT

## 2023-05-06 PROCEDURE — 83690 ASSAY OF LIPASE: CPT

## 2023-05-06 PROCEDURE — 93005 ELECTROCARDIOGRAM TRACING: CPT | Performed by: EMERGENCY MEDICINE

## 2023-05-06 PROCEDURE — 6360000002 HC RX W HCPCS: Performed by: EMERGENCY MEDICINE

## 2023-05-06 PROCEDURE — 99284 EMERGENCY DEPT VISIT MOD MDM: CPT

## 2023-05-06 PROCEDURE — 96361 HYDRATE IV INFUSION ADD-ON: CPT

## 2023-05-06 PROCEDURE — 71045 X-RAY EXAM CHEST 1 VIEW: CPT

## 2023-05-06 PROCEDURE — 96374 THER/PROPH/DIAG INJ IV PUSH: CPT

## 2023-05-06 PROCEDURE — 6370000000 HC RX 637 (ALT 250 FOR IP): Performed by: EMERGENCY MEDICINE

## 2023-05-06 PROCEDURE — 81001 URINALYSIS AUTO W/SCOPE: CPT

## 2023-05-06 RX ORDER — LIDOCAINE HYDROCHLORIDE 20 MG/ML
15 SOLUTION OROPHARYNGEAL
Status: COMPLETED | OUTPATIENT
Start: 2023-05-06 | End: 2023-05-06

## 2023-05-06 RX ORDER — CEPHALEXIN 500 MG/1
500 CAPSULE ORAL 3 TIMES DAILY
Qty: 14 CAPSULE | Refills: 0 | Status: SHIPPED | OUTPATIENT
Start: 2023-05-06 | End: 2023-05-11

## 2023-05-06 RX ORDER — MAGNESIUM HYDROXIDE/ALUMINUM HYDROXICE/SIMETHICONE 120; 1200; 1200 MG/30ML; MG/30ML; MG/30ML
30 SUSPENSION ORAL
Status: COMPLETED | OUTPATIENT
Start: 2023-05-06 | End: 2023-05-06

## 2023-05-06 RX ORDER — KETOROLAC TROMETHAMINE 15 MG/ML
15 INJECTION, SOLUTION INTRAMUSCULAR; INTRAVENOUS ONCE
Status: COMPLETED | OUTPATIENT
Start: 2023-05-06 | End: 2023-05-06

## 2023-05-06 RX ORDER — 0.9 % SODIUM CHLORIDE 0.9 %
1000 INTRAVENOUS SOLUTION INTRAVENOUS ONCE
Status: COMPLETED | OUTPATIENT
Start: 2023-05-06 | End: 2023-05-06

## 2023-05-06 RX ADMIN — Medication 15 ML: at 16:10

## 2023-05-06 RX ADMIN — SODIUM CHLORIDE 1000 ML: 9 INJECTION, SOLUTION INTRAVENOUS at 13:40

## 2023-05-06 RX ADMIN — KETOROLAC TROMETHAMINE 15 MG: 15 INJECTION, SOLUTION INTRAMUSCULAR; INTRAVENOUS at 17:17

## 2023-05-06 RX ADMIN — ALUMINUM HYDROXIDE, MAGNESIUM HYDROXIDE, AND SIMETHICONE 30 ML: 200; 200; 20 SUSPENSION ORAL at 16:10

## 2023-05-06 ASSESSMENT — PAIN SCALES - GENERAL
PAINLEVEL_OUTOF10: 6
PAINLEVEL_OUTOF10: 0
PAINLEVEL_OUTOF10: 8

## 2023-05-06 ASSESSMENT — PAIN DESCRIPTION - LOCATION: LOCATION: ABDOMEN

## 2023-05-06 ASSESSMENT — PAIN - FUNCTIONAL ASSESSMENT: PAIN_FUNCTIONAL_ASSESSMENT: NONE - DENIES PAIN

## 2023-05-06 NOTE — ED TRIAGE NOTES
Recent ER visit for vomiting and nausea on the third, started with nausea, vomiting again last night.  Hx of HIV, marijuana use

## 2023-05-06 NOTE — DISCHARGE INSTRUCTIONS
Phosphatase 50 45 - 117 U/L    Total Protein 8.4 (H) 6.4 - 8.2 g/dL    Albumin 3.7 3.5 - 5.0 g/dL    Globulin 4.7 (H) 2.0 - 4.0 g/dL    Albumin/Globulin Ratio 0.8 (L) 1.1 - 2.2     Lipase    Collection Time: 05/06/23 11:30 AM   Result Value Ref Range    Lipase 70 (L) 73 - 393 U/L   Urinalysis    Collection Time: 05/06/23  3:08 PM   Result Value Ref Range    Color, UA Red      Appearance Turbid (A) Clear      Specific Gravity, UA >1.030 (H) 1.003 - 1.030    pH, Urine 6.0 5.0 - 8.0      Protein,  (A) Negative mg/dL    Glucose, UA Negative Negative mg/dL    Ketones, Urine 80 (A) Negative mg/dL    Bilirubin Urine Negative Negative      Blood, Urine Large (A) Negative      Urobilinogen, Urine 2.0 (H) 0.1 - 1.0 EU/dL    Nitrite, Urine Negative Negative      Leukocyte Esterase, Urine Trace (A) Negative     Pregnancy, Urine    Collection Time: 05/06/23  3:08 PM   Result Value Ref Range    HCG(Urine) Pregnancy Test Negative Negative     Urinalysis, Micro    Collection Time: 05/06/23  3:08 PM   Result Value Ref Range    WBC, UA >100 (H) 0 - 4 /hpf    RBC, UA >100 (H) 0 - 5 /hpf    Epithelial Cells UA Many (A) Few /lpf    BACTERIA, URINE Negative Negative /hpf    Mucus, UA 4+ (A) Negative /lpf       Radiologic Studies  XR CHEST PORTABLE   Final Result      No acute process on portable chest.           ------------------------------------------------------------------------------------------------------------  The exam and treatment you received in the Emergency Department were for an urgent problem and are not intended as complete care. It is important that you follow-up with a doctor, nurse practitioner, or physician assistant to:  (1) confirm your diagnosis,  (2) re-evaluation of changes in your illness and treatment, and  (3) for ongoing care. Please take your discharge instructions with you when you go to your follow-up appointment.      If you have any problem arranging a follow-up appointment, contact the Emergency

## 2023-05-08 LAB
EKG ATRIAL RATE: 55 BPM
EKG DIAGNOSIS: NORMAL
EKG P AXIS: 44 DEGREES
EKG P-R INTERVAL: 140 MS
EKG Q-T INTERVAL: 438 MS
EKG QRS DURATION: 68 MS
EKG QTC CALCULATION (BAZETT): 419 MS
EKG R AXIS: 62 DEGREES
EKG T AXIS: 59 DEGREES
EKG VENTRICULAR RATE: 55 BPM

## 2023-05-08 PROCEDURE — 93010 ELECTROCARDIOGRAM REPORT: CPT | Performed by: INTERNAL MEDICINE

## 2023-05-16 ENCOUNTER — ANESTHESIA (OUTPATIENT)
Facility: HOSPITAL | Age: 23
End: 2023-05-16
Payer: MEDICAID

## 2023-05-16 ENCOUNTER — HOSPITAL ENCOUNTER (OUTPATIENT)
Facility: HOSPITAL | Age: 23
Discharge: HOME | End: 2023-05-16
Attending: INTERNAL MEDICINE | Admitting: INTERNAL MEDICINE
Payer: MEDICAID

## 2023-05-16 ENCOUNTER — ANESTHESIA EVENT (OUTPATIENT)
Facility: HOSPITAL | Age: 23
End: 2023-05-16
Payer: MEDICAID

## 2023-05-16 VITALS
HEART RATE: 67 BPM | TEMPERATURE: 98 F | BODY MASS INDEX: 17.27 KG/M2 | SYSTOLIC BLOOD PRESSURE: 105 MMHG | DIASTOLIC BLOOD PRESSURE: 56 MMHG | OXYGEN SATURATION: 100 % | HEIGHT: 67 IN | WEIGHT: 110 LBS | RESPIRATION RATE: 18 BRPM

## 2023-05-16 DIAGNOSIS — R11.2 NAUSEA AND VOMITING, UNSPECIFIED VOMITING TYPE: ICD-10-CM

## 2023-05-16 LAB — HCG UR QL: NEGATIVE

## 2023-05-16 PROCEDURE — 7100000011 HC PHASE II RECOVERY - ADDTL 15 MIN: Performed by: INTERNAL MEDICINE

## 2023-05-16 PROCEDURE — 2580000003 HC RX 258: Performed by: INTERNAL MEDICINE

## 2023-05-16 PROCEDURE — 88305 TISSUE EXAM BY PATHOLOGIST: CPT

## 2023-05-16 PROCEDURE — 3600007512: Performed by: INTERNAL MEDICINE

## 2023-05-16 PROCEDURE — 3700000000 HC ANESTHESIA ATTENDED CARE: Performed by: INTERNAL MEDICINE

## 2023-05-16 PROCEDURE — 81025 URINE PREGNANCY TEST: CPT

## 2023-05-16 PROCEDURE — 7100000010 HC PHASE II RECOVERY - FIRST 15 MIN: Performed by: INTERNAL MEDICINE

## 2023-05-16 PROCEDURE — 3600007502: Performed by: INTERNAL MEDICINE

## 2023-05-16 PROCEDURE — 2500000003 HC RX 250 WO HCPCS: Performed by: NURSE ANESTHETIST, CERTIFIED REGISTERED

## 2023-05-16 PROCEDURE — 2709999900 HC NON-CHARGEABLE SUPPLY: Performed by: INTERNAL MEDICINE

## 2023-05-16 PROCEDURE — 3700000001 HC ADD 15 MINUTES (ANESTHESIA): Performed by: INTERNAL MEDICINE

## 2023-05-16 PROCEDURE — 6360000002 HC RX W HCPCS: Performed by: NURSE ANESTHETIST, CERTIFIED REGISTERED

## 2023-05-16 RX ORDER — FENTANYL CITRATE 50 UG/ML
INJECTION, SOLUTION INTRAMUSCULAR; INTRAVENOUS PRN
Status: DISCONTINUED | OUTPATIENT
Start: 2023-05-16 | End: 2023-05-16 | Stop reason: SDUPTHER

## 2023-05-16 RX ORDER — LIDOCAINE HYDROCHLORIDE 20 MG/ML
INJECTION, SOLUTION EPIDURAL; INFILTRATION; INTRACAUDAL; PERINEURAL PRN
Status: DISCONTINUED | OUTPATIENT
Start: 2023-05-16 | End: 2023-05-16 | Stop reason: SDUPTHER

## 2023-05-16 RX ORDER — SODIUM CHLORIDE, SODIUM LACTATE, POTASSIUM CHLORIDE, CALCIUM CHLORIDE 600; 310; 30; 20 MG/100ML; MG/100ML; MG/100ML; MG/100ML
INJECTION, SOLUTION INTRAVENOUS CONTINUOUS
Status: DISCONTINUED | OUTPATIENT
Start: 2023-05-16 | End: 2023-05-16 | Stop reason: HOSPADM

## 2023-05-16 RX ORDER — PROPOFOL 10 MG/ML
INJECTION, EMULSION INTRAVENOUS PRN
Status: DISCONTINUED | OUTPATIENT
Start: 2023-05-16 | End: 2023-05-16 | Stop reason: SDUPTHER

## 2023-05-16 RX ADMIN — FENTANYL CITRATE 50 MCG: 50 INJECTION, SOLUTION INTRAMUSCULAR; INTRAVENOUS at 08:32

## 2023-05-16 RX ADMIN — SODIUM CHLORIDE, POTASSIUM CHLORIDE, SODIUM LACTATE AND CALCIUM CHLORIDE: 600; 310; 30; 20 INJECTION, SOLUTION INTRAVENOUS at 07:50

## 2023-05-16 RX ADMIN — PROPOFOL 60 MG: 10 INJECTION, EMULSION INTRAVENOUS at 08:34

## 2023-05-16 RX ADMIN — LIDOCAINE HYDROCHLORIDE 100 MG: 20 INJECTION, SOLUTION EPIDURAL; INFILTRATION; INTRACAUDAL; PERINEURAL at 08:32

## 2023-05-16 RX ADMIN — PROPOFOL 30 MG: 10 INJECTION, EMULSION INTRAVENOUS at 08:37

## 2023-05-16 RX ADMIN — PROPOFOL 20 MG: 10 INJECTION, EMULSION INTRAVENOUS at 08:39

## 2023-05-16 RX ADMIN — PROPOFOL 30 MG: 10 INJECTION, EMULSION INTRAVENOUS at 08:36

## 2023-05-16 RX ADMIN — PROPOFOL 30 MG: 10 INJECTION, EMULSION INTRAVENOUS at 08:38

## 2023-05-16 RX ADMIN — FENTANYL CITRATE 50 MCG: 50 INJECTION, SOLUTION INTRAMUSCULAR; INTRAVENOUS at 08:36

## 2023-05-16 RX ADMIN — PROPOFOL 30 MG: 10 INJECTION, EMULSION INTRAVENOUS at 08:35

## 2023-05-16 ASSESSMENT — PAIN SCALES - GENERAL: PAINLEVEL_OUTOF10: 0

## 2023-05-16 ASSESSMENT — PAIN - FUNCTIONAL ASSESSMENT: PAIN_FUNCTIONAL_ASSESSMENT: 0-10

## 2023-05-16 NOTE — ANESTHESIA POSTPROCEDURE EVALUATION
Department of Anesthesiology  Postprocedure Note    Patient: Farideh Espana  MRN: [id number]  YOB: 2000  Date of evaluation: 5/16/2023      Procedure Summary     Date: 05/16/23 Room / Location: [facility] ENDO 01 / [facility] ENDOSCOPY    Anesthesia Start: 3689 Anesthesia Stop: Ira Abts    Procedure: EGD BIOPSY (Upper GI Region) Diagnosis: Nausea and vomiting, unspecified vomiting type    Surgeons: Hoda Duval MD Responsible Provider: Madiha Juarez MD    Anesthesia Type: MAC, TIVA ASA Status: 1          Anesthesia Type: MAC, TIVA    Vega Phase I: Vega Score: 10    Vega Phase II:        Anesthesia Post Evaluation    Patient location during evaluation: bedside  Patient participation: complete - patient participated  Level of consciousness: responsive to light touch  Pain score: 0  Airway patency: patent  Nausea & Vomiting: no nausea and no vomiting  Complications: no  Cardiovascular status: hemodynamically stable  Respiratory status: acceptable  Hydration status: stable

## 2023-05-16 NOTE — ANESTHESIA PRE PROCEDURE
Department of Anesthesiology  Preprocedure Note       Name:  Viri Quiroga   Age:  21 y.o.  :  2000                                          MRN:  [id number]         Date:  2023      Surgeon: Kelly Anna):  Tj Barriga MD    Procedure: Procedure(s):  EGD BIOPSY    Medications prior to admission:   Prior to Admission medications    Medication Sig Start Date End Date Taking? Authorizing Provider   bictegravir-emtricitab-tenofovir alafenamide (BIKTARVY) -25 MG TABS per tablet Take 1 tablet by mouth daily 12/15/21   Ar Automatic Reconciliation   diphenoxylate-atropine (LOMOTIL) 2.5-0.025 MG per tablet Take 1 tablet by mouth 4 times daily as needed. Max Daily Amount: 4 tablets  Patient not taking: Reported on 22   Ar Automatic Reconciliation   lidocaine (LIDODERM) 5 % Apply patch to the affected area for 12 hours a day and remove for 12 hours a day. Patient not taking: Reported on 2023   Ar Automatic Reconciliation   Norgestim-Eth Estrad Triphasic 0.18/0.215/0.25 MG-35 MCG TABS Take 1 tablet by mouth daily 10/5/22   Ar Automatic Reconciliation       Current medications:    Current Facility-Administered Medications   Medication Dose Route Frequency Provider Last Rate Last Admin    lactated ringers IV soln infusion   IntraVENous Continuous Tj Barriga  mL/hr at 23 0750 New Bag at 23 0750       Allergies:  No Known Allergies    Problem List:  There is no problem list on file for this patient.       Past Medical History:        Diagnosis Date    HIV (human immunodeficiency virus infection) ([facility] [address]        Past Surgical History:        Procedure Laterality Date    EYE SURGERY         Social History:    Social History     Tobacco Use    Smoking status: Never    Smokeless tobacco: Never   Substance Use Topics    Alcohol use: Never                                Counseling given: Not Answered      Vital Signs (Current):   Vitals:    05/10/23 1107 23 0734

## 2023-05-16 NOTE — PROGRESS NOTES
IV DC'D SITE INTACT NO SWELLING NOTED , DISCHARGE INSTRUCTIONS GIVEN TO PT AND PT'S FRIEND ,NO DISTRESS NOTED

## 2023-05-23 ENCOUNTER — OFFICE VISIT (OUTPATIENT)
Age: 23
End: 2023-05-23
Payer: MEDICAID

## 2023-05-23 VITALS
RESPIRATION RATE: 16 BRPM | BODY MASS INDEX: 18.11 KG/M2 | OXYGEN SATURATION: 97 % | SYSTOLIC BLOOD PRESSURE: 106 MMHG | WEIGHT: 115.6 LBS | DIASTOLIC BLOOD PRESSURE: 70 MMHG | HEART RATE: 85 BPM | TEMPERATURE: 99.3 F

## 2023-05-23 DIAGNOSIS — N94.6 DYSMENORRHEA: Primary | ICD-10-CM

## 2023-05-23 PROCEDURE — 99213 OFFICE O/P EST LOW 20 MIN: CPT | Performed by: STUDENT IN AN ORGANIZED HEALTH CARE EDUCATION/TRAINING PROGRAM

## 2023-05-23 RX ORDER — MEDROXYPROGESTERONE ACETATE 150 MG/ML
150 INJECTION, SUSPENSION INTRAMUSCULAR
Qty: 1 ML | Refills: 3 | Status: SHIPPED | OUTPATIENT
Start: 2023-05-23

## 2023-05-23 SDOH — ECONOMIC STABILITY: HOUSING INSECURITY
IN THE LAST 12 MONTHS, WAS THERE A TIME WHEN YOU DID NOT HAVE A STEADY PLACE TO SLEEP OR SLEPT IN A SHELTER (INCLUDING NOW)?: NO

## 2023-05-23 SDOH — ECONOMIC STABILITY: INCOME INSECURITY: HOW HARD IS IT FOR YOU TO PAY FOR THE VERY BASICS LIKE FOOD, HOUSING, MEDICAL CARE, AND HEATING?: NOT VERY HARD

## 2023-05-23 SDOH — ECONOMIC STABILITY: FOOD INSECURITY: WITHIN THE PAST 12 MONTHS, YOU WORRIED THAT YOUR FOOD WOULD RUN OUT BEFORE YOU GOT MONEY TO BUY MORE.: OFTEN TRUE

## 2023-05-23 SDOH — ECONOMIC STABILITY: FOOD INSECURITY: WITHIN THE PAST 12 MONTHS, THE FOOD YOU BOUGHT JUST DIDN'T LAST AND YOU DIDN'T HAVE MONEY TO GET MORE.: SOMETIMES TRUE

## 2023-05-23 ASSESSMENT — PATIENT HEALTH QUESTIONNAIRE - PHQ9
1. LITTLE INTEREST OR PLEASURE IN DOING THINGS: 0
SUM OF ALL RESPONSES TO PHQ QUESTIONS 1-9: 0
SUM OF ALL RESPONSES TO PHQ QUESTIONS 1-9: 0
2. FEELING DOWN, DEPRESSED OR HOPELESS: 0
SUM OF ALL RESPONSES TO PHQ QUESTIONS 1-9: 0
SUM OF ALL RESPONSES TO PHQ QUESTIONS 1-9: 0
SUM OF ALL RESPONSES TO PHQ9 QUESTIONS 1 & 2: 0

## 2023-05-23 NOTE — PROGRESS NOTES
[address] [facility] [address] [place] [address]   Office [phone], Fax [phone]      Chief Complaint:   Rigoberto Olivo is a 21 y.o. female that presents for:     Chief Complaint   Patient presents with    Dysmenorrhea     Subjective:   HPI:  Rigoberto Olivo is a 21 y.o. female who presents to clinic for follow up of Dysmenorrhea. She has been having increased spotting and bleeding because she says that she has not been able to keep up with her OCP every day and if she misses dosages she starts to have bleeding. She would like to try another medication to help with her dysmenorrhea and prefers not to have any bleeding or periods. No light-headedness or dizziness. No blurry vision. Health Maintenance:  Health Maintenance Due   Topic Date Due    COVID-19 Vaccine (1) Never done    Varicella vaccine (1 of 2 - 2-dose childhood series) Never done    HPV vaccine (1 - 2-dose series) Never done    HIV screen  Never done    Chlamydia/GC screen  Never done    Hepatitis C screen  Never done    DTaP/Tdap/Td vaccine (1 - Tdap) Never done      ROS: per HPI    Past medical history, social history, and medications personally reviewed. Past Medical History:   Diagnosis Date    [facility]      Allergies personally reviewed. No Known Allergies   Objective:   Vitals reviewed. /70 (Site: Right Upper Arm, Position: Sitting)   Pulse 85   Temp 99.3 °F (37.4 °C) (Oral)   Resp 16   Wt 115 lb 9.6 oz (52.4 kg)   LMP 05/12/2023   SpO2 97%   BMI 18.11 kg/m²    Physical Exam  Vitals Reviewed. General AO x 3. No distress. Not diaphoretic. No jaundice. No cyanosis. No pallor. Cardio Normal rate, regular rhythm. No murmur, rubs, or gallop. Pulmonary Effort normal. No accessory muscle use. No wheezes, rales, or rhonchi. Assessment/Plan:   Sven Luna was seen today for dysmenorrhea.     Diagnoses and all orders for this visit:    Dysmenorrhea: is currently taking OCPs for this

## 2023-05-23 NOTE — PROGRESS NOTES
Room 23    Identified pt with two pt identifiers(name and ). Reviewed record in preparation for visit and have obtained necessary documentation. Chief Complaint   Patient presents with    Dysmenorrhea        Health Maintenance Due   Topic    COVID-19 Vaccine (1)    Varicella vaccine (1 of 2 - 2-dose childhood series)    HPV vaccine (1 - 2-dose series)    HIV screen     Chlamydia/GC screen     Hepatitis C screen     DTaP/Tdap/Td vaccine (1 - Tdap)       Vitals:    23 1427   BP: 106/70   Site: Right Upper Arm   Position: Sitting   Pulse: 85   Resp: 16   Temp: 99.3 °F (37.4 °C)   TempSrc: Oral   SpO2: 97%   Weight: 115 lb 9.6 oz (52.4 kg)         Coordination of Care Questionnaire:  :   1) Have you been to an emergency room, urgent care, or hospitalized since your last visit? If yes, where when, and reason for visit? no      2. Have seen or consulted any other health care provider since your last visit? No  If yes, where when, and reason for visit? Patient is accompanied by self I have received verbal consent from Kareem Spivey to discuss any/all medical information while they are present in the room.

## 2023-05-30 ENCOUNTER — OFFICE VISIT (OUTPATIENT)
Age: 23
End: 2023-05-30
Payer: MEDICAID

## 2023-05-30 VITALS
TEMPERATURE: 97.5 F | HEIGHT: 67 IN | HEART RATE: 75 BPM | RESPIRATION RATE: 18 BRPM | DIASTOLIC BLOOD PRESSURE: 73 MMHG | BODY MASS INDEX: 18.18 KG/M2 | SYSTOLIC BLOOD PRESSURE: 111 MMHG | OXYGEN SATURATION: 99 % | WEIGHT: 115.8 LBS

## 2023-05-30 DIAGNOSIS — Z11.59 NEED FOR HEPATITIS C SCREENING TEST: ICD-10-CM

## 2023-05-30 DIAGNOSIS — B20 HIV INFECTION, UNSPECIFIED SYMPTOM STATUS (HCC): ICD-10-CM

## 2023-05-30 DIAGNOSIS — N94.6 DYSMENORRHEA: Primary | ICD-10-CM

## 2023-05-30 DIAGNOSIS — A04.8 H. PYLORI INFECTION: ICD-10-CM

## 2023-05-30 DIAGNOSIS — R53.83 FATIGUE, UNSPECIFIED TYPE: ICD-10-CM

## 2023-05-30 PROBLEM — Z21 HIV INFECTION (HCC): Status: ACTIVE | Noted: 2023-05-30

## 2023-05-30 LAB
HCG, PREGNANCY, URINE, POC: NEGATIVE
VALID INTERNAL CONTROL, POC: YES

## 2023-05-30 PROCEDURE — 99214 OFFICE O/P EST MOD 30 MIN: CPT | Performed by: STUDENT IN AN ORGANIZED HEALTH CARE EDUCATION/TRAINING PROGRAM

## 2023-05-30 PROCEDURE — 81025 URINE PREGNANCY TEST: CPT | Performed by: STUDENT IN AN ORGANIZED HEALTH CARE EDUCATION/TRAINING PROGRAM

## 2023-05-30 PROCEDURE — PBSHW AMB POC URINE PREGNANCY TEST, VISUAL COLOR COMPARISON: Performed by: STUDENT IN AN ORGANIZED HEALTH CARE EDUCATION/TRAINING PROGRAM

## 2023-05-30 RX ORDER — BISMUTH SUBSALICYLATE 262 MG/1
524 TABLET, CHEWABLE ORAL 4 TIMES DAILY
Qty: 80 TABLET | Refills: 0 | Status: SHIPPED | OUTPATIENT
Start: 2023-05-30 | End: 2023-06-09

## 2023-05-30 RX ORDER — BICTEGRAVIR SODIUM, EMTRICITABINE, AND TENOFOVIR ALAFENAMIDE FUMARATE 50; 200; 25 MG/1; MG/1; MG/1
1 TABLET ORAL DAILY
Qty: 30 TABLET | Refills: 1 | Status: SHIPPED | OUTPATIENT
Start: 2023-05-30

## 2023-05-30 RX ORDER — OMEPRAZOLE 40 MG/1
40 CAPSULE, DELAYED RELEASE ORAL 2 TIMES DAILY
Qty: 20 CAPSULE | Refills: 0 | Status: SHIPPED | OUTPATIENT
Start: 2023-05-30 | End: 2023-06-09

## 2023-05-30 RX ORDER — METRONIDAZOLE 250 MG/1
250 TABLET ORAL 4 TIMES DAILY
Qty: 40 TABLET | Refills: 0 | Status: SHIPPED | OUTPATIENT
Start: 2023-05-30 | End: 2023-06-09

## 2023-05-30 RX ORDER — TETRACYCLINE HYDROCHLORIDE 500 MG/1
500 CAPSULE ORAL 4 TIMES DAILY
Qty: 40 CAPSULE | Refills: 0 | Status: SHIPPED | OUTPATIENT
Start: 2023-05-30 | End: 2023-06-09

## 2023-05-30 ASSESSMENT — PATIENT HEALTH QUESTIONNAIRE - PHQ9
SUM OF ALL RESPONSES TO PHQ QUESTIONS 1-9: 0
1. LITTLE INTEREST OR PLEASURE IN DOING THINGS: 0
SUM OF ALL RESPONSES TO PHQ9 QUESTIONS 1 & 2: 0
2. FEELING DOWN, DEPRESSED OR HOPELESS: 0
SUM OF ALL RESPONSES TO PHQ QUESTIONS 1-9: 0

## 2023-05-30 NOTE — ASSESSMENT & PLAN NOTE
Chronic, unclear if controlled or uncontrolled  - Refill Biktarvy so patient does not run out medication before she is able to follow up with her specialists.  Counseled patient she needs to follow with them, she will call to schedule visit  - CD4 count

## 2023-05-30 NOTE — ASSESSMENT & PLAN NOTE
Acute on chronic, ddx is borad includes HIV, electrolyte abnormalities, thyroid dysfunx  - TSH, BMP, CBC

## 2023-05-30 NOTE — PROGRESS NOTES
Chief Complaint   Patient presents with    Follow-up Chronic Condition     Patient states that her hormones are totally out of balance. Vitals:    05/30/23 1440   BP: 111/73   Site: Right Upper Arm   Position: Sitting   Cuff Size: Medium Adult   Pulse: 75   Resp: 18   Temp: 97.5 °F (36.4 °C)   TempSrc: Temporal   SpO2: 99%   Weight: 115 lb 12.8 oz (52.5 kg)   Height: 5' 7\" (1.702 m)     1. Have you been to the ER, urgent care clinic since your last visit? Hospitalized since your last visit? No    2. Have you seen or consulted any other health care providers outside of the [address] [place] [address] since your last visit? Include any pap smears or colon screening. No    Patient brought in the Depo injection. UPT is negative. Administered Depo, IM, right deltoid.    Meri Solomon 47: [id number]  LOT: [id number]  EXP.: 01/15/2025

## 2023-05-30 NOTE — PROGRESS NOTES
[address] [facility] [address] [place] [address]   Office [phone]  Fax [phone]     5/30/2023   Chief Complaint   Patient presents with    Follow-up Chronic Condition     Patient states that her hormones are totally out of balance. HPI:  Margot Delaney is a 21 y.o. female who presents to clinic today for:     #Dysmenorrhea: Had two periods in 2 weeks. Feels terrible during her period, has even passed out and been evaluated in the ED. #Feels cold and tired all the time. Recent lab work done in the ED. #Also had EGD while she was in the hospital. Pathology results +Helicobacter. Currently no abd pain or reflux    #HIV: Would like CD4 counts checked as she hasn't been able to schedule follow up with her doctors. She usually follows at [facility] and sees a different doctor every time. Currently on Biktarvy. Has last few tablets of Biktarvy, needs refill. Patients past medical, surgical and family histories were reviewed. Allergies and Medications reviewed and updated. ROS: see HPI       Objective:  Vitals:    05/30/23 1440   BP: 111/73   Site: Right Upper Arm   Position: Sitting   Cuff Size: Medium Adult   Pulse: 75   Resp: 18   Temp: 97.5 °F (36.4 °C)   TempSrc: Temporal   SpO2: 99%   Weight: 115 lb 12.8 oz (52.5 kg)   Height: 5' 7\" (1.702 m)     Body mass index is 18.14 kg/m². Physical Exam  General: Patient alert and oriented and in NAD  HEENT: PER/EOMI, no conjunctival pallor or scleral icterus. Heart: Regular rate and rhythm, No murmurs, rubs or gallops.   2+ peripheral pulses  Lungs: Clear to auscultation bilaterally, no wheezing, rales or rhonchi  Abd: +BS, non-tender, non-distended  Ext: No edema  Skin: No rashes or lesions noted on exposed skin,  Psych: Appropriate mood and affect    Recent Results (from the past 24 hour(s))   AMB POC URINE PREGNANCY TEST, VISUAL COLOR COMPARISON    Collection Time: 05/30/23  3:15 PM   Result Value Ref Range    Valid

## 2023-05-30 NOTE — ASSESSMENT & PLAN NOTE
Acute, found on EGD.  Currently asymptomatic   - Quad therapy  - Follow up for H pylori stool test 4 weeks after completion of therapy

## 2023-05-31 ENCOUNTER — TELEPHONE (OUTPATIENT)
Age: 23
End: 2023-05-31

## 2023-05-31 LAB
ANION GAP SERPL CALC-SCNC: 7 MMOL/L (ref 5–15)
BUN SERPL-MCNC: 7 MG/DL (ref 6–20)
BUN/CREAT SERPL: 7 (ref 12–20)
CALCIUM SERPL-MCNC: 9.8 MG/DL (ref 8.5–10.1)
CHLORIDE SERPL-SCNC: 104 MMOL/L (ref 97–108)
CO2 SERPL-SCNC: 27 MMOL/L (ref 21–32)
CREAT SERPL-MCNC: 0.96 MG/DL (ref 0.55–1.02)
ERYTHROCYTE [DISTWIDTH] IN BLOOD BY AUTOMATED COUNT: 12.7 % (ref 11.5–14.5)
GLUCOSE SERPL-MCNC: 82 MG/DL (ref 65–100)
HCT VFR BLD AUTO: 41.4 % (ref 35–47)
HCV AB SERPL QL IA: NONREACTIVE
HGB BLD-MCNC: 13.3 G/DL (ref 11.5–16)
MCH RBC QN AUTO: 33.6 PG (ref 26–34)
MCHC RBC AUTO-ENTMCNC: 32.1 G/DL (ref 30–36.5)
MCV RBC AUTO: 104.5 FL (ref 80–99)
NRBC # BLD: 0 K/UL (ref 0–0.01)
NRBC BLD-RTO: 0 PER 100 WBC
PLATELET # BLD AUTO: 271 K/UL (ref 150–400)
PMV BLD AUTO: 10 FL (ref 8.9–12.9)
POTASSIUM SERPL-SCNC: 4.1 MMOL/L (ref 3.5–5.1)
RBC # BLD AUTO: 3.96 M/UL (ref 3.8–5.2)
SODIUM SERPL-SCNC: 138 MMOL/L (ref 136–145)
WBC # BLD AUTO: 4.7 K/UL (ref 3.6–11)

## 2023-05-31 NOTE — TELEPHONE ENCOUNTER
----- Message from Farzad Peace sent at 5/30/2023 12:54 PM EDT -----  Subject: Message to Provider    QUESTIONS  Information for Provider? Lai Suh would like to schedule an   appointment to receive her Depo shot. Please give her a call back to   schedule. [facility] unable to transfer. ---------------------------------------------------------------------------  --------------  Rahat WEST  [phone]; OK to leave message on voicemail  ---------------------------------------------------------------------------  --------------  SCRIPT ANSWERS  Relationship to Patient?  Self

## 2023-06-01 LAB
BASOPHILS # BLD AUTO: 0 X10E3/UL (ref 0–0.2)
BASOPHILS NFR BLD AUTO: 1 %
CD3+CD4+ CELLS # BLD: 1296 /UL (ref 359–1519)
CD3+CD4+ CELLS NFR BLD: 46.3 % (ref 30.8–58.5)
EOSINOPHIL # BLD AUTO: 0.1 X10E3/UL (ref 0–0.4)
EOSINOPHIL NFR BLD AUTO: 3 %
ERYTHROCYTE [DISTWIDTH] IN BLOOD BY AUTOMATED COUNT: 12.9 % (ref 11.7–15.4)
HCT VFR BLD AUTO: 41 % (ref 34–46.6)
HGB BLD-MCNC: 13.6 G/DL (ref 11.1–15.9)
IMM GRANULOCYTES # BLD: 0 X10E3/UL (ref 0–0.1)
IMM GRANULOCYTES NFR BLD: 0 %
LYMPHOCYTES # BLD AUTO: 2.8 X10E3/UL (ref 0.7–3.1)
LYMPHOCYTES NFR BLD AUTO: 65 %
MCH RBC QN AUTO: 33.6 PG (ref 26.6–33)
MCHC RBC AUTO-ENTMCNC: 33.2 G/DL (ref 31.5–35.7)
MCV RBC AUTO: 101 FL (ref 79–97)
MONOCYTES # BLD AUTO: 0.2 X10E3/UL (ref 0.1–0.9)
MONOCYTES NFR BLD AUTO: 5 %
MORPHOLOGY BLD-IMP: ABNORMAL
NEUTROPHILS # BLD AUTO: 1.1 X10E3/UL (ref 1.4–7)
NEUTROPHILS NFR BLD AUTO: 26 %
PLATELET # BLD AUTO: 263 X10E3/UL (ref 150–450)
RBC # BLD AUTO: 4.05 X10E6/UL (ref 3.77–5.28)
TSH SERPL DL<=0.05 MIU/L-ACNC: 1.23 UIU/ML (ref 0.45–4.5)
WBC # BLD AUTO: 4.3 X10E3/UL (ref 3.4–10.8)

## 2023-06-02 ENCOUNTER — TELEPHONE (OUTPATIENT)
Age: 23
End: 2023-06-02

## 2023-06-02 NOTE — TELEPHONE ENCOUNTER
----- Message from Darian Lea sent at 5/31/2023  8:38 AM EDT -----  Subject: Referral Request    Reason for referral request? dermatology  Provider patient wants to be referred to(if known):     Provider Phone Number(if known):     Additional Information for Provider? patient is requesting a referral to a   dermatologist one that accepts her insurance, she has [facility].  ---------------------------------------------------------------------------  --------------  [id number] [facility]    [phone]; OK to leave message on voicemail  ---------------------------------------------------------------------------  --------------

## 2023-07-18 ENCOUNTER — OFFICE VISIT (OUTPATIENT)
Age: 23
End: 2023-07-18
Payer: MEDICAID

## 2023-07-18 VITALS
HEIGHT: 67 IN | OXYGEN SATURATION: 97 % | DIASTOLIC BLOOD PRESSURE: 68 MMHG | RESPIRATION RATE: 14 BRPM | TEMPERATURE: 99.2 F | HEART RATE: 85 BPM | BODY MASS INDEX: 18.52 KG/M2 | WEIGHT: 118 LBS | SYSTOLIC BLOOD PRESSURE: 105 MMHG

## 2023-07-18 DIAGNOSIS — B20 HIV INFECTION, UNSPECIFIED SYMPTOM STATUS (HCC): ICD-10-CM

## 2023-07-18 DIAGNOSIS — Z11.3 ROUTINE SCREENING FOR STI (SEXUALLY TRANSMITTED INFECTION): Primary | ICD-10-CM

## 2023-07-18 DIAGNOSIS — Z21 ASYMPTOMATIC HIV INFECTION, WITH NO HISTORY OF HIV-RELATED ILLNESS (HCC): ICD-10-CM

## 2023-07-18 PROCEDURE — 99214 OFFICE O/P EST MOD 30 MIN: CPT

## 2023-07-18 RX ORDER — BICTEGRAVIR SODIUM, EMTRICITABINE, AND TENOFOVIR ALAFENAMIDE FUMARATE 50; 200; 25 MG/1; MG/1; MG/1
1 TABLET ORAL DAILY
Qty: 30 TABLET | Refills: 1 | Status: SHIPPED | OUTPATIENT
Start: 2023-07-18 | End: 2023-07-18

## 2023-07-18 RX ORDER — BICTEGRAVIR SODIUM, EMTRICITABINE, AND TENOFOVIR ALAFENAMIDE FUMARATE 50; 200; 25 MG/1; MG/1; MG/1
1 TABLET ORAL DAILY
Qty: 30 TABLET | Refills: 0 | Status: SHIPPED | OUTPATIENT
Start: 2023-07-18

## 2023-07-18 NOTE — PROGRESS NOTES
[facility] [place] [facility], [address] [facility]   Office [phone], Fax [phone]      Chief Complaint:     Chief Complaint   Patient presents with    Annual Exam     Patient would like STD screening. She is also requesting a medication. Madelyn Gautam is a 21 y.o. female that presents for: STI screening, med refill      Subjective:   HPI:  Madelyn Gautam is a 21 y.o. female that presents for:    STI testing: No high risk encounters, just wants to be safe and get routine screening. Not currently sexually active. 2 partners in the past year. Sometimes uses protection. No specific concerns regarding these partners. Denies genital lesions or concerns. HIV: Goes to HIV clinic at [facility]. Needs refill of Biktarvy to carry her until her next appt at clinic. No SE of medication. Past medical history, social history, medications, and allergies personally reviewed. Past Medical History:   Diagnosis Date    HIV (human immunodeficiency virus infection) ([facility])         Social Hx:   Alcohol: none  Tobacco: none  Illicit drug use: marijuana daily    Medications:   Current Outpatient Medications   Medication Sig    bictegravir-emtricitab-tenofovir alafenamide (BIKTARVY) -25 MG TABS per tablet Take 1 tablet by mouth daily    medroxyPROGESTERone (DEPO-PROVERA) 150 MG/ML injection Inject 1 mL into the muscle every 3 months    omeprazole (PRILOSEC) 40 MG delayed release capsule Take 1 capsule by mouth in the morning and at bedtime for 10 days     No current facility-administered medications for this visit.         Allergies:  No Known Allergies     Health Maintenance:  Health Maintenance Due   Topic Date Due    COVID-19 Vaccine (1) Never done    Hepatitis A vaccine (1 of 2 - Risk 2-dose series) Never done    Varicella vaccine (1 of 2 - 2-dose childhood series) Never done    Meningococcal (ACWY) vaccine (1 - Risk 2-dose series) Never done    Pneumococcal 0-64 years Vaccine (1 - PCV) Never done    HPV vaccine

## 2023-07-20 LAB — RPR SER QL: NONREACTIVE

## 2023-07-22 LAB
C TRACH RRNA SPEC QL NAA+PROBE: NEGATIVE
N GONORRHOEA RRNA SPEC QL NAA+PROBE: NEGATIVE
SPECIMEN SOURCE: NORMAL
T VAGINALIS RRNA SPEC QL NAA+PROBE: NEGATIVE

## 2023-07-25 ENCOUNTER — OFFICE VISIT (OUTPATIENT)
Age: 23
End: 2023-07-25
Payer: MEDICAID

## 2023-07-25 VITALS
TEMPERATURE: 98.3 F | BODY MASS INDEX: 17.39 KG/M2 | WEIGHT: 111 LBS | DIASTOLIC BLOOD PRESSURE: 72 MMHG | HEART RATE: 67 BPM | SYSTOLIC BLOOD PRESSURE: 112 MMHG

## 2023-07-25 DIAGNOSIS — A04.8 H. PYLORI INFECTION: Primary | ICD-10-CM

## 2023-07-25 PROCEDURE — 99213 OFFICE O/P EST LOW 20 MIN: CPT

## 2023-07-25 RX ORDER — TETRACYCLINE HYDROCHLORIDE 500 MG/1
500 CAPSULE ORAL 4 TIMES DAILY
Qty: 56 CAPSULE | Refills: 0 | Status: SHIPPED | OUTPATIENT
Start: 2023-07-25 | End: 2023-08-08

## 2023-07-25 RX ORDER — OMEPRAZOLE 40 MG/1
40 CAPSULE, DELAYED RELEASE ORAL 2 TIMES DAILY
Qty: 28 CAPSULE | Refills: 0 | Status: SHIPPED | OUTPATIENT
Start: 2023-07-25 | End: 2023-08-08

## 2023-07-25 RX ORDER — BISMUTH SUBSALICYLATE 262 MG/1
524 TABLET, CHEWABLE ORAL
Qty: 112 TABLET | Refills: 0 | Status: SHIPPED | OUTPATIENT
Start: 2023-07-25 | End: 2023-08-08

## 2023-07-25 RX ORDER — METRONIDAZOLE 500 MG/1
500 TABLET ORAL 4 TIMES DAILY
Qty: 56 TABLET | Refills: 0 | Status: SHIPPED | OUTPATIENT
Start: 2023-07-25 | End: 2023-08-08

## 2023-07-28 ENCOUNTER — TELEPHONE (OUTPATIENT)
Age: 23
End: 2023-07-28

## 2023-07-28 ASSESSMENT — ENCOUNTER SYMPTOMS
ANAL BLEEDING: 0
BLOOD IN STOOL: 0
ABDOMINAL DISTENTION: 0
VOMITING: 0
NAUSEA: 1
ABDOMINAL PAIN: 1
DIARRHEA: 0
CONSTIPATION: 0

## 2023-07-28 NOTE — TELEPHONE ENCOUNTER
Patient called and stated that her insurance will not cover these two medications. She would like a generic brand or something else that will be covered under her insurance.       tetracycline (ACHROMYCIN;SUMYCIN) 500 MG capsule   bismuth subsalicylate (PEPTO-BISMOL) 262 MG chewable tablet

## 2023-08-01 ENCOUNTER — TELEPHONE (OUTPATIENT)
Age: 23
End: 2023-08-01

## 2023-08-01 DIAGNOSIS — A04.8 H. PYLORI INFECTION: Primary | ICD-10-CM

## 2023-08-01 RX ORDER — CLARITHROMYCIN 500 MG/1
500 TABLET, COATED ORAL 2 TIMES DAILY
Qty: 28 TABLET | Refills: 0 | Status: SHIPPED | OUTPATIENT
Start: 2023-08-01 | End: 2023-08-15

## 2023-08-01 NOTE — PROGRESS NOTES
Treating H Pylori infection. Insurance not covering Tetracycline  Alternative Clarithromycin 500mg PO BID x14 days sent  Patient to take along with the rest of the quad therapy.

## 2023-08-01 NOTE — TELEPHONE ENCOUNTER
Pt stated that the Pharmacist informed her that he insurance will not cover the Tetracycline Rx and it costs $700 for a 2 week supply. Pt is requesting an alternative medication that would be cheaper or covered by insurance. This writer suggested Good Rx naveen. She attempted; however, she stated it was still to expensive. Pt stated that she need the medication asap.     Thank you

## 2023-08-15 ENCOUNTER — TELEPHONE (OUTPATIENT)
Age: 23
End: 2023-08-15

## 2023-08-15 NOTE — TELEPHONE ENCOUNTER
Tristian Varma [place],    I spoke with patient and she said that she was prescribed the depo shot. She stated she no longer want to use the depo she rather be on the pill. She is asking if a new prescription can be sent for the pill to her pharmacy. If an appointment is needed prior to this please let me know. Thank you.     [address] [place] [address]

## 2023-08-21 ENCOUNTER — TELEPHONE (OUTPATIENT)
Age: 23
End: 2023-08-21

## 2023-08-21 ENCOUNTER — OFFICE VISIT (OUTPATIENT)
Age: 23
End: 2023-08-21
Payer: MEDICAID

## 2023-08-21 VITALS
OXYGEN SATURATION: 99 % | RESPIRATION RATE: 16 BRPM | SYSTOLIC BLOOD PRESSURE: 115 MMHG | WEIGHT: 112.4 LBS | HEART RATE: 86 BPM | HEIGHT: 67 IN | BODY MASS INDEX: 17.64 KG/M2 | DIASTOLIC BLOOD PRESSURE: 77 MMHG | TEMPERATURE: 98.3 F

## 2023-08-21 DIAGNOSIS — N94.89 SUPPRESSION OF MENSES: Primary | ICD-10-CM

## 2023-08-21 DIAGNOSIS — F33.0 MILD EPISODE OF RECURRENT MAJOR DEPRESSIVE DISORDER (HCC): ICD-10-CM

## 2023-08-21 DIAGNOSIS — R63.0 DECREASED APPETITE: ICD-10-CM

## 2023-08-21 LAB
HCG, PREGNANCY, URINE, POC: NEGATIVE
VALID INTERNAL CONTROL, POC: NORMAL

## 2023-08-21 PROCEDURE — 99214 OFFICE O/P EST MOD 30 MIN: CPT | Performed by: STUDENT IN AN ORGANIZED HEALTH CARE EDUCATION/TRAINING PROGRAM

## 2023-08-21 PROCEDURE — PBSHW AMB POC URINE PREGNANCY TEST, VISUAL COLOR COMPARISON: Performed by: STUDENT IN AN ORGANIZED HEALTH CARE EDUCATION/TRAINING PROGRAM

## 2023-08-21 PROCEDURE — 81025 URINE PREGNANCY TEST: CPT | Performed by: STUDENT IN AN ORGANIZED HEALTH CARE EDUCATION/TRAINING PROGRAM

## 2023-08-21 RX ORDER — MIRTAZAPINE 15 MG/1
15 TABLET, FILM COATED ORAL NIGHTLY
Qty: 30 TABLET | Refills: 0 | Status: SHIPPED | OUTPATIENT
Start: 2023-08-21

## 2023-08-21 NOTE — TELEPHONE ENCOUNTER
Michelle Rodriguez, Pharmacist at [facility] is requesting a returned phone call to confirm that you would like for pt to take Tri Sprintec.     Thank you

## 2023-08-21 NOTE — PATIENT INSTRUCTIONS
[address] [facility]  Phone [phone]    Crisis Intervention - Available 24/7  Phone [phone]

## 2023-09-05 ENCOUNTER — TELEPHONE (OUTPATIENT)
Age: 23
End: 2023-09-05

## 2023-09-05 NOTE — TELEPHONE ENCOUNTER
I called the patient today and got her scheduled for a procedure appointment since we received her Nexplanon in clinic today. She is aware about date and time.

## 2023-09-10 NOTE — PROGRESS NOTES
[address]      Chief Complaint:     Chief Complaint   Patient presents with    Follow-up Chronic Condition     Patient is here to follow up on H. Pylori. Jose Parnell is a 21 y.o. female that presents for: H pylori follow up      Assessment/Plan:   I personally reviewed the following Pertinent Labs/Studies:   - Encounter notes, labs, and imaging from 7/25/23. Pt is a 21year old female presenting 4 weeks s/p 2-wk quadruple therapy for H pylori for eradication testing via stool antigen test.     Ilana Gates was seen today for follow-up chronic condition. Diagnoses and all orders for this visit:    H. pylori infection: s/p 2 weeks bismuth quadruple therapy. Symptoms improved. Stool testing for eradication today. If persistently positive initiate salvage therapy with Levaquin triple therapy or high-dose dual therapy.   -     H. Pylori Antigen, Stool; Future           Follow up:     Return in about 11 days (around 9/22/2023) for Nexplanon insertion. .     Subjective:   HPI:  Jose Parnell is a 21 y.o. female that presents for:    Patient reports taking her quadruple therapy over the two weeks, but over the last four days took them over a period of six days due to \"hectic sleep schedule\". Overall feels better but feels like there is a \"bubble\" in her stomach. When she smokes marijuana it relaxes. No burning or pain. No reflux, n/v/d. No other complaints at this time. Health Maintenance:  Health Maintenance Due   Topic Date Due    Varicella vaccine (1 of 2 - 2-dose childhood series) Never done    Meningococcal (ACWY) vaccine (1 - Risk 2-dose series) Never done    COVID-19 Vaccine (1) Never done    Pneumococcal 0-64 years Vaccine (1 - PCV) Never done    HPV vaccine (1 - Risk 3-dose series) Never done    Measles,Mumps,Rubella (MMR) vaccine (1 of 2 - Risk 2-dose series) Never done    Shingles vaccine (1 of 2) Never done    Flu vaccine (1) Never done        Review of Systems  Per HPI.       Past

## 2023-09-11 ENCOUNTER — OFFICE VISIT (OUTPATIENT)
Age: 23
End: 2023-09-11
Payer: MEDICAID

## 2023-09-11 VITALS
TEMPERATURE: 98.3 F | HEART RATE: 61 BPM | WEIGHT: 114.6 LBS | BODY MASS INDEX: 17.99 KG/M2 | SYSTOLIC BLOOD PRESSURE: 105 MMHG | HEIGHT: 67 IN | DIASTOLIC BLOOD PRESSURE: 71 MMHG | RESPIRATION RATE: 15 BRPM

## 2023-09-11 DIAGNOSIS — A04.8 H. PYLORI INFECTION: Primary | ICD-10-CM

## 2023-09-11 PROCEDURE — 99213 OFFICE O/P EST LOW 20 MIN: CPT | Performed by: STUDENT IN AN ORGANIZED HEALTH CARE EDUCATION/TRAINING PROGRAM

## 2023-09-18 DIAGNOSIS — A04.8 H. PYLORI INFECTION: ICD-10-CM

## 2023-09-21 DIAGNOSIS — A04.8 HELICOBACTER PYLORI INFECTION: Primary | ICD-10-CM

## 2023-09-21 LAB
H PYLORI AG STL QL IA: POSITIVE
SPECIMEN SOURCE: ABNORMAL

## 2023-09-21 RX ORDER — TETRACYCLINE HYDROCHLORIDE 500 MG/1
500 CAPSULE ORAL 4 TIMES DAILY
Qty: 56 CAPSULE | Refills: 0 | Status: SHIPPED | OUTPATIENT
Start: 2023-09-21 | End: 2023-10-05

## 2023-09-21 RX ORDER — CLARITHROMYCIN 500 MG/1
500 TABLET, COATED ORAL 2 TIMES DAILY
Qty: 28 TABLET | Refills: 0 | Status: SHIPPED | OUTPATIENT
Start: 2023-09-21 | End: 2023-10-05

## 2023-09-21 RX ORDER — BISMUTH SUBSALICYLATE 262 MG/1
524 TABLET, CHEWABLE ORAL
Qty: 112 TABLET | Refills: 0 | Status: SHIPPED | OUTPATIENT
Start: 2023-09-21 | End: 2023-10-05

## 2023-09-21 RX ORDER — PANTOPRAZOLE SODIUM 40 MG/1
40 TABLET, DELAYED RELEASE ORAL 2 TIMES DAILY
Qty: 28 TABLET | Refills: 0 | Status: SHIPPED | OUTPATIENT
Start: 2023-09-21 | End: 2023-10-05

## 2023-09-21 NOTE — PROGRESS NOTES
Pt with persistent H pylori despite bismuth quadruple therapy. Initiating salvage therapy with Clarithromycin quadruple therapy.

## 2023-09-21 NOTE — RESULT ENCOUNTER NOTE
H pylori persistently positive despite bismuth quadruple therapy. Will initiate salvage therapy with clarithromycin-based quadruple therapy.

## 2023-09-22 ENCOUNTER — PROCEDURE VISIT (OUTPATIENT)
Age: 23
End: 2023-09-22
Payer: MEDICAID

## 2023-09-22 VITALS
WEIGHT: 113 LBS | RESPIRATION RATE: 16 BRPM | SYSTOLIC BLOOD PRESSURE: 100 MMHG | OXYGEN SATURATION: 98 % | BODY MASS INDEX: 17.7 KG/M2 | HEART RATE: 83 BPM | DIASTOLIC BLOOD PRESSURE: 66 MMHG

## 2023-09-22 DIAGNOSIS — A04.8 H. PYLORI INFECTION: ICD-10-CM

## 2023-09-22 DIAGNOSIS — N94.6 DYSMENORRHEA: Primary | ICD-10-CM

## 2023-09-22 PROCEDURE — 99213 OFFICE O/P EST LOW 20 MIN: CPT | Performed by: FAMILY MEDICINE

## 2023-09-22 ASSESSMENT — PATIENT HEALTH QUESTIONNAIRE - PHQ9
SUM OF ALL RESPONSES TO PHQ QUESTIONS 1-9: 0
SUM OF ALL RESPONSES TO PHQ9 QUESTIONS 1 & 2: 0
1. LITTLE INTEREST OR PLEASURE IN DOING THINGS: 0
2. FEELING DOWN, DEPRESSED OR HOPELESS: 0
SUM OF ALL RESPONSES TO PHQ QUESTIONS 1-9: 0

## 2023-09-22 NOTE — PROGRESS NOTES
CC: dysmenorrhea   Subjective   Hitesh Rodgers is an 21 y.o. female who presents for dysmenorrhea. #dysmenorrhea   This is a chronic problem   Patient currently on combined OCP and tolerating well. Last depo injection was approximately two months ago but would like to discontinue this method     Had previously been considering nexplanon but would like to continue with pills   Previously had IUD but discontinued due to discomfort. Was on combined OCPs in the past but had some irregular bleeding which she has not noticed since starting OCPs this time   Patient reports having regular menses, has multiple sexual partners with inconsistent condom use     #H. Pylori   Previously diagnosed and treated with quadruple therapy   Reports that she completed treatment with the exception of missing a few tablets during the last 3-4 days of treatment   Repeat testing 9/18/23 with + H. Pylori result   Medication Rx for repeat therapy was sent to the pharmacy yesterday 9/21/23, patient made aware       Review of Systems   General: No fevers. No chills. HENT: No congestion. No rhinorrhea. No sore throat. Eyes: No eye pain. No eye redness. Respiratory: No cough. No shortness of breath. Cardio: No chest pain. No leg swelling. No palpitations. GI: No abd pain. No n/v. No diarrhea. No constipation. : No frequency. No dysuria. No urgency. MSK: No back pain. No neck pain. Neuro: No headaches. No dizziness.      Allergies   No Known Allergies    Medications  Current Outpatient Medications   Medication Sig    tetracycline (ACHROMYCIN;SUMYCIN) 500 MG capsule Take 1 capsule by mouth 4 times daily for 14 days    clarithromycin (BIAXIN) 500 MG tablet Take 1 tablet by mouth 2 times daily for 14 days    pantoprazole (PROTONIX) 40 MG tablet Take 1 tablet by mouth 2 times daily for 14 days    bismuth subsalicylate (PEPTO BISMOL) 262 MG chewable tablet Take 2 tablets by mouth 4 times daily (before meals and nightly) for 14 days

## 2023-09-22 NOTE — PATIENT INSTRUCTIONS
Use condoms in addition to birth control pills while you are taking the treatment for the H. Pylori infection as the medications for this infection can make your birth control pills less effective.

## 2023-10-18 ENCOUNTER — OFFICE VISIT (OUTPATIENT)
Age: 23
End: 2023-10-18
Payer: MEDICAID

## 2023-10-18 VITALS
HEART RATE: 89 BPM | SYSTOLIC BLOOD PRESSURE: 98 MMHG | WEIGHT: 114.38 LBS | BODY MASS INDEX: 17.91 KG/M2 | OXYGEN SATURATION: 98 % | DIASTOLIC BLOOD PRESSURE: 64 MMHG

## 2023-10-18 DIAGNOSIS — M22.8X9 PATELLAR MALTRACKING, UNSPECIFIED LATERALITY: Primary | ICD-10-CM

## 2023-10-18 DIAGNOSIS — S89.91XA INJURY OF RIGHT KNEE, INITIAL ENCOUNTER: ICD-10-CM

## 2023-10-18 DIAGNOSIS — M22.2X1 PATELLOFEMORAL PAIN SYNDROME OF RIGHT KNEE: ICD-10-CM

## 2023-10-18 PROCEDURE — 99214 OFFICE O/P EST MOD 30 MIN: CPT | Performed by: STUDENT IN AN ORGANIZED HEALTH CARE EDUCATION/TRAINING PROGRAM

## 2023-10-18 RX ORDER — NAPROXEN 500 MG/1
500 TABLET ORAL 2 TIMES DAILY WITH MEALS
Qty: 60 TABLET | Refills: 0 | Status: SHIPPED | OUTPATIENT
Start: 2023-10-18
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
## 2023-11-20 NOTE — ED PROVIDER NOTES
[facility] EMERGENCY DEPT  EMERGENCY DEPARTMENT HISTORY AND PHYSICAL EXAM        Date: 5/6/2023  Patient Name: Sonido Rendon  MRN: [id number]  [place]: 2000  Date of evaluation: 5/6/2023  Provider: Anthony Cortez DO   Note Started: 1:13 PM EDT 5/6/23    HISTORY OF PRESENT ILLNESS     Chief Complaint   Patient presents with    Nausea & Vomiting       History Provided By: Patient    HPI: Sonido Rendon is a 21 y.o. female with past medical history significant for the below presenting to emergency department for evaluation of nausea, vomiting. Patient reports that she has had these symptoms for the last 3 to 4 months. States that they typically coincide with her menstrual cycle. Has not followed with OB/GYN. Was recently here for similar complaints. PAST MEDICAL HISTORY   Past Medical History:  Past Medical History:   Diagnosis Date    HIV (human immunodeficiency virus infection) ([facility] [address]        Past Surgical History:  History reviewed. No pertinent surgical history. Family History:  History reviewed. No pertinent family history. Social History:  Social History     Tobacco Use    Smoking status: Never    Smokeless tobacco: Never   Substance Use Topics    Alcohol use: Never    Drug use: Yes     Types: Marijuana Chiquita Lesser)       Allergies:  No Known Allergies    PCP: Sonya Ortiz MD    Current Meds:   No current facility-administered medications for this encounter. Current Outpatient Medications   Medication Sig Dispense Refill    cephALEXin (KEFLEX) 500 MG capsule Take 1 capsule by mouth 3 times daily for 5 days 14 capsule 0    bictegravir-emtricitab-tenofovir alafenamide (BIKTARVY) -25 MG TABS per tablet Take 1 tablet by mouth daily      diphenoxylate-atropine (LOMOTIL) 2.5-0.025 MG per tablet Take 1 tablet by mouth 4 times daily as needed. Max Daily Amount: 4 tablets      lidocaine (LIDODERM) 5 % Apply patch to the affected area for 12 hours a day and remove for 12 hours a day.       [facility]

## 2024-01-10 ENCOUNTER — TELEPHONE (OUTPATIENT)
Age: 24
End: 2024-01-10

## 2024-01-11 ENCOUNTER — OFFICE VISIT (OUTPATIENT)
Age: 24
End: 2024-01-11
Payer: MEDICAID

## 2024-01-11 VITALS
HEART RATE: 86 BPM | BODY MASS INDEX: 18.32 KG/M2 | DIASTOLIC BLOOD PRESSURE: 70 MMHG | OXYGEN SATURATION: 96 % | SYSTOLIC BLOOD PRESSURE: 107 MMHG | WEIGHT: 117 LBS | RESPIRATION RATE: 18 BRPM

## 2024-01-11 DIAGNOSIS — H50.9 STRABISMUS: Primary | ICD-10-CM

## 2024-01-11 DIAGNOSIS — Z01.818 PRE-OP EVALUATION: ICD-10-CM

## 2024-01-11 PROCEDURE — 99213 OFFICE O/P EST LOW 20 MIN: CPT

## 2024-01-11 ASSESSMENT — PATIENT HEALTH QUESTIONNAIRE - PHQ9
SUM OF ALL RESPONSES TO PHQ QUESTIONS 1-9: 0
SUM OF ALL RESPONSES TO PHQ QUESTIONS 1-9: 0
SUM OF ALL RESPONSES TO PHQ9 QUESTIONS 1 & 2: 0
SUM OF ALL RESPONSES TO PHQ QUESTIONS 1-9: 0
SUM OF ALL RESPONSES TO PHQ QUESTIONS 1-9: 0
2. FEELING DOWN, DEPRESSED OR HOPELESS: 0
1. LITTLE INTEREST OR PLEASURE IN DOING THINGS: 0

## 2024-01-11 NOTE — PROGRESS NOTES
[address] [id number]   Office [phone], Fax [phone]  Preoperative Evaluation for Melissa Ham     1/11/2024  Chief Complaint   Patient presents with    Pre-op Exam       HPI:   Melissa Ham is a 23 y.o. female with a PMH of HIV referred for evaluation by:Dr. Tyson for Pre- Op Evaluation.     Type of surgery and indication: strabismus repair  Surgery site : L eye   Anesthesia type: general   Date of procedure:  1/17/23    Review of Systems   Review of Systems  Comprehensive review of systems was negative except what is mentioned in HPI.     Inherent Risk of Surgery   Surgical risk:  Low  Low:  Cataract, breast, dental, endoscopy, superficial  Intermediate:  Orthopedic, abdominal, thoracic, carotid endarterctomy, prostate, head and neck  High:  Vascular, aortic, emergent, high risk of blood loss, anticipated prolonged    Patient Cardiac Risk Assessment   Risk Assessment using Revised Nguyễn cardiac risk index (RCRI): 0 points, 3.9% risk  High-risk type of surgery (examples include vascular surgery and any open intraperitoneal or intrathoracic procedures): No  History of ischemic heart disease (i.e. MI or a positive exercise test, current complaint of chest pain, use of nitrate therapy, or ECG with pathological Q waves): No  History of HF: No  History of cerebrovascular disease: No  Diabetes mellitus requiring treatment with insulin: No  Preoperative serum creatinine >2.0 mg/dL (177 µmol/L): No      Assessment of Cardiac Functional Status     Can perform 4 Mets? yes.     Prior cardiac evaluation:  EKG with sinus bradycardia in the past    <4 METS >4 METS   Care for self Climb a flight of stairs or a hill   Walk indoors around [facility] Walk on level ground at 4 mph   Walk 2-3 blocks on level ground (2-3 mph) Run a short distance   Light work around house (dust, dishes) Heavy work around house (scrub floors, move furniture)       Other Risk Factors    Screening for ETOH use: Done

## 2024-01-11 NOTE — PROGRESS NOTES
Chief Complaint   Patient presents with    Pre-op Exam     Vitals:    01/11/24 1555   BP: 107/70   Pulse: 86   Resp: 18   SpO2: 96%    Surgery on left eye on 01-16

## 2024-01-16 ENCOUNTER — ANESTHESIA EVENT (OUTPATIENT)
Facility: HOSPITAL | Age: 24
End: 2024-01-16
Payer: MEDICAID

## 2024-01-17 ENCOUNTER — HOSPITAL ENCOUNTER (OUTPATIENT)
Facility: HOSPITAL | Age: 24
Setting detail: OUTPATIENT SURGERY
Discharge: HOME | End: 2024-01-17
Attending: SPECIALIST | Admitting: SPECIALIST
Payer: MEDICAID

## 2024-01-17 ENCOUNTER — ANESTHESIA (OUTPATIENT)
Facility: HOSPITAL | Age: 24
End: 2024-01-17
Payer: MEDICAID

## 2024-01-17 VITALS
HEART RATE: 72 BPM | SYSTOLIC BLOOD PRESSURE: 114 MMHG | DIASTOLIC BLOOD PRESSURE: 77 MMHG | OXYGEN SATURATION: 100 % | RESPIRATION RATE: 13 BRPM | TEMPERATURE: 98.1 F

## 2024-01-17 LAB — HCG UR QL: NEGATIVE

## 2024-01-17 PROCEDURE — 7100000001 HC PACU RECOVERY - ADDTL 15 MIN: Performed by: SPECIALIST

## 2024-01-17 PROCEDURE — 3600000012 HC SURGERY LEVEL 2 ADDTL 15MIN: Performed by: SPECIALIST

## 2024-01-17 PROCEDURE — 6370000000 HC RX 637 (ALT 250 FOR IP): Performed by: SPECIALIST

## 2024-01-17 PROCEDURE — 81025 URINE PREGNANCY TEST: CPT

## 2024-01-17 PROCEDURE — 3700000001 HC ADD 15 MINUTES (ANESTHESIA): Performed by: SPECIALIST

## 2024-01-17 PROCEDURE — 6360000002 HC RX W HCPCS: Performed by: NURSE ANESTHETIST, CERTIFIED REGISTERED

## 2024-01-17 PROCEDURE — 3700000000 HC ANESTHESIA ATTENDED CARE: Performed by: SPECIALIST

## 2024-01-17 PROCEDURE — 2580000003 HC RX 258: Performed by: NURSE ANESTHETIST, CERTIFIED REGISTERED

## 2024-01-17 PROCEDURE — 2709999900 HC NON-CHARGEABLE SUPPLY: Performed by: SPECIALIST

## 2024-01-17 PROCEDURE — 2500000003 HC RX 250 WO HCPCS: Performed by: NURSE ANESTHETIST, CERTIFIED REGISTERED

## 2024-01-17 PROCEDURE — 2500000003 HC RX 250 WO HCPCS: Performed by: SPECIALIST

## 2024-01-17 PROCEDURE — 7100000000 HC PACU RECOVERY - FIRST 15 MIN: Performed by: SPECIALIST

## 2024-01-17 PROCEDURE — 3600000002 HC SURGERY LEVEL 2 BASE: Performed by: SPECIALIST

## 2024-01-17 RX ORDER — SODIUM CHLORIDE, POTASSIUM CHLORIDE, CALCIUM CHLORIDE, MAGNESIUM CHLORIDE, SODIUM ACETATE, AND SODIUM CITRATE 6.4; .75; .48; .3; 3.9; 1.7 MG/ML; MG/ML; MG/ML; MG/ML; MG/ML; MG/ML
SOLUTION IRRIGATION PRN
Status: DISCONTINUED | OUTPATIENT
Start: 2024-01-17 | End: 2024-01-17 | Stop reason: HOSPADM

## 2024-01-17 RX ORDER — ONDANSETRON 2 MG/ML
INJECTION INTRAMUSCULAR; INTRAVENOUS PRN
Status: DISCONTINUED | OUTPATIENT
Start: 2024-01-17 | End: 2024-01-17 | Stop reason: SDUPTHER

## 2024-01-17 RX ORDER — FENTANYL CITRATE 50 UG/ML
INJECTION, SOLUTION INTRAMUSCULAR; INTRAVENOUS PRN
Status: DISCONTINUED | OUTPATIENT
Start: 2024-01-17 | End: 2024-01-17 | Stop reason: SDUPTHER

## 2024-01-17 RX ORDER — DEXAMETHASONE SODIUM PHOSPHATE 4 MG/ML
INJECTION, SOLUTION INTRA-ARTICULAR; INTRALESIONAL; INTRAMUSCULAR; INTRAVENOUS; SOFT TISSUE PRN
Status: DISCONTINUED | OUTPATIENT
Start: 2024-01-17 | End: 2024-01-17 | Stop reason: SDUPTHER

## 2024-01-17 RX ORDER — SODIUM CHLORIDE, SODIUM LACTATE, POTASSIUM CHLORIDE, CALCIUM CHLORIDE 600; 310; 30; 20 MG/100ML; MG/100ML; MG/100ML; MG/100ML
INJECTION, SOLUTION INTRAVENOUS CONTINUOUS PRN
Status: DISCONTINUED | OUTPATIENT
Start: 2024-01-17 | End: 2024-01-17 | Stop reason: SDUPTHER

## 2024-01-17 RX ORDER — DEXMEDETOMIDINE HYDROCHLORIDE 100 UG/ML
INJECTION, SOLUTION INTRAVENOUS PRN
Status: DISCONTINUED | OUTPATIENT
Start: 2024-01-17 | End: 2024-01-17 | Stop reason: SDUPTHER

## 2024-01-17 RX ORDER — PROPOFOL 10 MG/ML
INJECTION, EMULSION INTRAVENOUS PRN
Status: DISCONTINUED | OUTPATIENT
Start: 2024-01-17 | End: 2024-01-17 | Stop reason: SDUPTHER

## 2024-01-17 RX ORDER — NEOMYCIN SULFATE, POLYMYXIN B SULFATE, AND DEXAMETHASONE 3.5; 10000; 1 MG/G; [USP'U]/G; MG/G
OINTMENT OPHTHALMIC PRN
Status: DISCONTINUED | OUTPATIENT
Start: 2024-01-17 | End: 2024-01-17 | Stop reason: HOSPADM

## 2024-01-17 RX ORDER — MIDAZOLAM HYDROCHLORIDE 1 MG/ML
INJECTION INTRAMUSCULAR; INTRAVENOUS PRN
Status: DISCONTINUED | OUTPATIENT
Start: 2024-01-17 | End: 2024-01-17 | Stop reason: SDUPTHER

## 2024-01-17 RX ORDER — PHENYLEPHRINE HYDROCHLORIDE 25 MG/ML
SOLUTION/ DROPS OPHTHALMIC PRN
Status: DISCONTINUED | OUTPATIENT
Start: 2024-01-17 | End: 2024-01-17 | Stop reason: HOSPADM

## 2024-01-17 RX ORDER — GLYCOPYRROLATE 0.2 MG/ML
INJECTION INTRAMUSCULAR; INTRAVENOUS PRN
Status: DISCONTINUED | OUTPATIENT
Start: 2024-01-17 | End: 2024-01-17 | Stop reason: SDUPTHER

## 2024-01-17 RX ADMIN — DEXAMETHASONE SODIUM PHOSPHATE 4 MG: 4 INJECTION, SOLUTION INTRAMUSCULAR; INTRAVENOUS at 13:00

## 2024-01-17 RX ADMIN — MIDAZOLAM 2 MG: 1 INJECTION INTRAMUSCULAR; INTRAVENOUS at 12:47

## 2024-01-17 RX ADMIN — SODIUM CHLORIDE, POTASSIUM CHLORIDE, SODIUM LACTATE AND CALCIUM CHLORIDE: 600; 310; 30; 20 INJECTION, SOLUTION INTRAVENOUS at 12:47

## 2024-01-17 RX ADMIN — PROPOFOL 100 MG: 10 INJECTION, EMULSION INTRAVENOUS at 12:50

## 2024-01-17 RX ADMIN — DEXMEDETOMIDINE HYDROCHLORIDE 20 MCG: 100 INJECTION, SOLUTION, CONCENTRATE INTRAVENOUS at 12:47

## 2024-01-17 RX ADMIN — GLYCOPYRROLATE 0.2 MG: 0.2 INJECTION INTRAMUSCULAR; INTRAVENOUS at 12:55

## 2024-01-17 RX ADMIN — FENTANYL CITRATE 25 MCG: 50 INJECTION INTRAMUSCULAR; INTRAVENOUS at 12:50

## 2024-01-17 RX ADMIN — ONDANSETRON 4 MG: 2 INJECTION INTRAMUSCULAR; INTRAVENOUS at 13:00

## 2024-01-17 ASSESSMENT — PAIN SCALES - GENERAL: PAINLEVEL_OUTOF10: 0

## 2024-01-17 NOTE — DISCHARGE INSTRUCTIONS
Please follow up in the eye clinic 1/22/24 at 3:30 pm   There are no restrictions for the patient (ie bathing, diet etc)  No dressing is needed, mild bloody tears are common for a few hours  Please use the ointment 3x a day to the left eyes for 5 days - only a small amount is needed  You may use OTC tylenol and/or ibuprofen as needed for pain according to instructions on the box - also [facility] address and phone #  [phone] [address] [facility], [address] [address]

## 2024-01-17 NOTE — PERIOP NOTE
I have reviewed discharge instructions with the patient's friend Taylor.  The  patient's friend Taylor verbalized understanding. All questions addressed at this time. A paper copy of these instructions have been given to the patient to take home.

## 2024-01-17 NOTE — ANESTHESIA PRE PROCEDURE
opening: > = 3 FB   Dental: normal exam         Pulmonary:Negative Pulmonary ROS breath sounds clear to auscultation                             Cardiovascular:Negative CV ROS  Exercise tolerance: good (>4 METS)          Rhythm: regular  Rate: normal                    Neuro/Psych:   Negative Neuro/Psych ROS              GI/Hepatic/Renal: Neg GI/Hepatic/Renal ROS           ROS comment: HIV.   Endo/Other: Negative Endo/Other ROS                    Abdominal: normal exam            Vascular: negative vascular ROS.         Other Findings:       Anesthesia Plan      general     ASA 2       Induction: intravenous.    MIPS: Postoperative opioids intended and Prophylactic antiemetics administered.  Anesthetic plan and risks discussed with patient.    Use of blood products discussed with patient whom consented to blood products.    Plan discussed with CRNA.                Yariel Crandall MD   1/17/2024

## 2024-01-17 NOTE — OP NOTE
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 The R/B/A to surgery to explained to the patient including the possibility of undercorrection/overcorrection, need for further surgery, injury to the eye, loss of vision and infection and she agreed to proceed.      The patient was then brought back to the operative suite where general anesthesia was induced and the left eyes were sterile prepped and drapped. After placement of a lid speculum an incision was made through conjunctiva and tenons in the inferonasal quadrant. The medial muscle was then isolated on a small then large muscle hook and the conjunctiva pulled overtop after freeing it fro the scar tissue and finding it 4.0 mm back from the original insertion. The muscle was then freed of its surrounding tenon's attachments and secured at it's insertion with 6-0 vicryl suture. The muscle was then carefully excised from the globe and allowed to recess 3.5. mm from its original insertion and then was re-sutured to the globe with 2 partial thickness scleral passes. Once it was checked for accuracy of placement, the conjunctiva was closed over top and maxitrol ointment placed into the left eye.      Once we were done, the patient had anesthesia reversed and was taken to the PACU in stable condition. Post op care instructions were given to the patient including the instruction to the call the eye clinic with any emergency concerns. The patient is to follow up within the next week in

## 2024-01-17 NOTE — ANESTHESIA POSTPROCEDURE EVALUATION
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    Vega Phase I: Vega Score: 10    Vega Phase II:      Anesthesia Post Evaluation    Patient location during evaluation: PACU  Level of consciousness: awake  Airway patency: patent  Nausea & Vomiting: no nausea  Cardiovascular status: hemodynamically stable  Respiratory status: acceptable  Hydration status: stable  Multimodal analgesia pain management approach  Pain management: adequate    No notable events documented.

## 2024-02-24 ENCOUNTER — HOSPITAL ENCOUNTER (EMERGENCY)
Facility: HOSPITAL | Age: 24
Discharge: HOME | End: 2024-02-24
Attending: FAMILY MEDICINE
Payer: MEDICAID

## 2024-02-24 VITALS
SYSTOLIC BLOOD PRESSURE: 119 MMHG | WEIGHT: 115 LBS | BODY MASS INDEX: 18.05 KG/M2 | RESPIRATION RATE: 16 BRPM | TEMPERATURE: 98.2 F | HEART RATE: 74 BPM | OXYGEN SATURATION: 100 % | DIASTOLIC BLOOD PRESSURE: 73 MMHG | HEIGHT: 67 IN

## 2024-02-24 DIAGNOSIS — H02.843 SWELLING OF EYELID, RIGHT: ICD-10-CM

## 2024-02-24 DIAGNOSIS — S05.01XA ABRASION OF RIGHT CORNEA, INITIAL ENCOUNTER: Primary | ICD-10-CM

## 2024-02-24 PROCEDURE — 6370000000 HC RX 637 (ALT 250 FOR IP): Performed by: FAMILY MEDICINE

## 2024-02-24 PROCEDURE — 99283 EMERGENCY DEPT VISIT LOW MDM: CPT

## 2024-02-24 RX ORDER — ERYTHROMYCIN 5 MG/G
OINTMENT OPHTHALMIC
Qty: 1 EACH | Refills: 0 | Status: SHIPPED | OUTPATIENT
Start: 2024-02-24

## 2024-02-24 RX ORDER — TETRACAINE HYDROCHLORIDE 5 MG/ML
1 SOLUTION OPHTHALMIC ONCE
Status: COMPLETED | OUTPATIENT
Start: 2024-02-24 | End: 2024-02-24

## 2024-02-24 RX ORDER — AMOXICILLIN AND CLAVULANATE POTASSIUM 875; 125 MG/1; MG/1
1 TABLET, FILM COATED ORAL 2 TIMES DAILY
Qty: 14 TABLET | Refills: 0 | Status: SHIPPED | OUTPATIENT
Start: 2024-02-24 | End: 2024-03-02

## 2024-02-24 RX ADMIN — FLUORESCEIN SODIUM 1 MG: 1 STRIP OPHTHALMIC at 08:36

## 2024-02-24 RX ADMIN — TETRACAINE HYDROCHLORIDE 1 DROP: 5 SOLUTION OPHTHALMIC at 08:36

## 2024-02-24 ASSESSMENT — VISUAL ACUITY
OU: 20/16
OS: 20/25
OD: 20/20

## 2024-02-24 ASSESSMENT — PAIN SCALES - GENERAL: PAINLEVEL_OUTOF10: 0

## 2024-02-24 ASSESSMENT — PAIN - FUNCTIONAL ASSESSMENT: PAIN_FUNCTIONAL_ASSESSMENT: 0-10
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
## 2024-03-25 ENCOUNTER — HOSPITAL ENCOUNTER (INPATIENT)
Facility: HOSPITAL | Age: 24
LOS: 4 days | Discharge: HOME | End: 2024-03-29
Attending: PSYCHIATRY & NEUROLOGY | Admitting: PSYCHIATRY & NEUROLOGY
Payer: MEDICAID

## 2024-03-25 DIAGNOSIS — B20 HIV INFECTION, UNSPECIFIED SYMPTOM STATUS (HCC): ICD-10-CM

## 2024-03-25 DIAGNOSIS — F22 PARANOIA (HCC): ICD-10-CM

## 2024-03-25 DIAGNOSIS — F22 DELUSIONS (HCC): Primary | ICD-10-CM

## 2024-03-25 DIAGNOSIS — R44.3 HALLUCINATIONS: ICD-10-CM

## 2024-03-25 PROBLEM — F29 PSYCHOSIS, UNSPECIFIED PSYCHOSIS TYPE (HCC): Status: ACTIVE | Noted: 2024-03-25

## 2024-03-25 LAB
ALBUMIN SERPL-MCNC: 4.1 G/DL (ref 3.5–5)
ALBUMIN/GLOB SERPL: 1 (ref 1.1–2.2)
ALP SERPL-CCNC: 58 U/L (ref 45–117)
ALT SERPL-CCNC: 13 U/L (ref 12–78)
AMPHET UR QL SCN: NEGATIVE
ANION GAP SERPL CALC-SCNC: 6 MMOL/L (ref 5–15)
APPEARANCE UR: ABNORMAL
AST SERPL W P-5'-P-CCNC: 11 U/L (ref 15–37)
BACTERIA URNS QL MICRO: NEGATIVE /HPF
BARBITURATES UR QL SCN: NEGATIVE
BASOPHILS # BLD: 0 K/UL (ref 0–0.1)
BASOPHILS NFR BLD: 1 % (ref 0–1)
BENZODIAZ UR QL: NEGATIVE
BILIRUB SERPL-MCNC: 0.5 MG/DL (ref 0.2–1)
BILIRUB UR QL: NEGATIVE
BUN SERPL-MCNC: 14 MG/DL (ref 6–20)
BUN/CREAT SERPL: 15 (ref 12–20)
CA-I BLD-MCNC: 9.5 MG/DL (ref 8.5–10.1)
CANNABINOIDS UR QL SCN: POSITIVE
CHLORIDE SERPL-SCNC: 107 MMOL/L (ref 97–108)
CO2 SERPL-SCNC: 26 MMOL/L (ref 21–32)
COCAINE UR QL SCN: NEGATIVE
COLOR UR: ABNORMAL
CREAT SERPL-MCNC: 0.94 MG/DL (ref 0.55–1.02)
DIFFERENTIAL METHOD BLD: ABNORMAL
EOSINOPHIL # BLD: 0 K/UL (ref 0–0.4)
EOSINOPHIL NFR BLD: 0 % (ref 0–7)
EPITH CASTS URNS QL MICRO: ABNORMAL /LPF
ERYTHROCYTE [DISTWIDTH] IN BLOOD BY AUTOMATED COUNT: 12.8 % (ref 11.5–14.5)
ETHANOL SERPL-MCNC: <10 MG/DL (ref 0–0.08)
FLUAV RNA SPEC QL NAA+PROBE: NOT DETECTED
FLUBV RNA SPEC QL NAA+PROBE: NOT DETECTED
GLOBULIN SER CALC-MCNC: 4 G/DL (ref 2–4)
GLUCOSE SERPL-MCNC: 92 MG/DL (ref 65–100)
GLUCOSE UR STRIP.AUTO-MCNC: NEGATIVE MG/DL
HCG, URINE, POC: NEGATIVE
HCT VFR BLD AUTO: 35.4 % (ref 35–47)
HGB BLD-MCNC: 12 G/DL (ref 11.5–16)
HGB UR QL STRIP: NEGATIVE
IMM GRANULOCYTES # BLD AUTO: 0 K/UL (ref 0–0.04)
IMM GRANULOCYTES NFR BLD AUTO: 0 % (ref 0–0.5)
KETONES UR QL STRIP.AUTO: 20 MG/DL
LEUKOCYTE ESTERASE UR QL STRIP.AUTO: NEGATIVE
LYMPHOCYTES # BLD: 2 K/UL (ref 0.8–3.5)
LYMPHOCYTES NFR BLD: 45 % (ref 12–49)
Lab: ABNORMAL
Lab: NORMAL
MAGNESIUM SERPL-MCNC: 2 MG/DL (ref 1.6–2.4)
MCH RBC QN AUTO: 33.8 PG (ref 26–34)
MCHC RBC AUTO-ENTMCNC: 33.9 G/DL (ref 30–36.5)
MCV RBC AUTO: 99.7 FL (ref 80–99)
METHADONE UR QL: NEGATIVE
MONOCYTES # BLD: 0.3 K/UL (ref 0–1)
MONOCYTES NFR BLD: 7 % (ref 5–13)
MUCOUS THREADS URNS QL MICRO: ABNORMAL /LPF
NEGATIVE QC PASS/FAIL: NORMAL
NEUTS SEG # BLD: 2.1 K/UL (ref 1.8–8)
NEUTS SEG NFR BLD: 47 % (ref 32–75)
NITRITE UR QL STRIP.AUTO: NEGATIVE
NRBC # BLD: 0 K/UL (ref 0–0.01)
NRBC BLD-RTO: 0 PER 100 WBC
OPIATES UR QL: NEGATIVE
PCP UR QL: NEGATIVE
PH UR STRIP: 6 (ref 5–8)
PLATELET # BLD AUTO: 252 K/UL (ref 150–400)
PMV BLD AUTO: 9.7 FL (ref 8.9–12.9)
POSITIVE QC PASS/FAIL: NORMAL
POTASSIUM SERPL-SCNC: 3.6 MMOL/L (ref 3.5–5.1)
PROT SERPL-MCNC: 8.1 G/DL (ref 6.4–8.2)
PROT UR STRIP-MCNC: 30 MG/DL
RBC # BLD AUTO: 3.55 M/UL (ref 3.8–5.2)
RBC #/AREA URNS HPF: ABNORMAL /HPF (ref 0–5)
SARS-COV-2 RNA RESP QL NAA+PROBE: NOT DETECTED
SODIUM SERPL-SCNC: 139 MMOL/L (ref 136–145)
SP GR UR REFRACTOMETRY: 1.02 (ref 1–1.03)
URINE CULTURE IF INDICATED: ABNORMAL
UROBILINOGEN UR QL STRIP.AUTO: 0.1 EU/DL (ref 0.1–1)
WBC # BLD AUTO: 4.5 K/UL (ref 3.6–11)
WBC URNS QL MICRO: ABNORMAL /HPF (ref 0–4)

## 2024-03-25 PROCEDURE — 99285 EMERGENCY DEPT VISIT HI MDM: CPT

## 2024-03-25 PROCEDURE — 80053 COMPREHEN METABOLIC PANEL: CPT

## 2024-03-25 PROCEDURE — 85025 COMPLETE CBC W/AUTO DIFF WBC: CPT

## 2024-03-25 PROCEDURE — 1240000000 HC EMOTIONAL WELLNESS R&B

## 2024-03-25 PROCEDURE — 87636 SARSCOV2 & INF A&B AMP PRB: CPT

## 2024-03-25 PROCEDURE — 83735 ASSAY OF MAGNESIUM: CPT

## 2024-03-25 PROCEDURE — 82077 ASSAY SPEC XCP UR&BREATH IA: CPT

## 2024-03-25 PROCEDURE — 86361 T CELL ABSOLUTE COUNT: CPT

## 2024-03-25 PROCEDURE — 80307 DRUG TEST PRSMV CHEM ANLYZR: CPT

## 2024-03-25 PROCEDURE — 36415 COLL VENOUS BLD VENIPUNCTURE: CPT

## 2024-03-25 PROCEDURE — 6370000000 HC RX 637 (ALT 250 FOR IP)

## 2024-03-25 PROCEDURE — 87536 HIV-1 QUANT&REVRSE TRNSCRPJ: CPT

## 2024-03-25 PROCEDURE — 81001 URINALYSIS AUTO W/SCOPE: CPT

## 2024-03-25 RX ORDER — MAGNESIUM HYDROXIDE/ALUMINUM HYDROXICE/SIMETHICONE 120; 1200; 1200 MG/30ML; MG/30ML; MG/30ML
30 SUSPENSION ORAL EVERY 6 HOURS PRN
Status: DISCONTINUED | OUTPATIENT
Start: 2024-03-25 | End: 2024-03-29 | Stop reason: HOSPADM

## 2024-03-25 RX ORDER — LORAZEPAM 0.5 MG/1
0.5 TABLET ORAL
Status: COMPLETED | OUTPATIENT
Start: 2024-03-25 | End: 2024-03-25

## 2024-03-25 RX ORDER — TRAZODONE HYDROCHLORIDE 50 MG/1
50 TABLET ORAL NIGHTLY PRN
Status: DISCONTINUED | OUTPATIENT
Start: 2024-03-25 | End: 2024-03-29 | Stop reason: HOSPADM

## 2024-03-25 RX ORDER — HYDROXYZINE 50 MG/1
50 TABLET, FILM COATED ORAL 3 TIMES DAILY PRN
Status: DISCONTINUED | OUTPATIENT
Start: 2024-03-25 | End: 2024-03-29 | Stop reason: HOSPADM

## 2024-03-25 RX ORDER — ACETAMINOPHEN 325 MG/1
650 TABLET ORAL EVERY 4 HOURS PRN
Status: DISCONTINUED | OUTPATIENT
Start: 2024-03-25 | End: 2024-03-29 | Stop reason: HOSPADM

## 2024-03-25 RX ADMIN — LORAZEPAM 0.5 MG: 0.5 TABLET ORAL at 13:07

## 2024-03-25 ASSESSMENT — LIFESTYLE VARIABLES
HOW MANY STANDARD DRINKS CONTAINING ALCOHOL DO YOU HAVE ON A TYPICAL DAY: PATIENT DOES NOT DRINK
HOW OFTEN DO YOU HAVE A DRINK CONTAINING ALCOHOL: NEVER

## 2024-03-25 ASSESSMENT — PAIN DESCRIPTION - LOCATION: LOCATION: BACK

## 2024-03-25 ASSESSMENT — PAIN - FUNCTIONAL ASSESSMENT: PAIN_FUNCTIONAL_ASSESSMENT: 0-10

## 2024-03-25 ASSESSMENT — PAIN SCALES - GENERAL: PAINLEVEL_OUTOF10: 6
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
## 2024-03-25 NOTE — ED NOTES
Patient belongings taken home with grandmother. Nurse's station phone number provided, escorted out of ED while patient taken up to 2s with security and nurse.

## 2024-03-25 NOTE — BSMART NOTE
Access request for additional bld test to include  Viral load and CD4 count for HIV.  [facility] notified

## 2024-03-25 NOTE — ED PROVIDER NOTES
[facility] EMERGENCY DEPT  EMERGENCY DEPARTMENT HISTORY AND PHYSICAL EXAM      Date: 3/25/2024  Patient Name: Melissa Ham  MRN: [id number]  YOB: 2000  Date of evaluation: 3/25/2024  Provider: Kenya Chaudhary PA-C   Note Started: 11:30 AM EDT 3/25/24    HISTORY OF PRESENT ILLNESS     Chief Complaint   Patient presents with    Mental Health Problem       History Provided By: Patient    HPI: Melissa Ham is a 24 y.o. female with PMH as described below who presents ambulatory to the ED with her grandmother for hallucinations, paranoia, and delusions that began 3 days ago.  Patient states that people are \"talking in code\" in her head, making confessions that she does not want to hear, and conducting \"spiritual warfare\" on her body. She denies the voices instructing her to do anything. She additionally reports visual hallucinations, stating she sees her coworkers eyes go missing and come back. She works at [employer]. She denies SI and HI to provider, however, reportedly told the triage nurse that she occasionally has HI thoughts but no SI.  Patient's grandmother reports that she has been frantic at home, not sleeping, and highly concerned that cars driving by the house are the police trying to hurt her and her family. Grandmother also reports that patient keeps bringing up past events, believing they are happening currently. Grandmother reports that patient's mother believes that she may be using Shrooms or other drugs. Patient states she occasionally smokes marijuana but does not use any other drugs.  Denies alcohol use. She denies recent life stressors, reporting the only stress she has is her love life. Patient reports that she was admitted to a psychiatric facility years ago for suicidal ideations.  She denies previous suicide attempts or self-harm. She denies formal psychiatric diagnoses and does not currently take any psychiatric medications.  She is not followed by psychiatric team.  She has no physical  medications:  Medications   LORazepam (ATIVAN) tablet 0.5 mg (0.5 mg Oral Given 3/25/24 2739)       CONSULTS: See ED Course/MDM for further details.  IP CONSULT TO BSMART     Social Determinants affecting Diagnosis/Treatment: None    Smoking Cessation: Not Applicable    PROCEDURES   Unless otherwise noted above, none.  Procedures      CRITICAL CARE TIME   Patient does not meet Critical Care Time, 0 minutes    ED IMPRESSION     1. Delusions (HCC)    2. Paranoia (HCC)    3. Hallucinations          DISPOSITION/PLAN   DISPOSITION Admitted 03/25/2024 03:15:51 PM    Admit Note: Pt is being admitted by Dr. Hollis. The results of their tests and reason(s) for their admission have been discussed with pt and/or available family. They convey agreement and understanding for the need to be admitted and for the admission diagnosis.     PATIENT REFERRED TO:  No follow-up provider specified.      DISCHARGE MEDICATIONS:     Medication List        ASK your doctor about these medications      Biktarvy -25 MG Tabs per tablet  Generic drug: bictegravir-emtricitab-tenofovir alafenamide  Take 1 tablet by mouth daily     erythromycin 5 MG/GM ophthalmic ointment  Commonly known as: ROMYCIN  Apply 1 cm ribbon to affected eye every 4 hours while awake x7 days                DISCONTINUED MEDICATIONS:  Current Discharge Medication List          I am the Primary Clinician of Record: Kenya Chaudhary PA-C (electronically signed)    (Please note that parts of this dictation were completed with voice recognition software. Quite often unanticipated grammatical, syntax, homophones, and other interpretive errors are inadvertently transcribed by the computer software. Please disregards these errors. Please excuse any errors that have escaped final proofreading.)     Kenya Chaudhary PA-C  03/25/24 2730

## 2024-03-25 NOTE — ED NOTES
Pt in room tearful hugging grandmother, pt then talking about telling her mom things she doesn't know if was true, pt given PRN to help her calm down grandmother asking for help getting pt to sit back down.

## 2024-03-25 NOTE — ED TRIAGE NOTES
Patient presents with her mother who states she is hallucinating.  Patient states she is exhausted and cannot sleep.  People are \"talking in code\" in her head but are not telling her to do anything.  States she occasionally experiences HI.  Denies SI.

## 2024-03-26 LAB
BASOPHILS # BLD AUTO: 0 X10E3/UL (ref 0–0.2)
BASOPHILS NFR BLD AUTO: 1 %
CD3+CD4+ CELLS # BLD: 916 /UL (ref 359–1519)
CD3+CD4+ CELLS NFR BLD: 43.6 % (ref 30.8–58.5)
EOSINOPHIL # BLD AUTO: 0.1 X10E3/UL (ref 0–0.4)
EOSINOPHIL NFR BLD AUTO: 1 %
ERYTHROCYTE [DISTWIDTH] IN BLOOD BY AUTOMATED COUNT: 12.8 % (ref 11.7–15.4)
HCT VFR BLD AUTO: 32.9 % (ref 34–46.6)
HGB BLD-MCNC: 11.4 G/DL (ref 11.1–15.9)
HIV1 RNA # SERPL NAA+PROBE: <20 COPIES/ML
HIV1 RNA SERPL NAA+PROBE-LOG#: NORMAL LOG10COPY/ML
IMM GRANULOCYTES # BLD: 0 X10E3/UL (ref 0–0.1)
IMM GRANULOCYTES NFR BLD: 0 %
IMMATURE CELLS: ABNORMAL
LYMPHOCYTES # BLD AUTO: 2.1 X10E3/UL (ref 0.7–3.1)
LYMPHOCYTES NFR BLD AUTO: 40 %
MCH RBC QN AUTO: 33.9 PG (ref 26.6–33)
MCHC RBC AUTO-ENTMCNC: 34.7 G/DL (ref 31.5–35.7)
MCV RBC AUTO: 98 FL (ref 79–97)
MONOCYTES # BLD AUTO: 0.3 X10E3/UL (ref 0.1–0.9)
MONOCYTES NFR BLD AUTO: 5 %
MORPHOLOGY BLD-IMP: ABNORMAL
NEUTROPHILS # BLD AUTO: 2.8 X10E3/UL (ref 1.4–7)
NEUTROPHILS NFR BLD AUTO: 53 %
NRBC BLD AUTO-RTO: ABNORMAL %
PLATELET # BLD AUTO: 263 X10E3/UL (ref 150–450)
RBC # BLD AUTO: 3.36 X10E6/UL (ref 3.77–5.28)
WBC # BLD AUTO: 5.2 X10E3/UL (ref 3.4–10.8)

## 2024-03-26 PROCEDURE — 1240000000 HC EMOTIONAL WELLNESS R&B

## 2024-03-26 PROCEDURE — 6370000000 HC RX 637 (ALT 250 FOR IP): Performed by: PSYCHIATRY & NEUROLOGY

## 2024-03-26 RX ORDER — SERTRALINE HYDROCHLORIDE 25 MG/1
25 TABLET, FILM COATED ORAL DAILY
Status: DISCONTINUED | OUTPATIENT
Start: 2024-03-26 | End: 2024-03-29 | Stop reason: HOSPADM

## 2024-03-26 RX ORDER — QUETIAPINE FUMARATE 25 MG/1
25 TABLET, FILM COATED ORAL NIGHTLY
Status: DISCONTINUED | OUTPATIENT
Start: 2024-03-26 | End: 2024-03-29 | Stop reason: HOSPADM

## 2024-03-26 RX ADMIN — QUETIAPINE FUMARATE 25 MG: 25 TABLET ORAL at 20:56

## 2024-03-26 RX ADMIN — BICTEGRAVIR SODIUM, EMTRICITABINE, AND TENOFOVIR ALAFENAMIDE FUMARATE 1 TABLET: 50; 200; 25 TABLET ORAL at 08:27

## 2024-03-26 RX ADMIN — SERTRALINE HYDROCHLORIDE 25 MG: 25 TABLET ORAL at 16:21

## 2024-03-26 ASSESSMENT — SLEEP AND FATIGUE QUESTIONNAIRES
AVERAGE NUMBER OF SLEEP HOURS: 5
DO YOU USE A SLEEP AID: COMMENT
DO YOU HAVE DIFFICULTY SLEEPING: YES
SLEEP PATTERN: DIFFICULTY FALLING ASLEEP;EARLY AWAKENING
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
## 2024-03-26 NOTE — CARE COORDINATION
03/26/24 1314   ITP   Date of Plan 03/26/24   Date of Next Review 04/02/24   Primary Diagnosis Code Psychosis, unspecified psychosis type (HCC)   Barriers to Treatment Need for psychoeducation;Other (comment);Psychiatric symptom (comment)  (lack of insight, substance use, health concerns and childhood trauma)   Strengths Incorporated in Plan Verbal;Postive outlook;Family supports   Plan of Care   Long Term Goal (LTG) Stated in patient/guardian terms \"to work on better relationships\"   Short Term Goal 1   Short Term Goal 1 Client will learn and demonstrate improved bourndaries   Baseline Functioning Pt reported that she focuses on others and does not say no   Target Pt would like to learn healthy boundaries with family   Objectives Client will participate in individual therapy;Client will participate in group therapy   Intervention 1 Acknowledge client strengths;Indvidual therapy   Frequency daily   Measured by Behavioral data;Self report   Staff Responsible [facility] staff;Clinical staff   Intervention 2 Group therapy   Frequency daily   Measured by Behavioral data;Self report   Staff Responsible P staff;Clinical staff   Intervention 3 Monitor medications   Frequency daily   Measured by Behavioral data;Self report   Staff Responsible Clinical staff;[facility] staff   STG Goal 1 Status: Patient Appears to be  Progressing toward treatment plan goal   Short Term Goal 2   Short Term Goal 2 Client will learn and demonstrate improved communication skills   Baseline Functioning Pt reported that her and her family have not been communicating well   Target Pt would like to communicate in a more positive way with her family about her past   Objectives Client will participate in individual therapy;Client will participate in group therapy   Intervention 1 Acknowledge client strengths;Indvidual therapy   Frequency daily   Measured by Behavioral data;Self report   Staff Responsible [facility] staff;Clinical staff   Intervention 2 Group therapy

## 2024-03-26 NOTE — GROUP NOTE
Group Therapy Note    Date: 3/26/2024    Group Start Time: 1315  Group End Time: 1400  Group Topic: Process Group - Inpatient    SSR 2 [facility] ACUTE    Mackenzie Briscoe        Group Therapy Note  Process group was focused on healthy boundaries. Writer provided an informational sheet on boundary styles and how to create healthy boundaries. Most of the pts interacted well but were slightly guarded. During group writer asked a pt to please step out of the group if she continues to be disruptive. Other pts appeared to be uncomfortable but after talking about communication and attachment styles they appeared to communicate more.   Attendees: 3-9       Patient's Goal:  \"to work on me and go home\"    Notes:  Pt presented with a calm affect. She interacted well with others and provided positive feedback. Pt was able to identify her boundaries being porous and tends to be \"clingy\" in relationships. Pt processed that her attachment style was anxious and received feedback from others on how attachment develops.     Status After Intervention:  Improved    Participation Level: Active Listener and Interactive    Participation Quality: Appropriate, Attentive, and Sharing      Speech:  normal      Thought Process/Content: Logical  Linear      Affective Functioning: Congruent      Mood:  \"cold but good\"      Level of consciousness:  Alert, Oriented x4, and Attentive      Response to Learning: Able to verbalize current knowledge/experience, Able to verbalize/acknowledge new learning, Able to retain information, Capable of insight, and Progressing to goal      Endings: None Reported    Modes of Intervention: Education, Support, Socialization, Exploration, and Limit-setting      Discipline Responsible: /Counselor      Signature:  Mackenzie Briscoe

## 2024-03-26 NOTE — BH NOTE
DAY SHIFT    Pt up ad mitchel on unit. Pt presents with flat affect and depressed mood although pt denies depression. Pt denies SI/HI. Pt denies AVH. Pt is guarded and states that she has had a hard life. Pt is calm and cooperative. Pt is pleasant and soft spoken. Pt takes medication as scheduled without difficulty. Close observations continued to ensure pt safety.

## 2024-03-26 NOTE — CONSULTS
Hospitalist Consultation Note    NAME:  Melissa Ham   :   2000   MRN:   [id number]     ATTENDING: Tal Hollis MD  PCP:  Jean Adair MD    Date/Time:  3/26/2024 6:56 PM      Recommendations/Plan:       Principal Problem:    Psychosis, unspecified psychosis type (HCC)  Resolved Problems:    * No resolved hospital problems. *   Hiv  gerd        Code Status:   DVT Prophylaxis: ambulatory          Subjective:   REQUESTING PHYSICIAN:  REASON FOR CONSULT:      Melissa is a 24 y.o.   female who I was asked to see for            Past Medical History:   Diagnosis Date    HIV (human immunodeficiency virus infection) (HCC)       Past Surgical History:   Procedure Laterality Date    EYE MUSCLE SURGERY Left 2024    LEFT EYE MUSCLE REPAIR performed by Navid Tyson MD at [facility] AMBULATORY OR    EYE SURGERY      UPPER GASTROINTESTINAL ENDOSCOPY N/A 2023    EGD BIOPSY performed by Magali Moss MD at [facility] ENDOSCOPY     Social History     Tobacco Use    Smoking status: Never     Passive exposure: Never    Smokeless tobacco: Never   Substance Use Topics    Alcohol use: Never      No family history on file.    No Known Allergies   Prior to Admission medications    Medication Sig Start Date End Date Taking? Authorizing Provider   erythromycin (ROMYCIN) 5 MG/GM ophthalmic ointment Apply 1 cm ribbon to affected eye every 4 hours while awake x7 days 24   Mark Anderson,    bictegravir-emtricitab-tenofovir alafenamide (BIKTARVY) -25 MG TABS per tablet Take 1 tablet by mouth daily 23   Jay Acuna MD       REVIEW OF SYSTEMS:     Total of 12 systems reviewed as follows:   I am not able to complete the review of systems because:   The patient is intubated and sedated    The patient has altered mental status due to his acute medical problems    The patient has baseline aphasia from prior stroke(s)    The patient has baseline dementia and is not reliable historian                  POSITIVE= underlined text  Negative = text not underlined  General:  fever, chills, sweats, generalized weakness, weight loss/gain,      loss of appetite   Eyes:    blurred vision, eye pain, loss of vision, double vision  ENT:    rhinorrhea, pharyngitis   Respiratory:   cough, sputum production, SOB, wheezing, HOLT, pleuritic pain   Cardiology:   chest pain, palpitations, orthopnea, PND, edema, syncope   Gastrointestinal:  abdominal pain , N/V, dysphagia, diarrhea, constipation, bleeding   Genitourinary:  frequency, urgency, dysuria, hematuria, incontinence   Muskuloskeletal :  arthralgia, myalgia   Hematology:  easy bruising, nose or gum bleeding, lymphadenopathy   Dermatological: rash, ulceration, pruritis   Endocrine:   hot flashes or polydipsia   Neurological:  headache, dizziness, confusion, focal weakness, paresthesia,     Speech difficulties, memory loss, gait disturbance  Psychological: Feelings of anxiety, depression, agitation    Objective:   VITALS:    /72   Pulse (!) 102   Temp 98.3 °F (36.8 °C) (Oral)   Resp 20   Ht 1.702 m (5' 7\")   Wt 54.4 kg (120 lb)   SpO2 100%   BMI 18.79 kg/m²   Temp (24hrs), Av.6 °F (37 °C), Min:98.3 °F (36.8 °C), Max:98.9 °F (37.2 °C)      PHYSICAL EXAM:   General:    Alert, cooperative, no distress, appears stated age.     HEENT: Atraumatic, anicteric sclerae, pink conjunctivae     No oral ulcers, mucosa moist, throat clear  Neck:  Supple, symmetrical,  thyroid: non tender  Lungs:   Clear to auscultation bilaterally.  No Wheezing or Rhonchi. No rales.  Chest wall:  No tenderness  No Accessory muscle use.  Heart:   Regular  rhythm,  No  murmur   No edema  Abdomen:   Soft, non-tender. Not distended.  Bowel sounds normal  Extremities: No cyanosis.  No clubbing  Skin:     Not pale.  Not Jaundiced  No rashes   Psych:  Good insight.  Not depressed.  Not anxious or agitated.  Neurologic: EOMs intact. No facial asymmetry. No aphasia or slurred speech.

## 2024-03-26 NOTE — PLAN OF CARE
Problem: Anxiety  Goal: Will report anxiety at manageable levels  Description: INTERVENTIONS:  1. Administer medication as ordered  2. Teach and rehearse alternative coping skills  3. Provide emotional support with 1:1 interaction with staff  Outcome: Not Progressing     Problem: Coping  Goal: Pt/Family able to verbalize concerns and demonstrate effective coping strategies  Description: INTERVENTIONS:  1. Assist patient/family to identify coping skills, available support systems and cultural and spiritual values  2. Provide emotional support, including active listening and acknowledgement of concerns of patient and caregivers  3. Reduce environmental stimuli, as able  4. Instruct patient/family in relaxation techniques, as appropriate  5. Assess for spiritual pain/suffering and initiate Spiritual Care, Psychosocial Clinical Specialist consults as needed  Outcome: Not Progressing     Problem: Decision Making  Goal: Pt/Family able to effectively weigh alternatives and participate in decision making related to treatment and care  Description: INTERVENTIONS:  1. Determine when there are differences between patient's view, family's view, and healthcare provider's view of condition  2. Facilitate patient and family articulation of goals for care  3. Help patient and family identify pros/cons of alternative solutions  4. Provide information as requested by patient/family  5. Respect patient/family right to receive or not to receive information  6. Serve as a liaison between patient and family and health care team  7. Initiate Consults from Ethics, Palliative Care or initiate Family Care Conference as is appropriate  Outcome: Not Progressing     Problem: Confusion  Goal: Confusion, delirium, dementia, or psychosis is improved or at baseline  Description: INTERVENTIONS:  1. Assess for possible contributors to thought disturbance, including medications, impaired vision or hearing, underlying metabolic abnormalities,
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
## 2024-03-26 NOTE — BH NOTE
PSA PART II ADDITIONAL INFORMATION        Access To Fire Arms: No    Substance Use: Yes    Last Use: 3-25-24    Type of Substance: marijuana    Frequency of Use: 2x daily    Request to See : No    If yes, notified : No    Guardian:No    Guardian Contact:Pt is her own legal guardian     Release of Information Signed: No    Release of Information Signed For: Pt denied     Goal: \"to work on better relationships\"

## 2024-03-27 LAB
CK SERPL-CCNC: 76 U/L (ref 26–192)
EKG ATRIAL RATE: 80 BPM
EKG DIAGNOSIS: NORMAL
EKG P AXIS: 44 DEGREES
EKG P-R INTERVAL: 134 MS
EKG Q-T INTERVAL: 430 MS
EKG QRS DURATION: 84 MS
EKG QTC CALCULATION (BAZETT): 495 MS
EKG R AXIS: 54 DEGREES
EKG T AXIS: 58 DEGREES
EKG VENTRICULAR RATE: 80 BPM
TROPONIN I SERPL HS-MCNC: <4 NG/L (ref 0–51)

## 2024-03-27 PROCEDURE — 93005 ELECTROCARDIOGRAM TRACING: CPT | Performed by: PSYCHIATRY & NEUROLOGY

## 2024-03-27 PROCEDURE — 82550 ASSAY OF CK (CPK): CPT

## 2024-03-27 PROCEDURE — 6370000000 HC RX 637 (ALT 250 FOR IP): Performed by: PSYCHIATRY & NEUROLOGY

## 2024-03-27 PROCEDURE — 84484 ASSAY OF TROPONIN QUANT: CPT

## 2024-03-27 PROCEDURE — 1240000000 HC EMOTIONAL WELLNESS R&B

## 2024-03-27 PROCEDURE — 36415 COLL VENOUS BLD VENIPUNCTURE: CPT

## 2024-03-27 RX ORDER — ONDANSETRON 4 MG/1
4 TABLET, ORALLY DISINTEGRATING ORAL EVERY 6 HOURS PRN
Status: DISCONTINUED | OUTPATIENT
Start: 2024-03-27 | End: 2024-03-29 | Stop reason: HOSPADM

## 2024-03-27 RX ADMIN — SERTRALINE HYDROCHLORIDE 25 MG: 25 TABLET ORAL at 08:34

## 2024-03-27 RX ADMIN — ACETAMINOPHEN 650 MG: 325 TABLET ORAL at 09:59

## 2024-03-27 RX ADMIN — QUETIAPINE FUMARATE 25 MG: 25 TABLET ORAL at 21:28

## 2024-03-27 RX ADMIN — ONDANSETRON 4 MG: 4 TABLET, ORALLY DISINTEGRATING ORAL at 08:38

## 2024-03-27 RX ADMIN — BICTEGRAVIR SODIUM, EMTRICITABINE, AND TENOFOVIR ALAFENAMIDE FUMARATE 1 TABLET: 50; 200; 25 TABLET ORAL at 08:34

## 2024-03-27 RX ADMIN — HYDROXYZINE HYDROCHLORIDE 50 MG: 50 TABLET, FILM COATED ORAL at 02:30

## 2024-03-27 RX ADMIN — TRAZODONE HYDROCHLORIDE 50 MG: 50 TABLET ORAL at 02:30

## 2024-03-27 NOTE — GROUP NOTE
Group Therapy Note    Date: 3/27/2024    Group Start Time: 1230  Group End Time: 1300  Group Topic: Process Group - Inpatient    SSR 2 BEHA [facility] ACUTE    Desirae Flores        Group Therapy Note: This writer facilitated a group where the emotion of anxiety was discussed along with it's triggers and positive coping skills.     Attendees: 3       Patient's Goal:  to attend groups    Notes:  Pt was encouraged to attend but did not.         Signature:  Desirae Flores

## 2024-03-27 NOTE — BH NOTE
Behavioral Health Treatment Team Note     Patient goal(s) for today: \"to feel better\"  Treatment team focus/goals: continue medication management, group therapy, maintain ADLs and provide a safe discharge    Progress note: Pt was laying in bed and was not feeling well. She reported that she was vomiting all night and felt week. She also shared that her heart at times felt like it was fluttering like a butterfly and not a good one. Pt denied any SI/HI/Avh at the time. Pt had an EKG done last night and a medical doctor will be in to see her as well today. An inpatient level of care is needed to further stabilize the pt     LOS:  2  Expected LOS: 5-7     Insurance info/prescription coverage:  [facility]   Date of last family contact:  none as of today  Family requesting physician contact today:  No  Discharge plan:  to stabilize the pt  Guns in the home:  No   Outpatient provider(s):  will be coordinated prior to discharge    Participating treatment team members: Melissa Ham, * (assigned SW), Mackenzie Briscoe LMSW

## 2024-03-27 NOTE — GROUP NOTE
Group Therapy Note    Date: 3/26/2024    Group Start Time: 1945  Group End Time: 2030  Group Topic: Recreational    SSR 2 BH NON ACUTE    Jaqui Valdez        Group Therapy Note    Attendees: 4/8    Recreational Therapist facilitated structured leisure skills group to introduce healthy leisure skills as positive way to cope and manage mood.             Patient's Goal:  Client will learn and demonstrate improved communication skills     Notes:   Attended group. Listened to songs played during the group session. Pt was cooperative and receptive to intervention. Responded to staff with cues/prompts and encouragement to participate.      Status After Intervention:  Improved    Participation Level: Interactive    Participation Quality: Appropriate      Speech:  normal      Thought Process/Content: Logical      Affective Functioning: Congruent      Mood:  calm       Level of consciousness:  Alert      Response to Learning: Progressing to goal      Endings: None Reported    Modes of Intervention: Activity      Discipline Responsible: Recreational Therapist      Signature:  PAPA Bell

## 2024-03-27 NOTE — BH NOTE
Nursing Note    Patient is alert and oriented x 3.  She denies any SI/HI/AH/VH.  Patient denies any anxiety or depression.  Restricted affect and calm mood.  Patient participated in the evening group activity and she has been seen watching TV in the dayroom.  She voiced no complaints and accepted her medications without difficulty.   Staff will continue to monitor patient for safety.    0233  Patient complained of chest pain and states that she feels like she is having a panic attack.  Atarax 50 mg PO and Trazodone 50 mg po given.  Vital signs stable.  Dr. Hollis notified, EKG and cardiac enzymes ordered.  Staff will continue to monitor patient for safety.    0500  Phlebotomy came to draw patient labs and she pulled her arm away which presents the challenge of a dirty needle stick.  She is spitting on the floor instead of walking to the toilet and vomiting in which she is fully capable.  Melissa is HIV positive and is aware of her condition.  Respiratory therapist at bedside to perform EKG.  Staff will continue to monitor patient for safety.    0645  Patient vomited greenish/clear contents into a kvng that is placed by her bed.  She states that she cannot eat the food here and nurse informed her that needs to vomit into the toilet and not flush it instead of the floor.  Dr. Hollis notified, Zofran 4 mg PO Q 6 hours PRN ordered.  Staff will continue to monitor patient for safety.

## 2024-03-27 NOTE — BH NOTE
Pt.has been lying in bed unable to accept her diet,Zofran given for nausea,pt.states she has never felt like this before, unable to accept p.o. meds at this time, poor insight and judgement.pt.staes she can't describe how she feels,presently sleeping,no other concerns voiced, remains on close observation.

## 2024-03-27 NOTE — PLAN OF CARE
Problem: Coping  Goal: Pt/Family able to verbalize concerns and demonstrate effective coping strategies  Description: INTERVENTIONS:  1. Assist patient/family to identify coping skills, available support systems and cultural and spiritual values  2. Provide emotional support, including active listening and acknowledgement of concerns of patient and caregivers  3. Reduce environmental stimuli, as able  4. Instruct patient/family in relaxation techniques, as appropriate  5. Assess for spiritual pain/suffering and initiate Spiritual Care, Psychosocial Clinical Specialist consults as needed  Outcome: Not Progressing     Problem: Decision Making  Goal: Pt/Family able to effectively weigh alternatives and participate in decision making related to treatment and care  Description: INTERVENTIONS:  1. Determine when there are differences between patient's view, family's view, and healthcare provider's view of condition  2. Facilitate patient and family articulation of goals for care  3. Help patient and family identify pros/cons of alternative solutions  4. Provide information as requested by patient/family  5. Respect patient/family right to receive or not to receive information  6. Serve as a liaison between patient and family and health care team  7. Initiate Consults from Ethics, Palliative Care or initiate Family Care Conference as is appropriate  Outcome: Not Progressing     Problem: Drug Abuse/Detox  Goal: Will have no detox symptoms and will verbalize plan for changing drug-related behavior  Description: INTERVENTIONS:  1. Administer medication as ordered  2. Monitor physical status  3. Provide emotional support with 1:1 interaction with staff  4. Encourage  recovery focused treatment   Outcome: Not Progressing

## 2024-03-28 LAB
ALBUMIN SERPL-MCNC: 4.2 G/DL (ref 3.5–5)
ALBUMIN/GLOB SERPL: 1 (ref 1.1–2.2)
ALP SERPL-CCNC: 61 U/L (ref 45–117)
ALT SERPL-CCNC: 18 U/L (ref 12–78)
ANION GAP SERPL CALC-SCNC: 10 MMOL/L (ref 5–15)
AST SERPL W P-5'-P-CCNC: 11 U/L (ref 15–37)
BASOPHILS # BLD: 0 K/UL (ref 0–0.1)
BASOPHILS NFR BLD: 0 % (ref 0–1)
BILIRUB SERPL-MCNC: 0.9 MG/DL (ref 0.2–1)
BUN SERPL-MCNC: 15 MG/DL (ref 6–20)
BUN/CREAT SERPL: 15 (ref 12–20)
CA-I BLD-MCNC: 9.7 MG/DL (ref 8.5–10.1)
CHLORIDE SERPL-SCNC: 104 MMOL/L (ref 97–108)
CO2 SERPL-SCNC: 21 MMOL/L (ref 21–32)
CREAT SERPL-MCNC: 0.97 MG/DL (ref 0.55–1.02)
DIFFERENTIAL METHOD BLD: ABNORMAL
EKG ATRIAL RATE: 62 BPM
EKG DIAGNOSIS: NORMAL
EKG P AXIS: 56 DEGREES
EKG P-R INTERVAL: 140 MS
EKG Q-T INTERVAL: 414 MS
EKG QRS DURATION: 88 MS
EKG QTC CALCULATION (BAZETT): 420 MS
EKG R AXIS: 63 DEGREES
EKG T AXIS: 68 DEGREES
EKG VENTRICULAR RATE: 62 BPM
EOSINOPHIL # BLD: 0 K/UL (ref 0–0.4)
EOSINOPHIL NFR BLD: 0 % (ref 0–7)
ERYTHROCYTE [DISTWIDTH] IN BLOOD BY AUTOMATED COUNT: 12.5 % (ref 11.5–14.5)
GLOBULIN SER CALC-MCNC: 4.4 G/DL (ref 2–4)
GLUCOSE SERPL-MCNC: 99 MG/DL (ref 65–100)
HCT VFR BLD AUTO: 37.6 % (ref 35–47)
HGB BLD-MCNC: 13.5 G/DL (ref 11.5–16)
IMM GRANULOCYTES # BLD AUTO: 0 K/UL (ref 0–0.04)
IMM GRANULOCYTES NFR BLD AUTO: 0 % (ref 0–0.5)
LYMPHOCYTES # BLD: 2.2 K/UL (ref 0.8–3.5)
LYMPHOCYTES NFR BLD: 23 % (ref 12–49)
MCH RBC QN AUTO: 34.4 PG (ref 26–34)
MCHC RBC AUTO-ENTMCNC: 35.9 G/DL (ref 30–36.5)
MCV RBC AUTO: 95.7 FL (ref 80–99)
MONOCYTES # BLD: 0.7 K/UL (ref 0–1)
MONOCYTES NFR BLD: 8 % (ref 5–13)
NEUTS SEG # BLD: 6.4 K/UL (ref 1.8–8)
NEUTS SEG NFR BLD: 69 % (ref 32–75)
NRBC # BLD: 0 K/UL (ref 0–0.01)
NRBC BLD-RTO: 0 PER 100 WBC
PLATELET # BLD AUTO: 283 K/UL (ref 150–400)
PMV BLD AUTO: 9.9 FL (ref 8.9–12.9)
POTASSIUM SERPL-SCNC: 4.4 MMOL/L (ref 3.5–5.1)
PROT SERPL-MCNC: 8.6 G/DL (ref 6.4–8.2)
RBC # BLD AUTO: 3.93 M/UL (ref 3.8–5.2)
SODIUM SERPL-SCNC: 135 MMOL/L (ref 136–145)
WBC # BLD AUTO: 9.4 K/UL (ref 3.6–11)

## 2024-03-28 PROCEDURE — 80053 COMPREHEN METABOLIC PANEL: CPT

## 2024-03-28 PROCEDURE — 36415 COLL VENOUS BLD VENIPUNCTURE: CPT

## 2024-03-28 PROCEDURE — 93005 ELECTROCARDIOGRAM TRACING: CPT | Performed by: PSYCHIATRY & NEUROLOGY

## 2024-03-28 PROCEDURE — 6370000000 HC RX 637 (ALT 250 FOR IP): Performed by: PSYCHIATRY & NEUROLOGY

## 2024-03-28 PROCEDURE — 1240000000 HC EMOTIONAL WELLNESS R&B

## 2024-03-28 PROCEDURE — 85025 COMPLETE CBC W/AUTO DIFF WBC: CPT

## 2024-03-28 RX ADMIN — ACETAMINOPHEN 650 MG: 325 TABLET ORAL at 12:17

## 2024-03-28 RX ADMIN — ONDANSETRON 4 MG: 4 TABLET, ORALLY DISINTEGRATING ORAL at 22:00

## 2024-03-28 RX ADMIN — HYDROXYZINE HYDROCHLORIDE 50 MG: 50 TABLET, FILM COATED ORAL at 12:24

## 2024-03-28 RX ADMIN — ACETAMINOPHEN 650 MG: 325 TABLET ORAL at 16:21

## 2024-03-28 RX ADMIN — ACETAMINOPHEN 650 MG: 325 TABLET ORAL at 08:03

## 2024-03-28 RX ADMIN — SERTRALINE HYDROCHLORIDE 25 MG: 25 TABLET ORAL at 08:03

## 2024-03-28 RX ADMIN — BICTEGRAVIR SODIUM, EMTRICITABINE, AND TENOFOVIR ALAFENAMIDE FUMARATE 1 TABLET: 50; 200; 25 TABLET ORAL at 08:04

## 2024-03-28 ASSESSMENT — PAIN - FUNCTIONAL ASSESSMENT
PAIN_FUNCTIONAL_ASSESSMENT: ACTIVITIES ARE NOT PREVENTED

## 2024-03-28 ASSESSMENT — PAIN DESCRIPTION - DESCRIPTORS
DESCRIPTORS: ACHING

## 2024-03-28 ASSESSMENT — PAIN DESCRIPTION - LOCATION
LOCATION: ABDOMEN;CHEST
LOCATION: BACK
LOCATION: CHEST;BACK

## 2024-03-28 ASSESSMENT — PAIN SCALES - GENERAL
PAINLEVEL_OUTOF10: 7
PAINLEVEL_OUTOF10: 2
PAINLEVEL_OUTOF10: 7
PAINLEVEL_OUTOF10: 7

## 2024-03-28 ASSESSMENT — PAIN DESCRIPTION - ORIENTATION
ORIENTATION: MID;UPPER;LOWER
ORIENTATION: MID

## 2024-03-28 ASSESSMENT — PAIN SCALES - WONG BAKER: WONGBAKER_NUMERICALRESPONSE: HURTS A LITTLE BIT

## 2024-03-28 NOTE — GROUP NOTE
Group Therapy Note    Date: 3/27/2024    Group Start Time: 1945  Group End Time: 2030  Group Topic: Recreational    SSR 2 BH NON ACUTE    Jaqui Valdez        Group Therapy Note    Attendees: 6/9    Recreational Therapist facilitated structured leisure skills group to introduce healthy leisure skills as positive way to cope and manage mood.               Notes:  Did not attend group despite encouragement    Discipline Responsible: Recreational Therapist      Signature:  PAPA Bell
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
## 2024-03-28 NOTE — PROGRESS NOTES
Vitals:    03/28/24 0758   BP: (!) 159/107   Pulse: 70   Resp: 20   Temp: 98.1 °F (36.7 °C)   SpO2: 100%     Patient Active Problem List   Diagnosis    HIV infection ([facility])    H. pylori infection    Dysmenorrhea    Fatigue    Psychosis, unspecified psychosis type ([facility])       Current Facility-Administered Medications:     ondansetron (ZOFRAN-ODT) disintegrating tablet 4 mg, 4 mg, Oral, Q6H PRN, Tal Hollis MD, 4 mg at 03/27/24 0838    sertraline (ZOLOFT) tablet 25 mg, 25 mg, Oral, Daily, Tal Hollis MD, 25 mg at 03/28/24 0803    QUEtiapine (SEROQUEL) tablet 25 mg, 25 mg, Oral, Nightly, Tal Hollis MD, 25 mg at 03/27/24 2128    acetaminophen (TYLENOL) tablet 650 mg, 650 mg, Oral, Q4H PRN, Tal Hollis MD, 650 mg at 03/28/24 0803    hydrOXYzine HCl (ATARAX) tablet 50 mg, 50 mg, Oral, TID PRN, Tal Hollis MD, 50 mg at 03/27/24 0230    traZODone (DESYREL) tablet 50 mg, 50 mg, Oral, Nightly PRN, Tal Hollis MD, 50 mg at 03/27/24 0230    magnesium hydroxide (MILK OF MAGNESIA) 400 MG/5ML suspension 30 mL, 30 mL, Oral, Daily PRN, Tal Hollis MD    aluminum & magnesium hydroxide-simethicone (MAALOX) 200-200-20 MG/5ML suspension 30 mL, 30 mL, Oral, Q6H PRN, Tal Hollis MD    bictegravir-emtricitab-tenofovir alafenamide (BIKTARVY) -25 MG per tablet 1 tablet, 1 tablet, Oral, Daily, Tal Hollis MD, 1 tablet at 03/28/24 0804

## 2024-03-28 NOTE — BH NOTE
Alert and oriented to person and place. Affect and mood are anxious and depressed. Denies SI/HI at this time. Continues  to be somatic and preoccupied. Pt often makes irrational requests for unrealistic things. Pt reports not sleeping well last night due peer on unit up all night coming in her room and slamming doors and screaming. Pt will be moved to non-acute unit after lunch. Medication and meal compliant.

## 2024-03-28 NOTE — BH NOTE
Patient with flat, isolative, withdrawn and seemed entitled. She wanted to know if this writer was going to massage her or was writer going to send someone in to massage her because she was missing her chiropractor appointment and that's what she really needed right now, a massage. Nurse explained hospitals do not massage patients, and there are no chiropractors in the facility. Pt was irritated due to this. She was med compliant but did not require or request any prn meds for anxiety or sleep. She rated her depression 0/10, anxiety 0/10, and denied SI, HI and hallucinations. She did also want to know when the doctor was going to come in and assess her because \"I am dehydrated and I need an iv bag to rehydrate me\". Nurse asked if patient was having any symptoms of dehydration and she couldn't name any. Nurse assessed patient for dizziness, and nausea and denied both saying she was drinking ok but had thrown up the previous day but was no longer nauseated. Nurse encouraged her to drink fluids but patient refused any snack or a drink refill. Pt has been resting quietly, appears to be sleeping well with no signs of distress noted, respirations regular and unlabored. Will continue to monitor patient closely every 15 mins as per unit protocol.

## 2024-03-28 NOTE — PLAN OF CARE
Problem: Pain  Goal: Verbalizes/displays adequate comfort level or baseline comfort level  3/28/2024 0852 by Kamini Jane RN  Outcome: Progressing  3/28/2024 0407 by Jay Gresham RN  Outcome: Progressing     Problem: Anxiety  Goal: Will report anxiety at manageable levels  Description: INTERVENTIONS:  1. Administer medication as ordered  2. Teach and rehearse alternative coping skills  3. Provide emotional support with 1:1 interaction with staff  3/28/2024 0852 by Kamini Jane RN  Outcome: Progressing  3/28/2024 0407 by Jay Gresham RN  Outcome: Progressing     Problem: Coping  Goal: Pt/Family able to verbalize concerns and demonstrate effective coping strategies  Description: INTERVENTIONS:  1. Assist patient/family to identify coping skills, available support systems and cultural and spiritual values  2. Provide emotional support, including active listening and acknowledgement of concerns of patient and caregivers  3. Reduce environmental stimuli, as able  4. Instruct patient/family in relaxation techniques, as appropriate  5. Assess for spiritual pain/suffering and initiate Spiritual Care, Psychosocial Clinical Specialist consults as needed  Outcome: Progressing     Problem: Confusion  Goal: Confusion, delirium, dementia, or psychosis is improved or at baseline  Description: INTERVENTIONS:  1. Assess for possible contributors to thought disturbance, including medications, impaired vision or hearing, underlying metabolic abnormalities, dehydration, psychiatric diagnoses, and notify attending LIP  2. [place] high risk fall precautions, as indicated  3. Provide frequent short contacts to provide reality reorientation, refocusing and direction  4. Decrease environmental stimuli, including noise as appropriate  5. Monitor and intervene to maintain adequate nutrition, hydration, elimination, sleep and activity  6. If unable to ensure safety without constant attention obtain sitter and review

## 2024-03-28 NOTE — BH NOTE
1621- Pt requesting prn tylenol for stomach and chest pain 7/10. Pt states the pain feels \"aching\" with onset this morning. Pt states that she feels like she needs IV fluids. Writer encouraged po hydration intake and pt complied. Pain reassessed at 1651 and pt reports pain 2/10 patient satisfied.

## 2024-03-28 NOTE — PLAN OF CARE
Problem: Pain  Goal: Verbalizes/displays adequate comfort level or baseline comfort level  Outcome: Progressing     Problem: Anxiety  Goal: Will report anxiety at manageable levels  Description: INTERVENTIONS:  1. Administer medication as ordered  2. Teach and rehearse alternative coping skills  3. Provide emotional support with 1:1 interaction with staff  Outcome: Progressing     Problem: Confusion  Goal: Confusion, delirium, dementia, or psychosis is improved or at baseline  Description: INTERVENTIONS:  1. Assess for possible contributors to thought disturbance, including medications, impaired vision or hearing, underlying metabolic abnormalities, dehydration, psychiatric diagnoses, and notify attending LIP  2. [place] high risk fall precautions, as indicated  3. Provide frequent short contacts to provide reality reorientation, refocusing and direction  4. Decrease environmental stimuli, including noise as appropriate  5. Monitor and intervene to maintain adequate nutrition, hydration, elimination, sleep and activity  6. If unable to ensure safety without constant attention obtain sitter and review sitter guidelines with assigned personnel  7. Initiate Psychosocial CNS and Spiritual Care consult, as indicated  Outcome: Progressing     Problem: Behavior  Goal: Pt/Family maintain appropriate behavior and adhere to behavioral management agreement, if implemented  Description: INTERVENTIONS:  1. Assess patient/family's coping skills and  non-compliant behavior (including use of illegal substances)  2. Notify security of behavior or suspected illegal substances which indicate the need for search of the family and/or belongings  3. Encourage verbalization of thoughts and concerns in a socially appropriate manner  4. Utilize positive, consistent limit setting strategies supporting safety of patient, staff and others  5. Encourage participation in the decision making process about the behavioral management

## 2024-03-28 NOTE — GROUP NOTE
Group Therapy Note    Date: 3/28/2024    Group Start Time: 1100  Group End Time: 1140  Group Topic: Education Group - Inpatient    SSR 2  NON ACUTE    Neeru Grace        Group Therapy Note    Facilitated group to focus on defining different types of defense mechanisms and being able to recognize some defenses that was used to cope with stress and anxiety     Attendees: 6/11      Notes:  Encouraged but did not attend    Discipline Responsible: Recreational Therapist      Signature:  PAPA Brooks

## 2024-03-28 NOTE — PROGRESS NOTES
PSYCHIATRIC PROGRESS NOTE         Patient Name  Melissa Ham   Date of Birth 2000   [facility] [id number]   Medical Record Number  [id number]      Age  24 y.o.   PCP Jean Adair MD   Admit date:  3/25/2024    Room Number  239/01   [facility]   Date of Service  3/27/2024            HISTORY OF PRESENT ILLNESS/INTERVAL HISTORY:              MENTAL STATUS EXAM & VITALS                    VITALS:     Patient Vitals for the past 24 hrs:   Temp Pulse Resp BP SpO2   03/27/24 1937 97 °F (36.1 °C) 61 20 122/79 98 %   03/27/24 0707 97.9 °F (36.6 °C) 61 20 117/89 --   03/27/24 0230 98.6 °F (37 °C) 71 18 (!) 131/95 98 %     Wt Readings from Last 3 Encounters:   03/25/24 54.4 kg (120 lb)   02/24/24 52.2 kg (115 lb)   01/11/24 53.1 kg (117 lb)     Temp Readings from Last 3 Encounters:   03/27/24 97 °F (36.1 °C) (Oral)   02/24/24 98.2 °F (36.8 °C) (Oral)   01/17/24 98.1 °F (36.7 °C) (Oral)     BP Readings from Last 3 Encounters:   03/27/24 122/79   02/24/24 119/73   01/17/24 114/77     Pulse Readings from Last 3 Encounters:   03/27/24 61   02/24/24 74   01/17/24 72            DATA     LABORATORY DATA:(reviewed/updated 3/27/2024)  Recent Results (from the past 24 hour(s))   Troponin    Collection Time: 03/27/24  5:08 AM   Result Value Ref Range    Troponin, High Sensitivity <4 0 - 51 ng/L   CK    Collection Time: 03/27/24  5:08 AM   Result Value Ref Range    Total CK 76 26 - 192 U/L   EKG 12 Lead    Collection Time: 03/27/24  5:21 AM   Result Value Ref Range    Ventricular Rate 80 BPM    Atrial Rate 80 BPM    P-R Interval 134 ms    QRS Duration 84 ms    Q-T Interval 430 ms    QTc Calculation (Bazett) 495 ms    P Axis 44 degrees    R Axis 54 degrees    T Axis 58 degrees    Diagnosis       Sinus rhythm with sinus arrhythmia  Prolonged QT  Abnormal ECG    Confirmed by Michael Molina ([id number]) on 3/27/2024 12:19:28 PM        No results found for: \"VALAC\", \"VALP\", \"CARB2\"  No results found for: \"LITHM\"   RADIOLOGY  above medications.     Obtain psychiatric records from previous [facility] hospitals to further elucidate the nature of patient's psychopathology and review once available.    Gather additional collateral information from friends, family and o/p treatment team to further elucidate the nature of patient's psychopathology and baselline level of psychiatric functioning.         I certify that this patient's inpatient psychiatric hospital services continue to be, required for treatment that could reasonably be expected to improve the patient's condition and that the patient continues to need, on a daily basis, active treatment furnished directly by or requiring the supervision of inpatient psychiatric facility personnel. In addition, the hospital records show that services furnished were intensive treatment services.    Signed By:   Tal Hollis MD  3/27/2024

## 2024-03-29 VITALS
BODY MASS INDEX: 18.83 KG/M2 | HEART RATE: 94 BPM | OXYGEN SATURATION: 99 % | HEIGHT: 67 IN | WEIGHT: 120 LBS | SYSTOLIC BLOOD PRESSURE: 113 MMHG | DIASTOLIC BLOOD PRESSURE: 73 MMHG | TEMPERATURE: 99.3 F | RESPIRATION RATE: 18 BRPM

## 2024-03-29 PROCEDURE — 6370000000 HC RX 637 (ALT 250 FOR IP): Performed by: PSYCHIATRY & NEUROLOGY

## 2024-03-29 RX ORDER — TRAZODONE HYDROCHLORIDE 50 MG/1
50 TABLET ORAL NIGHTLY PRN
Qty: 30 TABLET | Refills: 0 | Status: SHIPPED | OUTPATIENT
Start: 2024-03-29

## 2024-03-29 RX ORDER — HYDROXYZINE 50 MG/1
25 TABLET, FILM COATED ORAL DAILY PRN
Qty: 15 TABLET | Refills: 0 | Status: SHIPPED | OUTPATIENT
Start: 2024-03-29 | End: 2024-04-28

## 2024-03-29 RX ORDER — BICTEGRAVIR SODIUM, EMTRICITABINE, AND TENOFOVIR ALAFENAMIDE FUMARATE 50; 200; 25 MG/1; MG/1; MG/1
1 TABLET ORAL DAILY
Qty: 30 TABLET | Refills: 0 | Status: SHIPPED | OUTPATIENT
Start: 2024-03-29

## 2024-03-29 RX ORDER — QUETIAPINE FUMARATE 25 MG/1
25 TABLET, FILM COATED ORAL NIGHTLY
Qty: 60 TABLET | Refills: 3 | Status: SHIPPED | OUTPATIENT
Start: 2024-03-29

## 2024-03-29 RX ORDER — SERTRALINE HYDROCHLORIDE 25 MG/1
25 TABLET, FILM COATED ORAL DAILY
Qty: 30 TABLET | Refills: 3 | Status: SHIPPED | OUTPATIENT
Start: 2024-03-30

## 2024-03-29 RX ADMIN — BICTEGRAVIR SODIUM, EMTRICITABINE, AND TENOFOVIR ALAFENAMIDE FUMARATE 1 TABLET: 50; 200; 25 TABLET ORAL at 08:27

## 2024-03-29 RX ADMIN — SERTRALINE HYDROCHLORIDE 25 MG: 25 TABLET ORAL at 08:27

## 2024-03-29 NOTE — BH NOTE
No further c/o nausea and vomiting; patient continues to rest quietly with eyes closed. Will continue to monitor.

## 2024-03-29 NOTE — GROUP NOTE
Group Therapy Note    Date: 3/29/2024    Group Start Time: 1100  Group End Time: 1130  Group Topic: Process Group - Inpatient    SSR 2 [facility] ACUTE    Desirae Flores        Group Therapy Note: Due to low census this writer was unable to conduct a group session and so each individual was provided with a handout on \"self care tips\" to reflect on during their own time and approach any of the cm/staff if further assistance was needed.     Attendees: 8       Patient's Goal:  to attend groups    Notes:  Pt was provided with a handout        Signature:  Desirae Flores

## 2024-03-29 NOTE — BH NOTE
Pt signed a kamron in grandmother's name/maria l [id number], reached out, grandmother stated pt does live with her and she was made aware of pt's dc for today. Grandmother did not voice any safety concerns and did not have any further questions.

## 2024-03-29 NOTE — PROGRESS NOTES
Vitals:    03/29/24 0701   BP: 113/73   Pulse: 94   Resp: 18   Temp: 99.3 °F (37.4 °C)   SpO2: 99%     Patient Active Problem List   Diagnosis    HIV infection ([facility])    H. pylori infection    Dysmenorrhea    Fatigue    Psychosis, unspecified psychosis type ([facility])       Current Facility-Administered Medications:     ondansetron (ZOFRAN-ODT) disintegrating tablet 4 mg, 4 mg, Oral, Q6H PRN, Tal Hollis MD, 4 mg at 03/28/24 2200    sertraline (ZOLOFT) tablet 25 mg, 25 mg, Oral, Daily, Tal Hollis MD, 25 mg at 03/29/24 0827    QUEtiapine (SEROQUEL) tablet 25 mg, 25 mg, Oral, Nightly, Tal Hollis MD, 25 mg at 03/27/24 2128    acetaminophen (TYLENOL) tablet 650 mg, 650 mg, Oral, Q4H PRN, Tal Hollis MD, 650 mg at 03/28/24 1621    hydrOXYzine HCl (ATARAX) tablet 50 mg, 50 mg, Oral, TID PRN, Tal Hollis MD, 50 mg at 03/28/24 1224    traZODone (DESYREL) tablet 50 mg, 50 mg, Oral, Nightly PRN, Tal Hollis MD, 50 mg at 03/27/24 0230    magnesium hydroxide (MILK OF MAGNESIA) 400 MG/5ML suspension 30 mL, 30 mL, Oral, Daily PRN, Tal Hollis MD    aluminum & magnesium hydroxide-simethicone (MAALOX) 200-200-20 MG/5ML suspension 30 mL, 30 mL, Oral, Q6H PRN, Tal Hollis MD    bictegravir-emtricitab-tenofovir alafenamide (BIKTARVY) -25 MG per tablet 1 tablet, 1 tablet, Oral, Daily, Tal Hollis MD, 1 tablet at 03/29/24 0827

## 2024-03-29 NOTE — BH NOTE
Behavioral Health Transition Record to Provider    Patient Name: Melissa Ham  YOB: 2000  Medical Record Number: [id number]  Date of Admission: 3/25/2024  Date of Discharge: 3/29/2024    Attending Provider: Tal Hollis MD  Discharging Provider: Dr Hollis  To contact this individual call  and ask the  to page.  If unavailable, ask to be transferred to Behavioral Health Provider on call.  A Behavioral Health Provider will be available on call 24/7 and during holidays.    Primary Care Provider: Jean Adair MD    No Known Allergies    Reason for Admission:  Pt was brought into the ED by her grandmother due to her bizarre, paranoid and \"talking in code\" per ED note. Pt was hyper [employer] and reported VH with seeing her co-workers eye go missing and come back. Pt denied any SI/HI      Admission Diagnosis: Hallucinations [R44.3]  Delusions (HCC) [F22]  Paranoia (HCC) [F22]  Psychosis, unspecified psychosis type (HCC) [F29]    * No surgery found *    Results for orders placed or performed during the hospital encounter of 03/25/24   COVID-19 & Influenza Combo    Specimen: Nasopharyngeal   Result Value Ref Range    SARS-CoV-2, PCR Not Detected Not Detected      Rapid Influenza A By PCR Not Detected Not Detected      Rapid Influenza B By PCR Not Detected Not Detected     CBC with Auto Differential   Result Value Ref Range    WBC 4.5 3.6 - 11.0 K/uL    RBC 3.55 (L) 3.80 - 5.20 M/uL    Hemoglobin 12.0 11.5 - 16.0 g/dL    Hematocrit 35.4 35.0 - 47.0 %    MCV 99.7 (H) 80.0 - 99.0 FL    MCH 33.8 26.0 - 34.0 PG    MCHC 33.9 30.0 - 36.5 g/dL    RDW 12.8 11.5 - 14.5 %    Platelets 252 150 - 400 K/uL    MPV 9.7 8.9 - 12.9 FL    Nucleated RBCs 0.0 0.0  WBC    nRBC 0.00 0.00 - 0.01 K/uL    Neutrophils % 47 32 - 75 %    Lymphocytes % 45 12 - 49 %    Monocytes % 7 5 - 13 %    Eosinophils % 0 0 - 7 %    Basophils % 1 0 - 1 %    Immature Granulocytes 0 0 - 0.5 %    Neutrophils Absolute 2.1

## 2024-03-29 NOTE — BH NOTE
Nurse Note:    Patient is isolative to the room this evening and observed with flat affect. No report of SI, HI, A/V hallucinations. No report of anxiety and depression; appears sad and states, \"I wish my grandmother could come pick me up and take me home \" . No c/o chest pain or stomach pain currently. Patient received HS medications and vomited immediately. States that she is not eating well and does not get the food choices that she selects for meals. Patient received Prn Zofran with no further nausea or vomiting at this time. Patient given ginger ale and encouraged to drink fluids. Patient tolerated the ginger ale and able to keep it down. Patient observed resting quietly. Will continue to monitor for safety.

## 2024-03-29 NOTE — PLAN OF CARE
Problem: Anxiety  Goal: Will report anxiety at manageable levels  Description: INTERVENTIONS:  1. Administer medication as ordered  2. Teach and rehearse alternative coping skills  3. Provide emotional support with 1:1 interaction with staff  Outcome: Not Progressing     Problem: Coping  Goal: Pt/Family able to verbalize concerns and demonstrate effective coping strategies  Description: INTERVENTIONS:  1. Assist patient/family to identify coping skills, available support systems and cultural and spiritual values  2. Provide emotional support, including active listening and acknowledgement of concerns of patient and caregivers  3. Reduce environmental stimuli, as able  4. Instruct patient/family in relaxation techniques, as appropriate  5. Assess for spiritual pain/suffering and initiate Spiritual Care, Psychosocial Clinical Specialist consults as needed  Outcome: Not Progressing

## 2024-03-29 NOTE — BH NOTE
Pt up ad mitchel on unit pleasant and cooperative with staff and peers, mostly isolative to room. Pt accepted scheduled medications without difficulty and denied SI/HI/AVH. Pt denies pain or any physical complaints. Pt denied depression and anxiety. Pt states \"I just want to go home.    Pt ordered to be discharged today by Dr. Hollis. Writer reviewed discharge summary with pt and pt verbalized understanding of instructions. Pt continued to deny SI/HI at time of discharge and denied pain or any physical complaints at time of discharge. Pt agreed to included safety plan and feels safe to discharge home. Pt received items from security and had no items in unit safe. Pt had no items in storage but pt's grandmother brought pt clothing to change into prior to being picked up. Pt escorted off unit by [facility] director at 1620 to pt's grandmother in [facility] waiting area.

## 2024-03-29 NOTE — BH NOTE
0816- Pt did not get up for breakfast despite encouragement. Pt stated \"I'll just eat some crackers that I have over here.\" Meal tray taken to pt room to attempt to encourage intake. Writer provided pt with a cup of water.

## 2024-03-29 NOTE — BH NOTE
DISCHARGE SUMMARY    NAME:Melissa Ham  : 2000  MRN: [id number]    The patient Melissa Ham exhibits the ability to control behavior in a less restrictive environment.  Patient's level of functioning is improving.  No assaultive/destructive behavior has been observed for the past 24 hours.  No suicidal/homicidal threat or behavior has been observed for the past 24 hours.  There is no evidence of serious medication side effects.  Patient has not been in physical or protective restraints for at least the past 24 hours.    If weapons involved, how are they secured? N/a    Is patient aware of and in agreement with discharge plan? yes    Arrangements for medication:  Prescriptions see chart    Copy of discharge instructions to provider?:  yes    Arrangements for transportation home:  self    Keep all follow up appointments as scheduled, continue to take prescribed medications per physician instructions.  Mental health crisis number:  [phone]    Mental Health Emergency WARM LINE      [phone]      M-F: 9am to 9pm      Sat & Sun: 5pm - 9pm  National suicide prevention lines:                             5-054-OLSORRG ([phone])       [phone] ([phone])    Crisis Text Line:  Text HOME to [phone]

## 2024-03-29 NOTE — PROGRESS NOTES
PSYCHIATRIC PROGRESS NOTE         Patient Name  Melissa Ham   Date of Birth 2000   [facility] [id number]   Medical Record Number  [id number]      Age  24 y.o.   PCP Jean Adair MD   Admit date:  3/25/2024    Room Number  231/02   [facility]   Date of Service  3/29/2024            HISTORY OF PRESENT ILLNESS/INTERVAL HISTORY:              MENTAL STATUS EXAM & VITALS                    VITALS:     Patient Vitals for the past 24 hrs:   Temp Pulse Resp BP SpO2   03/29/24 0701 99.3 °F (37.4 °C) 94 18 113/73 99 %   03/28/24 1912 98.1 °F (36.7 °C) 59 18 (!) 143/96 100 %     Wt Readings from Last 3 Encounters:   03/25/24 54.4 kg (120 lb)   02/24/24 52.2 kg (115 lb)   01/11/24 53.1 kg (117 lb)     Temp Readings from Last 3 Encounters:   03/29/24 99.3 °F (37.4 °C) (Oral)   02/24/24 98.2 °F (36.8 °C) (Oral)   01/17/24 98.1 °F (36.7 °C) (Oral)     BP Readings from Last 3 Encounters:   03/29/24 113/73   02/24/24 119/73   01/17/24 114/77     Pulse Readings from Last 3 Encounters:   03/29/24 94   02/24/24 74   01/17/24 72            DATA     LABORATORY DATA:(reviewed/updated 3/29/2024)  No results found for this or any previous visit (from the past 24 hour(s)).   No results found for: \"VALAC\", \"VALP\", \"CARB2\"  No results found for: \"LITHM\"   RADIOLOGY REPORTS:(reviewed/updated 3/29/2024)  No results found.       MEDICATIONS     ALL MEDICATIONS:   Current Facility-Administered Medications   Medication Dose Route Frequency    ondansetron (ZOFRAN-ODT) disintegrating tablet 4 mg  4 mg Oral Q6H PRN    sertraline (ZOLOFT) tablet 25 mg  25 mg Oral Daily    QUEtiapine (SEROQUEL) tablet 25 mg  25 mg Oral Nightly    acetaminophen (TYLENOL) tablet 650 mg  650 mg Oral Q4H PRN    hydrOXYzine HCl (ATARAX) tablet 50 mg  50 mg Oral TID PRN    traZODone (DESYREL) tablet 50 mg  50 mg Oral Nightly PRN    magnesium hydroxide (MILK OF MAGNESIA) 400 MG/5ML suspension 30 mL  30 mL Oral Daily PRN    aluminum & magnesium hydroxide-simethicone  (MAALOX) 200-200-20 MG/5ML suspension 30 mL  30 mL Oral Q6H PRN    bictegravir-emtricitab-tenofovir alafenamide (BIKTARVY) -25 MG per tablet 1 tablet  1 tablet Oral Daily      SCHEDULED MEDICATIONS:    sertraline  25 mg Oral Daily    QUEtiapine  25 mg Oral Nightly    bictegravir-emtricitab-tenofovir alafenamide  1 tablet Oral Daily           ASSESSMENT & PLAN   Progress note for March 28, 2024 patient seen for follow-up chart reviewed discussed in the treatment team talked with the nursing staff patient remains needy dependent anxious    No nausea or vomiting poor drinking andeating still anxious depressed but not suicidal or homicidal not psychotic    That her CBC CMP EKG unremarkable she has unrealistic expectations of what somewhat to do massage to have a chiropractor felt more depressed being here  Continue close observation wonder whether it could be a massage therapist or a chiropractor to want to go home staff has not heard from the grandmother with whom she was living patient compliant with the medication given the feedback on the lab work need to eat and drink were making effort to reach to grandmother for collateral but also to receive that she is willing to have the patient back continued inpatient level of care indicated for further stabilization and safe discharge planning  Discussed meds  Provided support, psycho edu  Discussed with staff in the treatment team, the progress made in therapy, psychosocial needs and nursing concerns.    The following regarding medications was addressed during rounds with patient:   the risks and benefits of the proposed medication.   The patient was given the opportunity to ask questions.   Informed consent given to the use of the above medications.     Obtain psychiatric records from previous [facility] hospitals to further elucidate the nature of patient's psychopathology and review once available.    Gather additional collateral information from friends, family and

## 2024-03-31 NOTE — DISCHARGE SUMMARY
felt that she received a maximum benefit from the hospitalization and discharge with outpatient followup.  She did give permission to talk with the grandmother, and grandmother has no objection, willing to have her home.  Discharge approved.  Diagnoses are major depression with psychosis, recurrent; THC abuse; and some post-traumatic stress disorder issues.    DISCHARGE MEDICATIONS:  Sertraline 25 mg daily, Seroquel 25 mg at night, trazodone 50 mg 1 at night, hydroxyzine 50 mg half a tablet b.i.d. p.r.n., Biktarvy 1 tablet daily.    ALLERGIES TO MEDICATIONS:  No known allergies to medications.      LABORATORY DATA:  CBC; RBC 3.55, MCV 99.7, otherwise unremarkable.  CMP unremarkable.  Liver functions unremarkable.  Ethanol level less than 10.  Magnesium level 2.  COVID-19, influenza A and B not detected.  Urine drug screen positive for THC.  Urinalysis is turbid, protein 30, ketones 20.  Culture not indicated by UA results.  Urine pregnancy test negative.    DIAGNOSES:    1. Major depression, recurrent, acute, severe, with psychosis.  2. THC abuse.  3. Human immunodeficiency virus positive.  4. Post-traumatic stress disorder.    CONDITION ON DISCHARGE:  Discharge as improved.  Not suicidal, not homicidal, not psychotic.    PROGNOSIS:  Guarded.        MD ANGIE LUGO/HEATHER  D:  03/30/2024 21:47:37  T:  03/30/2024 23:09:53  JOB #:  [id number]

## 2024-05-11 NOTE — PROGRESS NOTES
I reviewed with the resident the medical history and the resident's findings on the physical examination. I discussed with the resident the patient's diagnosis and concur with the plan. [de-identified] : General: Well-nourished, well-developed, alert, and in no acute distress. Head: Normocephalic. Eyes: Pupils equal, extraocular muscles intact, normal sclera. Nose: No nasal discharge. Cardiovascular: Extremities are warm and well perfused. Distal pulses are symmetric bilaterally. Respiratory: No labored breathing. Extremities: Sensation is intact distally bilaterally. Distal pulses are symmetric bilaterally Lymphatic: No regional lymphadenopathy, no lymphedema Neurologic: No focal deficits Skin: Normal skin color, texture, and turgor Psychiatric: Normal affect   MSK:  Examination of [right] knee:  Mild effusion No erythema, hematoma or skin lesion Tender to palpation: medial joint line, lateral joint line, medial patellar facet, lateral patellar facet Nontender to palpation: quad tendon, patellar tendon, pes, Gerdy's tubercle, tibial tuberosity, popliteal fossa, hamstrings, ITB No warmth No Baker's cyst palpable ROM: 0-[90] Mild patellar crepitus  Log roll negative Lachman negative Anterior drawer negative Posterior drawer negative Varus/valgus stress negative at 0 and 30 deg Chloe negative Extensor mechanism intact  Examination of [left] knee:  No effusion, erythema, hematoma or skin lesion Tender to palpation: medial joint line, lateral joint line, medial patellar facet, lateral patellar facet Nontender to palpation: quad tendon, patellar tendon, pes, Gerdy's tubercle, tibial tuberosity, popliteal fossa, hamstrings, ITB No warmth No Baker's cyst palpable ROM: 0-90 [Mild] patellar crepitus  Log roll negative Lachman negative Anterior drawer negative Posterior drawer negative Varus/valgus stress negative at 0 and 30 deg Chloe negative Extensor mechanism intact  Sensation is intact to light touch over the superficial and deep peroneal nerve distributions and the posterior tibial nerve distribution. Capillary refill is less than two seconds. Posterior tibial and dorsalis pedis pulses 2+ equal bilaterally. No calf swelling or tenderness bilaterally. Strength testing shows hip flexion 5/5, hip adduction 5/5, hip abduction 5/5, knee extension 5/5, knee flexion 5/5, dorsiflexion 5/5, plantar flexion 5/5, EHL 5/5 Reflexes: Patellar 2+, Achilles 2+

## 2024-07-27 ENCOUNTER — HOSPITAL ENCOUNTER (INPATIENT)
Facility: HOSPITAL | Age: 24
LOS: 9 days | Discharge: HOME | DRG: 885 | End: 2024-08-06
Attending: EMERGENCY MEDICINE | Admitting: PSYCHIATRY & NEUROLOGY
Payer: MEDICAID

## 2024-07-27 DIAGNOSIS — B20 HIV INFECTION, UNSPECIFIED SYMPTOM STATUS (HCC): ICD-10-CM

## 2024-07-27 DIAGNOSIS — F29 PSYCHOSIS, UNSPECIFIED PSYCHOSIS TYPE (HCC): Primary | ICD-10-CM

## 2024-07-27 LAB
ALBUMIN SERPL-MCNC: 3.9 G/DL (ref 3.5–5)
ALBUMIN/GLOB SERPL: 0.9 (ref 1.1–2.2)
ALP SERPL-CCNC: 57 U/L (ref 45–117)
ALT SERPL-CCNC: 18 U/L (ref 12–78)
AMPHET UR QL SCN: NEGATIVE
ANION GAP SERPL CALC-SCNC: 6 MMOL/L (ref 5–15)
APPEARANCE UR: CLEAR
AST SERPL W P-5'-P-CCNC: 12 U/L (ref 15–37)
BACTERIA URNS QL MICRO: NEGATIVE /HPF
BARBITURATES UR QL SCN: NEGATIVE
BASOPHILS # BLD: 0 K/UL (ref 0–0.1)
BASOPHILS NFR BLD: 1 % (ref 0–1)
BILIRUB SERPL-MCNC: 0.5 MG/DL (ref 0.2–1)
BILIRUB UR QL: NEGATIVE
BUN SERPL-MCNC: 14 MG/DL (ref 6–20)
BUN/CREAT SERPL: 13 (ref 12–20)
CA-I BLD-MCNC: 9.6 MG/DL (ref 8.5–10.1)
CANNABINOIDS UR QL SCN: POSITIVE
CHLORIDE SERPL-SCNC: 108 MMOL/L (ref 97–108)
CO2 SERPL-SCNC: 23 MMOL/L (ref 21–32)
COCAINE UR QL SCN: NEGATIVE
COLOR UR: ABNORMAL
CREAT SERPL-MCNC: 1.06 MG/DL (ref 0.55–1.02)
DIFFERENTIAL METHOD BLD: ABNORMAL
EOSINOPHIL # BLD: 0.2 K/UL (ref 0–0.4)
EOSINOPHIL NFR BLD: 3 % (ref 0–7)
EPITH CASTS URNS QL MICRO: ABNORMAL /LPF
ERYTHROCYTE [DISTWIDTH] IN BLOOD BY AUTOMATED COUNT: 12.3 % (ref 11.5–14.5)
ETHANOL SERPL-MCNC: <10 MG/DL (ref 0–0.08)
GLOBULIN SER CALC-MCNC: 4.2 G/DL (ref 2–4)
GLUCOSE SERPL-MCNC: 90 MG/DL (ref 65–100)
GLUCOSE UR STRIP.AUTO-MCNC: NEGATIVE MG/DL
HCG SERPL QL: NEGATIVE
HCT VFR BLD AUTO: 37.7 % (ref 35–47)
HGB BLD-MCNC: 12.5 G/DL (ref 11.5–16)
HGB UR QL STRIP: NEGATIVE
IMM GRANULOCYTES # BLD AUTO: 0 K/UL (ref 0–0.04)
IMM GRANULOCYTES NFR BLD AUTO: 0 % (ref 0–0.5)
KETONES UR QL STRIP.AUTO: NEGATIVE MG/DL
LEUKOCYTE ESTERASE UR QL STRIP.AUTO: NEGATIVE
LYMPHOCYTES # BLD: 2.7 K/UL (ref 0.8–3.5)
LYMPHOCYTES NFR BLD: 51 % (ref 12–49)
Lab: ABNORMAL
MCH RBC QN AUTO: 33.3 PG (ref 26–34)
MCHC RBC AUTO-ENTMCNC: 33.2 G/DL (ref 30–36.5)
MCV RBC AUTO: 100.5 FL (ref 80–99)
METHADONE UR QL: NEGATIVE
MONOCYTES # BLD: 0.2 K/UL (ref 0–1)
MONOCYTES NFR BLD: 4 % (ref 5–13)
NEUTS SEG # BLD: 2.2 K/UL (ref 1.8–8)
NEUTS SEG NFR BLD: 41 % (ref 32–75)
NITRITE UR QL STRIP.AUTO: NEGATIVE
NRBC # BLD: 0 K/UL (ref 0–0.01)
NRBC BLD-RTO: 0 PER 100 WBC
OPIATES UR QL: NEGATIVE
PCP UR QL: NEGATIVE
PH UR STRIP: 7 (ref 5–8)
PLATELET # BLD AUTO: 242 K/UL (ref 150–400)
PMV BLD AUTO: 9.6 FL (ref 8.9–12.9)
POTASSIUM SERPL-SCNC: 3.8 MMOL/L (ref 3.5–5.1)
PROT SERPL-MCNC: 8.1 G/DL (ref 6.4–8.2)
PROT UR STRIP-MCNC: NEGATIVE MG/DL
RBC # BLD AUTO: 3.75 M/UL (ref 3.8–5.2)
RBC #/AREA URNS HPF: ABNORMAL /HPF (ref 0–5)
SODIUM SERPL-SCNC: 137 MMOL/L (ref 136–145)
SP GR UR REFRACTOMETRY: 1.01 (ref 1–1.03)
URINE CULTURE IF INDICATED: ABNORMAL
UROBILINOGEN UR QL STRIP.AUTO: 0.1 EU/DL (ref 0.1–1)
WBC # BLD AUTO: 5.3 K/UL (ref 3.6–11)
WBC URNS QL MICRO: ABNORMAL /HPF (ref 0–4)

## 2024-07-27 PROCEDURE — 81001 URINALYSIS AUTO W/SCOPE: CPT

## 2024-07-27 PROCEDURE — 82077 ASSAY SPEC XCP UR&BREATH IA: CPT

## 2024-07-27 PROCEDURE — 85025 COMPLETE CBC W/AUTO DIFF WBC: CPT

## 2024-07-27 PROCEDURE — 94761 N-INVAS EAR/PLS OXIMETRY MLT: CPT

## 2024-07-27 PROCEDURE — 84703 CHORIONIC GONADOTROPIN ASSAY: CPT

## 2024-07-27 PROCEDURE — 80053 COMPREHEN METABOLIC PANEL: CPT

## 2024-07-27 PROCEDURE — 2580000003 HC RX 258: Performed by: NURSE PRACTITIONER

## 2024-07-27 PROCEDURE — 99285 EMERGENCY DEPT VISIT HI MDM: CPT

## 2024-07-27 PROCEDURE — 80307 DRUG TEST PRSMV CHEM ANLYZR: CPT

## 2024-07-27 PROCEDURE — 6360000002 HC RX W HCPCS: Performed by: NURSE PRACTITIONER

## 2024-07-27 RX ADMIN — ZIPRASIDONE MESYLATE 20 MG: 20 INJECTION, POWDER, LYOPHILIZED, FOR SOLUTION INTRAMUSCULAR at 19:14

## 2024-07-27 ASSESSMENT — LIFESTYLE VARIABLES
HOW OFTEN DO YOU HAVE A DRINK CONTAINING ALCOHOL: NEVER
HOW MANY STANDARD DRINKS CONTAINING ALCOHOL DO YOU HAVE ON A TYPICAL DAY: PATIENT DOES NOT DRINK

## 2024-07-27 ASSESSMENT — PAIN SCALES - GENERAL
PAINLEVEL_OUTOF10: 0
PAINLEVEL_OUTOF10: 0

## 2024-07-27 ASSESSMENT — PAIN - FUNCTIONAL ASSESSMENT: PAIN_FUNCTIONAL_ASSESSMENT: 0-10

## 2024-07-27 NOTE — ED NOTES
Charge nurse Tiera Pereira at bedside, Code [place] called by primary nurse    Patient appears verbally agitated    Physically aggressive towards self, attempting to take down light hanging from ceiling      at bedside     Primary nurse getting Geodon to medicate patient    Patient medicated and handcuffed to bed

## 2024-07-27 NOTE — ED NOTES
Pt in stretcher.  Refused to change into green gown.  Security at bedside to wand.  Keys, cell phone, & lighter removed from pt by police office and placed in locker #23.  Pt allowed staff to collect labs.    Per pt family called police.  She states that people \"are harassing her and out to get her.\"  Also states she \"can see through it all and see the truth of it.\"  Pt denies SI/HI at this time.

## 2024-07-27 NOTE — ED NOTES
Pt. Cuffed to bed by officerGeorgina ANGLIN. Pt. Screaming profanities and throwing bed items at door.

## 2024-07-27 NOTE — ED NOTES
Pt progressively getting more agitated.  Throwing things in room and slamming doors.   handcuffed pt, pt is now punching walls, pulling ceiling light down, and pulling stretcher.  New order received from DENISE Penny.

## 2024-07-27 NOTE — ED PROVIDER NOTES
[facility] EMERGENCY DEPT  EMERGENCY DEPARTMENT HISTORY AND PHYSICAL EXAM      Date: 7/27/2024  Patient Name: Melissa Ham  MRN: [id number]  YOB: 2000  Date of evaluation: 7/27/2024  Provider: LITA Fitch NP   Note Started: 5:41 PM EDT 7/27/24    HISTORY OF PRESENT ILLNESS     Chief Complaint   Patient presents with    Mental Health Problem       History Provided By: Patient    HPI: Melissa Ham is a 24 y.o. female with past medical history as listed below who presents to the ER as an E CO.  Patient was brought in by [employer] for suicidal ideation and flight of ideas.  Patient will be seen for further evaluation  PAST MEDICAL HISTORY   Past Medical History:  Past Medical History:   Diagnosis Date    HIV (human immunodeficiency virus infection) (HCC)        Past Surgical History:  Past Surgical History:   Procedure Laterality Date    EYE MUSCLE SURGERY Left 1/17/2024    LEFT EYE MUSCLE REPAIR performed by Navid Tyson MD at [facility] AMBULATORY OR    EYE SURGERY      UPPER GASTROINTESTINAL ENDOSCOPY N/A 5/16/2023    EGD BIOPSY performed by Magali Moss MD at [facility] ENDOSCOPY       Family History:  No family history on file.    Social History:  Social History     Tobacco Use    Smoking status: Never     Passive exposure: Never    Smokeless tobacco: Never   Vaping Use    Vaping Use: Never used   Substance Use Topics    Alcohol use: Never    Drug use: Yes     Types: Marijuana (Weed)     Comment: twice a week       Allergies:  No Known Allergies    PCP: Jean Adair MD    Current Meds:   Current Facility-Administered Medications   Medication Dose Route Frequency Provider Last Rate Last Admin    QUEtiapine (SEROQUEL) tablet 50 mg  50 mg Oral Nightly Antonieta Carcamo MD        LORazepam (ATIVAN) tablet 0.5 mg  0.5 mg Oral Q4H PRN Kiya Montemayor MD        acetaminophen (TYLENOL) tablet 650 mg  650 mg Oral Q4H PRN Antonieta Carcamo MD   650 mg at 07/29/24 1634    hydrOXYzine HCl (ATARAX) tablet 50 mg  50 mg Oral  25-Nov-2023

## 2024-07-27 NOTE — ED NOTES
[facility] [place] called related to behavior, staff at the bedside with [employer].  Pt physically aggressive, pt calmed by staff and agreed to receive IM injection without incident.  Pt continues to cry and scream.  Pt allowed documenting RN to assess left wrist with forensic restraint, No breakdown noted, + PMS

## 2024-07-27 NOTE — ED NOTES
Pt becoming increasingly more agitated. Throwing items in the room. Yelling and cursing at nursing staff and officer. Slamming door to room.     Pt belongings collected and placed into a labeled bag in locker room. Black jacket, 5 bracelets and a hair tie in bag.     Pt still refusing to change into a green gown and give a urine sample at this time.

## 2024-07-27 NOTE — ED NOTES
Bedside and Verbal shift change report given to MARIA R Thornton (oncoming nurse) by MARIA R Quezada (offgoing nurse). Report included the following information Nurse Handoff Report, ED Encounter Summary, ED SBAR, Adult Overview, and Neuro Assessment, attempts to remove clothing/belongings, and requests for phone.

## 2024-07-28 PROBLEM — F31.9 BIPOLAR 1 DISORDER (HCC): Status: ACTIVE | Noted: 2024-07-28

## 2024-07-28 PROBLEM — Z86.59 HX OF BIPOLAR DISORDER: Status: ACTIVE | Noted: 2024-07-28

## 2024-07-28 PROCEDURE — 1240000000 HC EMOTIONAL WELLNESS R&B

## 2024-07-28 PROCEDURE — 6370000000 HC RX 637 (ALT 250 FOR IP): Performed by: PSYCHIATRY & NEUROLOGY

## 2024-07-28 RX ORDER — HYDROXYZINE 50 MG/1
50 TABLET, FILM COATED ORAL 3 TIMES DAILY PRN
Status: DISCONTINUED | OUTPATIENT
Start: 2024-07-28 | End: 2024-08-06 | Stop reason: HOSPADM

## 2024-07-28 RX ORDER — SERTRALINE HYDROCHLORIDE 25 MG/1
25 TABLET, FILM COATED ORAL DAILY
Status: DISCONTINUED | OUTPATIENT
Start: 2024-07-29 | End: 2024-07-31

## 2024-07-28 RX ORDER — TRAZODONE HYDROCHLORIDE 50 MG/1
50 TABLET ORAL NIGHTLY PRN
Status: DISCONTINUED | OUTPATIENT
Start: 2024-07-28 | End: 2024-08-03

## 2024-07-28 RX ORDER — ACETAMINOPHEN 325 MG/1
650 TABLET ORAL EVERY 4 HOURS PRN
Status: DISCONTINUED | OUTPATIENT
Start: 2024-07-28 | End: 2024-08-06 | Stop reason: HOSPADM

## 2024-07-28 RX ORDER — MAGNESIUM HYDROXIDE/ALUMINUM HYDROXICE/SIMETHICONE 120; 1200; 1200 MG/30ML; MG/30ML; MG/30ML
30 SUSPENSION ORAL EVERY 6 HOURS PRN
Status: DISCONTINUED | OUTPATIENT
Start: 2024-07-28 | End: 2024-08-06 | Stop reason: HOSPADM

## 2024-07-28 RX ORDER — LORAZEPAM 0.5 MG/1
0.5 TABLET ORAL EVERY 4 HOURS PRN
Status: DISCONTINUED | OUTPATIENT
Start: 2024-07-28 | End: 2024-07-28

## 2024-07-28 RX ORDER — LORAZEPAM 0.5 MG/1
0.5 TABLET ORAL EVERY 4 HOURS PRN
Status: DISCONTINUED | OUTPATIENT
Start: 2024-07-28 | End: 2024-08-06 | Stop reason: HOSPADM

## 2024-07-28 RX ORDER — QUETIAPINE FUMARATE 25 MG/1
25 TABLET, FILM COATED ORAL NIGHTLY
Status: DISCONTINUED | OUTPATIENT
Start: 2024-07-28 | End: 2024-07-29

## 2024-07-28 RX ADMIN — QUETIAPINE FUMARATE 25 MG: 25 TABLET ORAL at 23:03

## 2024-07-28 ASSESSMENT — LIFESTYLE VARIABLES
HOW OFTEN DO YOU HAVE A DRINK CONTAINING ALCOHOL: MONTHLY OR LESS
HOW MANY STANDARD DRINKS CONTAINING ALCOHOL DO YOU HAVE ON A TYPICAL DAY: 1 OR 2

## 2024-07-28 ASSESSMENT — SLEEP AND FATIGUE QUESTIONNAIRES
DO YOU USE A SLEEP AID: NO
SLEEP PATTERN: DIFFICULTY FALLING ASLEEP;INSOMNIA
AVERAGE NUMBER OF SLEEP HOURS: 3
DO YOU HAVE DIFFICULTY SLEEPING: YES

## 2024-07-28 ASSESSMENT — PAIN - FUNCTIONAL ASSESSMENT
PAIN_FUNCTIONAL_ASSESSMENT: NONE - DENIES PAIN
PAIN_FUNCTIONAL_ASSESSMENT: 0-10
PAIN_FUNCTIONAL_ASSESSMENT: 0-10

## 2024-07-28 ASSESSMENT — PAIN SCALES - GENERAL
PAINLEVEL_OUTOF10: 0

## 2024-07-28 NOTE — ED NOTES
Patient requesting updates. Informed her we were still waiting on placement.  Patient has been cooperative and did say she appreciated everything.

## 2024-07-28 NOTE — BSMART NOTE
This writer rounded on patient.  Patient agreed to talk with this writer and was informed of counselor's role.    The patient's appearance shows no evidence of impairment.  The patient's behavior shows no evidence of impairment. The patient is oriented to time, place, person and situation.  The patient's speech shows no evidence of impairment.  The patient's mood  is sad.  The range of affect shows no evidence of impairment.  The patient's thought content  demonstrates no evidence of impairment.  The thought process shows no evidence of impairment.  The patient's perception shows no evidence of impairment. The patient's memory shows no evidence of impairment.  The patient's appetite is decreased and shows signs of weight loss.  The patient's sleep shows no evidence of impairment. The patient shows little insight.  The patient's judgement is psychologically impaired.  Patient denied suicidal and/or homicidal ideation.   [facility]

## 2024-07-28 NOTE — ED NOTES
Assumed care of this patient.  Patient awake in bed.  Continued one on one with staff for patients safety.  Patient cooperative.  Denies SI/HI at this time.

## 2024-07-28 NOTE — ED NOTES
Pt received breakfast tray at this time.  Patient in forensic restraints.  Skin around cuffs checked, no redness or breakdown noted.

## 2024-07-29 PROCEDURE — 6370000000 HC RX 637 (ALT 250 FOR IP): Performed by: PSYCHIATRY & NEUROLOGY

## 2024-07-29 PROCEDURE — 1240000000 HC EMOTIONAL WELLNESS R&B

## 2024-07-29 RX ORDER — QUETIAPINE FUMARATE 25 MG/1
50 TABLET, FILM COATED ORAL NIGHTLY
Status: DISCONTINUED | OUTPATIENT
Start: 2024-07-30 | End: 2024-07-30

## 2024-07-29 RX ADMIN — ACETAMINOPHEN 650 MG: 325 TABLET ORAL at 16:34

## 2024-07-29 RX ADMIN — BICTEGRAVIR SODIUM, EMTRICITABINE, AND TENOFOVIR ALAFENAMIDE FUMARATE 1 TABLET: 50; 200; 25 TABLET ORAL at 09:03

## 2024-07-29 RX ADMIN — TRAZODONE HYDROCHLORIDE 50 MG: 50 TABLET ORAL at 21:27

## 2024-07-29 RX ADMIN — SERTRALINE HYDROCHLORIDE 25 MG: 25 TABLET ORAL at 08:27

## 2024-07-29 RX ADMIN — QUETIAPINE FUMARATE 25 MG: 25 TABLET ORAL at 21:04

## 2024-07-29 ASSESSMENT — PAIN DESCRIPTION - DESCRIPTORS: DESCRIPTORS: ACHING

## 2024-07-29 ASSESSMENT — PAIN SCALES - GENERAL
PAINLEVEL_OUTOF10: 2
PAINLEVEL_OUTOF10: 7

## 2024-07-29 ASSESSMENT — PAIN DESCRIPTION - LOCATION: LOCATION: HEAD

## 2024-07-29 ASSESSMENT — PAIN SCALES - WONG BAKER: WONGBAKER_NUMERICALRESPONSE: NO HURT

## 2024-07-29 NOTE — CONSULTS
BUN/Creatinine Ratio 13 12 - 20      Est, Glom Filt Rate 75 >60 ml/min/1.73m2    Calcium 9.6 8.5 - 10.1 mg/dL    Total Bilirubin 0.5 0.2 - 1.0 mg/dL    AST 12 (L) 15 - 37 U/L    ALT 18 12 - 78 U/L    Alk Phosphatase 57 45 - 117 U/L    Total Protein 8.1 6.4 - 8.2 g/dL    Albumin 3.9 3.5 - 5.0 g/dL    Globulin 4.2 (H) 2.0 - 4.0 g/dL    Albumin/Globulin Ratio 0.9 (L) 1.1 - 2.2     CBC with Auto Differential    Collection Time: 07/27/24 10:30 AM   Result Value Ref Range    WBC 5.3 3.6 - 11.0 K/uL    RBC 3.75 (L) 3.80 - 5.20 M/uL    Hemoglobin 12.5 11.5 - 16.0 g/dL    Hematocrit 37.7 35.0 - 47.0 %    .5 (H) 80.0 - 99.0 FL    MCH 33.3 26.0 - 34.0 PG    MCHC 33.2 30.0 - 36.5 g/dL    RDW 12.3 11.5 - 14.5 %    Platelets 242 150 - 400 K/uL    MPV 9.6 8.9 - 12.9 FL    Nucleated RBCs 0.0 0.0  WBC    nRBC 0.00 0.00 - 0.01 K/uL    Neutrophils % 41 32 - 75 %    Lymphocytes % 51 (H) 12 - 49 %    Monocytes % 4 (L) 5 - 13 %    Eosinophils % 3 0 - 7 %    Basophils % 1 0 - 1 %    Immature Granulocytes % 0 0 - 0.5 %    Neutrophils Absolute 2.2 1.8 - 8.0 K/UL    Lymphocytes Absolute 2.7 0.8 - 3.5 K/UL    Monocytes Absolute 0.2 0.0 - 1.0 K/UL    Eosinophils Absolute 0.2 0.0 - 0.4 K/UL    Basophils Absolute 0.0 0.0 - 0.1 K/UL    Immature Granulocytes Absolute 0.0 0.00 - 0.04 K/UL    Differential Type AUTOMATED     HCG Qualitative, Serum    Collection Time: 07/27/24 10:30 AM   Result Value Ref Range    Preg, Serum Negative Negative     Urine Drug Screen    Collection Time: 07/27/24  9:42 PM   Result Value Ref Range    Amphetamine, Urine Negative Negative      Barbiturates, Urine Negative Negative      Benzodiazepines, Urine Negative Negative      Cocaine, Urine Negative Negative      Methadone, Urine Negative Negative      Opiates, Urine Negative Negative      Phencyclidine, Urine Negative Negative      THC, TH-Cannabinol, Urine Positive (A) Negative      Comments:        This test is a screen for drugs of abuse in a medical

## 2024-07-29 NOTE — GROUP NOTE
Group Therapy Note    Date: 7/29/2024    Group Start Time: 1555  Group End Time: 1635  Group Topic: Recreational    SSR 2  NON ACUTE    Neeru Grace        Group Therapy Note    Facilitated leisure skills group to reinforce positive coping and to manage mood through music, social interaction, group activities and art task       Attendees: 4/8       Patient's Goal:  Client will learn and demonstrate improved boundaries    Notes:  Pt was receptive to listening to music while working on leisure task. Interacted with peer and staff. Pt apologize to writer for her behavior in the morning group.  Pt was pulled out of group to meet with     Status After Intervention:  Some Improvement    Participation Level: Active Listener and Interactive    Participation Quality: Appropriate and Attentive      Speech:  normal      Thought Process/Content: Logical      Affective Functioning: Congruent      Mood:  Calm      Level of consciousness:  Attentive      Response to Learning: Progressing to goal      Endings: None Reported    Modes of Intervention: Socialization and Activity      Discipline Responsible: Recreational Therapist      Signature:  PAPA Brooks
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
## 2024-07-29 NOTE — BSMART NOTE
Patient accepted by [facility]/Dr. Carcamo to bed 237-2. [place] once change of facility form arrives as patient is under TDO. MARIA R Thornton and Charlotte KAUFMAN with patient aware.

## 2024-07-29 NOTE — PLAN OF CARE
Problem: Anxiety  Goal: Will report anxiety at manageable levels  Description: INTERVENTIONS:  1. Administer medication as ordered  2. Teach and rehearse alternative coping skills  3. Provide emotional support with 1:1 interaction with staff  Outcome: Progressing     Problem: Coping  Goal: Pt/Family able to verbalize concerns and demonstrate effective coping strategies  Description: INTERVENTIONS:  1. Assist patient/family to identify coping skills, available support systems and cultural and spiritual values  2. Provide emotional support, including active listening and acknowledgement of concerns of patient and caregivers  3. Reduce environmental stimuli, as able  4. Instruct patient/family in relaxation techniques, as appropriate  5. Assess for spiritual pain/suffering and initiate Spiritual Care, Psychosocial Clinical Specialist consults as needed  Outcome: Progressing     Problem: Behavior  Goal: Pt/Family maintain appropriate behavior and adhere to behavioral management agreement, if implemented  Description: INTERVENTIONS:  1. Assess patient/family's coping skills and  non-compliant behavior (including use of illegal substances)  2. Notify security of behavior or suspected illegal substances which indicate the need for search of the family and/or belongings  3. Encourage verbalization of thoughts and concerns in a socially appropriate manner  4. Utilize positive, consistent limit setting strategies supporting safety of patient, staff and others  5. Encourage participation in the decision making process about the behavioral management agreement  6. If a visitor's behavior poses a threat to safety call refer to organization policy.  7. Initiate consult with , Psychosocial CNS, Spiritual Care as appropriate  Outcome: Progressing     Problem: Involuntary Admit  Goal: Will cooperate with staff recommendations and doctor's orders and will demonstrate appropriate behavior  Description:

## 2024-07-29 NOTE — PROGRESS NOTES
Client is pleasant upon approach.Alert and oriented x 3.She has a good appetite and reports sleeping well.Denies any thoughts to harm herself.Asking that she be discharged today.Reports that her mother and grandmother got her here under false pretense.Verbalizes that no one ever listens to her.Reports that her own mom does not understand or know her.Believes people are taping her and watching everything she does.Reports everyone wants to control her life.She is paranoid and guarded.Minimum interaction noted between peers.Remains on close observation for safety.

## 2024-07-29 NOTE — GROUP NOTE
Group Therapy Note    Date: 7/29/2024    Group Start Time: 1130  Group End Time: 1200  Group Topic: Education Group - Inpatient    SSR 2  NON ACUTE    Neeru Grace        Group Therapy Note    Facilitated discussion focus on identifying different barriers that has prevented progress and identifying ways to confront them       Attendees: 4/10       Patient's Goal:  Attend group daily     Notes: Pt attended the entire group session. Declined to engage in discussion    Status After Intervention:  Unchanged    Participation Level: Minimal    Participation Quality: Resistant      Speech:  normal      Thought Process/Content: Logical      Affective Functioning: Blunted and Flat      Mood: irritable      Level of consciousness:  Alert and Preoccupied      Response to Learning: Resistant      Endings: None Reported    Modes of Intervention: Education and Support      Discipline Responsible: Recreational Therapist      Signature:  PAPA Brooks

## 2024-07-30 LAB
ALBUMIN SERPL-MCNC: 4.3 G/DL (ref 3.5–5)
ALBUMIN/GLOB SERPL: 1 (ref 1.1–2.2)
ALP SERPL-CCNC: 60 U/L (ref 45–117)
ALT SERPL-CCNC: 18 U/L (ref 12–78)
ANION GAP SERPL CALC-SCNC: 7 MMOL/L (ref 5–15)
AST SERPL W P-5'-P-CCNC: 17 U/L (ref 15–37)
BILIRUB SERPL-MCNC: 1 MG/DL (ref 0.2–1)
BUN SERPL-MCNC: 12 MG/DL (ref 6–20)
BUN/CREAT SERPL: 11 (ref 12–20)
CA-I BLD-MCNC: 9.8 MG/DL (ref 8.5–10.1)
CHLORIDE SERPL-SCNC: 105 MMOL/L (ref 97–108)
CO2 SERPL-SCNC: 22 MMOL/L (ref 21–32)
CREAT SERPL-MCNC: 1.14 MG/DL (ref 0.55–1.02)
GLOBULIN SER CALC-MCNC: 4.4 G/DL (ref 2–4)
GLUCOSE SERPL-MCNC: 73 MG/DL (ref 65–100)
POTASSIUM SERPL-SCNC: 3.7 MMOL/L (ref 3.5–5.1)
PROT SERPL-MCNC: 8.7 G/DL (ref 6.4–8.2)
SODIUM SERPL-SCNC: 134 MMOL/L (ref 136–145)
TROPONIN I SERPL HS-MCNC: <4 NG/L (ref 0–51)
TROPONIN I SERPL HS-MCNC: <4 NG/L (ref 0–51)

## 2024-07-30 PROCEDURE — 36415 COLL VENOUS BLD VENIPUNCTURE: CPT

## 2024-07-30 PROCEDURE — 93005 ELECTROCARDIOGRAM TRACING: CPT | Performed by: INTERNAL MEDICINE

## 2024-07-30 PROCEDURE — 6370000000 HC RX 637 (ALT 250 FOR IP): Performed by: PSYCHIATRY & NEUROLOGY

## 2024-07-30 PROCEDURE — 86361 T CELL ABSOLUTE COUNT: CPT

## 2024-07-30 PROCEDURE — 99222 1ST HOSP IP/OBS MODERATE 55: CPT | Performed by: INTERNAL MEDICINE

## 2024-07-30 PROCEDURE — 84484 ASSAY OF TROPONIN QUANT: CPT

## 2024-07-30 PROCEDURE — 86592 SYPHILIS TEST NON-TREP QUAL: CPT

## 2024-07-30 PROCEDURE — 1240000000 HC EMOTIONAL WELLNESS R&B

## 2024-07-30 PROCEDURE — 80053 COMPREHEN METABOLIC PANEL: CPT

## 2024-07-30 PROCEDURE — 87536 HIV-1 QUANT&REVRSE TRNSCRPJ: CPT

## 2024-07-30 RX ORDER — QUETIAPINE FUMARATE 100 MG/1
100 TABLET, FILM COATED ORAL NIGHTLY
Status: DISCONTINUED | OUTPATIENT
Start: 2024-07-30 | End: 2024-07-31

## 2024-07-30 RX ADMIN — QUETIAPINE 100 MG: 100 TABLET ORAL at 21:35

## 2024-07-30 RX ADMIN — SERTRALINE HYDROCHLORIDE 25 MG: 25 TABLET ORAL at 08:41

## 2024-07-30 RX ADMIN — BICTEGRAVIR SODIUM, EMTRICITABINE, AND TENOFOVIR ALAFENAMIDE FUMARATE 1 TABLET: 50; 200; 25 TABLET ORAL at 08:41

## 2024-07-30 NOTE — PROGRESS NOTES
Progress Note  Date:2024       Room:[facility]  Patient Name:Melissa Ham     YOB: 2000     Age:24 y.o.        Subjective    Subjective patient presents labile easily irritable, paranoid and guarded.  Patient was able to engage in conversation but often states this engages and becomes irritable.    Patient continues to have poor insight into her reason for hospitalization.  She is able to displace blame on her mom and grandma asked.  Patient has been using substances, noncompliant with medication.  She presents with paranoia, believes that people were out to get her.  She has made threatening voicemail messages to her mom.    Mental status examination-patient is alert and oriented to name place.  Able to engage in conversation coherently.  Impaired insight  Mood dysphoric.  Thought process not logical  Thought content paranoia no active SI or HI voiced.  Memory clinically no impairment noted  Insight is impaired judgment is impaired.  Is ready to go and likes to be discharged.  Patient was explained about her TD  Review of Systems  Objective         Vitals Last 24 Hours:  TEMPERATURE:  Temp  Av.1 °F (36.7 °C)  Min: 98.1 °F (36.7 °C)  Max: 98.1 °F (36.7 °C)  RESPIRATIONS RANGE: Resp  Av  Min: 18  Max: 18  PULSE OXIMETRY RANGE: No data recorded  PULSE RANGE: Pulse  Av  Min: 68  Max: 68  BLOOD PRESSURE RANGE: Systolic (24hrs), Av , Min:120 , Max:120   ; Diastolic (24hrs), Av, Min:94, Max:94    I/O (24Hr):  No intake or output data in the 24 hours ending 248  Objective  Labs/Imaging/Diagnostics    Labs:  CBC:  Recent Labs     24  1030   WBC 5.3   RBC 3.75*   HGB 12.5   HCT 37.7   .5*   RDW 12.3        CHEMISTRIES:  Recent Labs     24  1030      K 3.8      CO2 23   BUN 14   CREATININE 1.06*   GLUCOSE 90     PT/INR:No results for input(s): \"PROTIME\", \"INR\" in the last 72 hours.  APTT:No results for input(s): \"APTT\" in the last 72

## 2024-07-30 NOTE — PLAN OF CARE
Problem: Nutrition Deficit:  Goal: Optimize nutritional status  Outcome: Progressing  Flowsheets (Taken 7/30/2024 1325)  Nutrient intake appropriate for improving, restoring, or maintaining nutritional needs:   Assess nutritional status and recommend course of action   Recommend appropriate diets, oral nutritional supplements, and vitamin/mineral supplements   Monitor oral intake, labs, and treatment plans

## 2024-07-30 NOTE — GROUP NOTE
Group Therapy Note    Date: 7/30/2024    Group Start Time: 1515  Group End Time: 1605  Group Topic: Recreational    SSR 2 BH NON ACUTE    Jaqui Valdez        Group Therapy Note    Attendees: 3/6    Recreational Therapist facilitated structured leisure skills group to introduce healthy leisure skills as positive way to cope and manage mood.         Patient's Goal:  Client will learn and demonstrate improved bourndaries     Notes:  Attended group and listened to songs with peers.  Pt was receptive to intervention and responded to prompts from staff. Attended entire session and was attentive during group.       Status After Intervention:  Improved    Participation Level: Active Listener    Participation Quality: Appropriate      Speech:  normal      Thought Process/Content: Logical      Affective Functioning: Congruent      Mood:  calm       Level of consciousness:  Alert      Response to Learning: Progressing to goal      Endings: None Reported    Modes of Intervention: Activity      Discipline Responsible: Recreational Therapist      Signature:  PAPA Bell

## 2024-07-30 NOTE — PROGRESS NOTES
Comprehensive Nutrition Assessment    Type and Reason for Visit:  Initial, Positive Nutrition Screen (BMI 17.1)    Nutrition Recommendations/Plan:   Continue diet as ordered (Reg with double portions)  Update food preferences - hope to see pt eating % of meals with preferences honored)  Consider ONS if needed to meet nutritional needs     Malnutrition Assessment:  Malnutrition Status:  At risk for malnutrition (Comment) (07/30/24 1230)    Context:  Chronic Illness     Findings of the 6 clinical characteristics of malnutrition:  Energy Intake:  No significant decrease in energy intake  Weight Loss:  No significant weight loss (Pt states weight \"normal\". Usually range 105-120#))     Body Fat Loss:        Muscle Mass Loss:  Unable to assess    Fluid Accumulation:  Unable to assess     Strength:  Not Performed    Nutrition Assessment:    Pt seen today per positive nutrition screening due to low BMI (17.1). Pt adm with bipolar disorder, psychosis. Meds and labs reviewed. Pt states her present weight is \"normal\". Eating fair ~25%-50%+. pt states knee pain causing low appetite. updated food preferences today. Pt does get to choose daily menus. Pt on Regular diet with double portions.    Nutrition Related Findings:    NFPE deferred. Pt appears thin but wt stable. Last BM 7/29. Wound Type: None       Current Nutrition Intake & Therapies:    Average Meal Intake: 26-50%  Average Supplements Intake: None Ordered  ADULT DIET; Regular    Anthropometric Measures:  Height: 170.2 cm (5' 7.01\")  Ideal Body Weight (IBW): 135 lbs (61 kg)       Current Body Weight: 49.4 kg (108 lb 14.5 oz), 80.7 % IBW. Weight Source: Stated  Current BMI (kg/m2): 17.1        Weight Adjustment For: No Adjustment                 BMI Categories: Underweight (BMI less than 18.5)    Estimated Daily Nutrient Needs:  Energy Requirements Based On: Kcal/kg  Weight Used for Energy Requirements: Current  Energy (kcal/day): 1800-2000kcals/d (35-40kcals/kg

## 2024-07-30 NOTE — CONSULTS
follow up w me for HIV infection as out pt and she appeared receptive         Continue on Biktarvy, will need refills upon d/c    2. Bipolar 1 d/o: Admitted w acute exacerbation      Manic, aggressive, paranoid affect on presentation       Mgt per psych     Dispo: Pt states she will like to be discharged home on 07/31          Crista Long MD     7/30/2024

## 2024-07-30 NOTE — GROUP NOTE
Group Therapy Note    Date: 7/30/2024    Group Start Time: 1315  Group End Time: 1400  Group Topic: Process Group - Inpatient    SSR 2 [facility] ACUTE    Mackenzie Briscoe        Group Therapy Note  Process group was focused on self care. Writer asked the group to describe their mood using a color. Writer provided the pts with self care ideas and shared how they take care of themselves. Some pts interacted well with one another but others had a difficult time due to delusional thoughts.   Attendees: 3-6       Patient's Goal:  \"to stay positive\"    Notes:  pt interacted well with others and provided positive feedback. Her thoughts were logical and organized but at times paranoid. Pt appeared to acknowledge her thoughts and redirect herself. She shared that she likes to do adventurous activities but worries about being alone. Pt stated \"the hardest part is just leaving the house and getting out there\"     Status After Intervention:  Improved    Participation Level: Active Listener and Interactive    Participation Quality: Appropriate, Attentive, and Sharing      Speech:  normal      Thought Process/Content: Logical      Affective Functioning: Congruent      Mood: anxious      Level of consciousness:  Alert, Oriented x4, and Attentive      Response to Learning: Able to verbalize current knowledge/experience, Able to verbalize/acknowledge new learning, Able to retain information, Capable of insight, and Progressing to goal      Endings: None Reported    Modes of Intervention: Education, Support, Socialization, and Exploration      Discipline Responsible: /Counselor      Signature:  Mackenzie Briscoe

## 2024-07-30 NOTE — PROGRESS NOTES
Progress Note  Date:2024       Room:[facility]  Patient Name:Melissa Ham     YOB: 2000     Age:24 y.o.        Subjective    Subjective     Patient inquired about CD4 count and was notified that infectious disease would be assisting her. Patient states that her sleeping and eating have been fine. When talking about her symptoms, she appears to get agitated and continues to blame her mother. Insight is lacking. She claims to have eye surgery performed and claims that there is a picture stuck in her eye. Patient has continued to voice her opinions on leaving and continuously denies that she has schizophrenic symptoms.   Review of Systems  Objective         Vitals Last 24 Hours:  TEMPERATURE:  Temp  Av.1 °F (36.7 °C)  Min: 98.1 °F (36.7 °C)  Max: 98.1 °F (36.7 °C)  RESPIRATIONS RANGE: Resp  Av  Min: 18  Max: 18  PULSE OXIMETRY RANGE: SpO2  Av %  Min: 100 %  Max: 100 %  PULSE RANGE: Pulse  Av  Min: 68  Max: 71  BLOOD PRESSURE RANGE: Systolic (24hrs), Av , Min:114 , Max:120   ; Diastolic (24hrs), Av, Min:80, Max:94    I/O (24Hr):  No intake or output data in the 24 hours ending 24 1057  Objective  Labs/Imaging/Diagnostics    Labs:  CBC:No results for input(s): \"WBC\", \"RBC\", \"HGB\", \"HCT\", \"MCV\", \"RDW\", \"PLT\" in the last 72 hours.  CHEMISTRIES:No results for input(s): \"NA\", \"K\", \"CL\", \"CO2\", \"BUN\", \"CREATININE\", \"GLUCOSE\", \"PHOS\", \"MG\" in the last 72 hours.    Invalid input(s): \"CA\"  PT/INR:No results for input(s): \"PROTIME\", \"INR\" in the last 72 hours.  APTT:No results for input(s): \"APTT\" in the last 72 hours.  LIVER PROFILE:No results for input(s): \"AST\", \"ALT\", \"BILIDIR\", \"BILITOT\", \"ALKPHOS\" in the last 72 hours.    Imaging Last 24 Hours:  No results found.  Assessment//Plan           Increase Quetiapine, 50mg, oral, nightly to 100 mg po bedtime    Hospital Problems             Last Modified POA    * (Principal) Hx of bipolar disorder 2024 Yes    Bipolar 1

## 2024-07-31 LAB
BASOPHILS # BLD AUTO: 0 X10E3/UL (ref 0–0.2)
BASOPHILS NFR BLD AUTO: 1 %
CD3+CD4+ CELLS # BLD: 928 /UL (ref 359–1519)
CD3+CD4+ CELLS NFR BLD: 44.2 % (ref 30.8–58.5)
EKG ATRIAL RATE: 70 BPM
EKG DIAGNOSIS: NORMAL
EKG P AXIS: 26 DEGREES
EKG P-R INTERVAL: 130 MS
EKG Q-T INTERVAL: 380 MS
EKG QRS DURATION: 86 MS
EKG QTC CALCULATION (BAZETT): 410 MS
EKG R AXIS: 69 DEGREES
EKG T AXIS: 74 DEGREES
EKG VENTRICULAR RATE: 70 BPM
EOSINOPHIL # BLD AUTO: 0.1 X10E3/UL (ref 0–0.4)
EOSINOPHIL NFR BLD AUTO: 2 %
ERYTHROCYTE [DISTWIDTH] IN BLOOD BY AUTOMATED COUNT: 12.6 % (ref 11.7–15.4)
HCT VFR BLD AUTO: 40.5 % (ref 34–46.6)
HGB BLD-MCNC: 13.6 G/DL (ref 11.1–15.9)
HIV1 RNA # SERPL NAA+PROBE: <20 COPIES/ML
HIV1 RNA SERPL NAA+PROBE-LOG#: NORMAL LOG10COPY/ML
IMM GRANULOCYTES # BLD: 0 X10E3/UL (ref 0–0.1)
IMM GRANULOCYTES NFR BLD: 1 %
IMMATURE CELLS: ABNORMAL
LYMPHOCYTES # BLD AUTO: 2.1 X10E3/UL (ref 0.7–3.1)
LYMPHOCYTES NFR BLD AUTO: 56 %
MCH RBC QN AUTO: 33.1 PG (ref 26.6–33)
MCHC RBC AUTO-ENTMCNC: 33.6 G/DL (ref 31.5–35.7)
MCV RBC AUTO: 99 FL (ref 79–97)
MONOCYTES # BLD AUTO: 0.2 X10E3/UL (ref 0.1–0.9)
MONOCYTES NFR BLD AUTO: 6 %
MORPHOLOGY BLD-IMP: ABNORMAL
NEUTROPHILS # BLD AUTO: 1.3 X10E3/UL (ref 1.4–7)
NEUTROPHILS NFR BLD AUTO: 34 %
NRBC BLD AUTO-RTO: ABNORMAL %
PLATELET # BLD AUTO: 292 X10E3/UL (ref 150–450)
RBC # BLD AUTO: 4.11 X10E6/UL (ref 3.77–5.28)
WBC # BLD AUTO: 3.8 X10E3/UL (ref 3.4–10.8)

## 2024-07-31 PROCEDURE — 1240000000 HC EMOTIONAL WELLNESS R&B

## 2024-07-31 PROCEDURE — 99232 SBSQ HOSP IP/OBS MODERATE 35: CPT | Performed by: INTERNAL MEDICINE

## 2024-07-31 PROCEDURE — 6370000000 HC RX 637 (ALT 250 FOR IP): Performed by: PSYCHIATRY & NEUROLOGY

## 2024-07-31 RX ORDER — ZIPRASIDONE HYDROCHLORIDE 20 MG/1
20 CAPSULE ORAL 2 TIMES DAILY WITH MEALS
Status: DISCONTINUED | OUTPATIENT
Start: 2024-07-31 | End: 2024-08-02

## 2024-07-31 RX ORDER — QUETIAPINE FUMARATE 25 MG/1
50 TABLET, FILM COATED ORAL NIGHTLY
Status: DISCONTINUED | OUTPATIENT
Start: 2024-07-31 | End: 2024-08-01

## 2024-07-31 RX ADMIN — ZIPRASIDONE HYDROCHLORIDE 20 MG: 20 CAPSULE ORAL at 17:39

## 2024-07-31 RX ADMIN — QUETIAPINE FUMARATE 50 MG: 25 TABLET ORAL at 21:11

## 2024-07-31 RX ADMIN — SERTRALINE HYDROCHLORIDE 25 MG: 25 TABLET ORAL at 09:03

## 2024-07-31 RX ADMIN — BICTEGRAVIR SODIUM, EMTRICITABINE, AND TENOFOVIR ALAFENAMIDE FUMARATE 1 TABLET: 50; 200; 25 TABLET ORAL at 09:03

## 2024-07-31 NOTE — PLAN OF CARE
Problem: Behavior  Goal: Pt/Family maintain appropriate behavior and adhere to behavioral management agreement, if implemented  Description: INTERVENTIONS:  1. Assess patient/family's coping skills and  non-compliant behavior (including use of illegal substances)  2. Notify security of behavior or suspected illegal substances which indicate the need for search of the family and/or belongings  3. Encourage verbalization of thoughts and concerns in a socially appropriate manner  4. Utilize positive, consistent limit setting strategies supporting safety of patient, staff and others  5. Encourage participation in the decision making process about the behavioral management agreement  6. If a visitor's behavior poses a threat to safety call refer to organization policy.  7. Initiate consult with , Psychosocial CNS, Spiritual Care as appropriate  7/31/2024 0954 by Isela Forrester, RN  Outcome: Progressing  7/30/2024 2249 by Rachel Rutherford RN  Outcome: Progressing     Problem: Coping  Goal: Pt/Family able to verbalize concerns and demonstrate effective coping strategies  Description: INTERVENTIONS:  1. Assist patient/family to identify coping skills, available support systems and cultural and spiritual values  2. Provide emotional support, including active listening and acknowledgement of concerns of patient and caregivers  3. Reduce environmental stimuli, as able  4. Instruct patient/family in relaxation techniques, as appropriate  5. Assess for spiritual pain/suffering and initiate Spiritual Care, Psychosocial Clinical Specialist consults as needed  Outcome: Progressing     Problem: Anxiety  Goal: Will report anxiety at manageable levels  Description: INTERVENTIONS:  1. Administer medication as ordered  2. Teach and rehearse alternative coping skills  3. Provide emotional support with 1:1 interaction with staff  7/31/2024 0954 by Isela Forrester, RN  Outcome: Progressing  7/30/2024 2249 by

## 2024-07-31 NOTE — PROGRESS NOTES
Infectious Disease Progress Note           Subjective:   Pt seen in the psych unit, doing well, denies new complaints, remains anxious about d/c. Up dated on lab results   Objective:   Physical Exam:     /65 Comment: input incorrectly by previous shift  Pulse 71   Temp 98.2 °F (36.8 °C) (Oral)   Resp 18   Ht 1.702 m (5' 7.01\")   Wt 49.4 kg (109 lb)   SpO2 98%   BMI 17.07 kg/m²    O2 Device: None (Room air)    Temp (24hrs), Av.3 °F (36.8 °C), Min:98.2 °F (36.8 °C), Max:98.4 °F (36.9 °C)    No intake/output data recorded.   No intake/output data recorded.    General: NAD, AAO x 4  HEENT: MARTINE, Moist mucosa   Lungs: CTA b/l, decreased at the bases   Heart: S1S2+, RRR, no murmur  Abdo: Soft, NT, ND, +BS   : no cortez cath   Exts: No edema, + pulses b/l   Skin: No wounds, No rashes or lesions    Data Review:       Recent Days:    Recent Labs     24  1204   WBC 3.8   HGB 13.6   HCT 40.5        Recent Labs     24  1204   BUN 12   CREATININE 1.14*       No results found for: \"CRP\"       Microbiology     Results       ** No results found for the last 336 hours. **             Diagnostic   No results found.    Assessment/Plan     HIV infection no h/o AIDS associated opportunistic infection        Controlled on  Biktarvy w HIV RNA VL < 20 copies/ml and T cell count 928 (44.2%)         Qualitative HIV RNA is pending, will follow and up date once available         Re-interated to pt importance of compliance w cART         She can follow up me as out pt for HIV mgt.        No further work up planned on current admission      2. Bipolar 1 d/o: Admitted w acute exacerbation      Manic, aggressive, paranoid affect on presentation       Mgt per psych      Crista Long MD    2024

## 2024-07-31 NOTE — GROUP NOTE
Group Therapy Note    Date: 7/31/2024    Group Start Time: 1430  Group End Time: 1500  Group Topic: Nursing    SSR 2  NON ACUTE    Kamini Jane RN        Group Therapy Note    Attendees: 3/5       Patient's Goal:  Learn importance of good sleep hygiene. Tip sheet and word search provided.    Notes:        Status After Intervention:  Improved    Participation Level: Active Listener and Interactive    Participation Quality: Appropriate      Speech:  normal      Thought Process/Content: Logical      Affective Functioning: Congruent      Mood: euthymic      Level of consciousness:  Alert and Oriented x4      Response to Learning: Able to verbalize current knowledge/experience and Able to verbalize/acknowledge new learning      Endings: None Reported    Modes of Intervention: Education      Discipline Responsible: Registered Nurse      Signature:  Kamini Jane RN

## 2024-07-31 NOTE — PROGRESS NOTES
Progress Note  Date:2024       Room:[facility]  Patient Name:Melissa Ham     YOB: 2000     Age:24 y.o.        Subjective    Subjective   Patient appears to be more calm today but continues to be agitated and continues to note fluctuations in her mood and her thought process.  She continues to present paranoid not taking accountability actions she used to blame other family members for her being here in the hospital.  She has reported having visions and hearing people's thoughts.       She claims that she has not been eating well because the \"food tastes bad.\" No SI/HI ideations. Patient expresses how she wants to leave to start school and get a job and has minimal insight into her mental health illness.    Mental status examination-patient is alert slightly more calm still presents delusional and paranoid continues to blame her family.  She is dismissive as she believes other people in the family needs to be treated not her.  Continues to have impaired insight and judgment.    Review of Systems  Objective         Vitals Last 24 Hours:  TEMPERATURE:  Temp  Av.3 °F (36.8 °C)  Min: 98.2 °F (36.8 °C)  Max: 98.4 °F (36.9 °C)  RESPIRATIONS RANGE: Resp  Av.3  Min: 17  Max: 18  PULSE OXIMETRY RANGE: SpO2  Av %  Min: 98 %  Max: 98 %  PULSE RANGE: Pulse  Av.7  Min: 71  Max: 93  BLOOD PRESSURE RANGE: Systolic (24hrs), Av , Min:102 , Max:119   ; Diastolic (24hrs), Av, Min:65, Max:86    I/O (24Hr):  No intake or output data in the 24 hours ending 24 1515  Objective  Labs/Imaging/Diagnostics    Labs:  CBC:  Recent Labs     24  1204   WBC 3.8   RBC 4.11   HGB 13.6   HCT 40.5   MCV 99*   RDW 12.6        CHEMISTRIES:  Recent Labs     24  1204   *   K 3.7      CO2 22   BUN 12   CREATININE 1.14*   GLUCOSE 73     PT/INR:No results for input(s): \"PROTIME\", \"INR\" in the last 72 hours.  APTT:No results for input(s): \"APTT\" in the last 72 hours.  LIVER

## 2024-07-31 NOTE — PLAN OF CARE
Problem: Anxiety  Goal: Will report anxiety at manageable levels  Description: INTERVENTIONS:  1. Administer medication as ordered  2. Teach and rehearse alternative coping skills  3. Provide emotional support with 1:1 interaction with staff  7/30/2024 2249 by Rachel Rutherford RN  Outcome: Progressing  7/30/2024 0851 by Janiya Bishop, RN  Outcome: Progressing     Problem: Behavior  Goal: Pt/Family maintain appropriate behavior and adhere to behavioral management agreement, if implemented  Description: INTERVENTIONS:  1. Assess patient/family's coping skills and  non-compliant behavior (including use of illegal substances)  2. Notify security of behavior or suspected illegal substances which indicate the need for search of the family and/or belongings  3. Encourage verbalization of thoughts and concerns in a socially appropriate manner  4. Utilize positive, consistent limit setting strategies supporting safety of patient, staff and others  5. Encourage participation in the decision making process about the behavioral management agreement  6. If a visitor's behavior poses a threat to safety call refer to organization policy.  7. Initiate consult with , Psychosocial CNS, Spiritual Care as appropriate  7/30/2024 2249 by Rachel Rutherford, RN  Outcome: Progressing  7/30/2024 0851 by Janiya Bishop, RN  Outcome: Progressing

## 2024-07-31 NOTE — GROUP NOTE
Group Therapy Note    Date: 7/31/2024    Group Start Time: 1100  Group End Time: 1130  Group Topic: Process Group - Inpatient    [facility] 2 [facility]    Desirae Flores        Group Therapy Note: This writer facilitated a group where each individual was provided with a handout on \"toot your own horn\" where they shared their responses and provided each other with positive feedback.     Attendees: 4       Patient's Goal:  to attend groups    Notes:  Pt able to identify that she is a good  and a good role model and that the people she admires the most are the women in her family because they have taught her how to be strong.     Status After Intervention:  Improved    Participation Level: Active Listener and Interactive    Participation Quality: Appropriate, Attentive, and Sharing      Speech:  normal      Thought Process/Content: Logical  Linear      Affective Functioning: Congruent      Mood: calm      Level of consciousness:  Alert, Oriented x4, and Attentive      Response to Learning: Able to verbalize current knowledge/experience, Able to verbalize/acknowledge new learning, Able to retain information, Capable of insight, Able to change behavior, and Progressing to goal      Endings: None Reported    Modes of Intervention: Education, Support, and Socialization      Discipline Responsible: /Counselor      Signature:  Desirae Flores

## 2024-08-01 LAB — RPR SER QL: NON REACTIVE

## 2024-08-01 PROCEDURE — 6370000000 HC RX 637 (ALT 250 FOR IP): Performed by: PSYCHIATRY & NEUROLOGY

## 2024-08-01 PROCEDURE — 1240000000 HC EMOTIONAL WELLNESS R&B

## 2024-08-01 RX ADMIN — TRAZODONE HYDROCHLORIDE 50 MG: 50 TABLET ORAL at 20:52

## 2024-08-01 RX ADMIN — BICTEGRAVIR SODIUM, EMTRICITABINE, AND TENOFOVIR ALAFENAMIDE FUMARATE 1 TABLET: 50; 200; 25 TABLET ORAL at 08:50

## 2024-08-01 RX ADMIN — ZIPRASIDONE HYDROCHLORIDE 20 MG: 20 CAPSULE ORAL at 08:50

## 2024-08-01 RX ADMIN — ZIPRASIDONE HYDROCHLORIDE 20 MG: 20 CAPSULE ORAL at 17:24

## 2024-08-01 NOTE — GROUP NOTE
Group Therapy Note    Date: 7/31/2024    Group Start Time: 1845  Group End Time: 1930  Group Topic: Recreational    SSR 2 [facility] NON ACUTE    Jaqui Valdez        Group Therapy Note    Attendees: 4/6        Recreational Therapist facilitated structured leisure skills group to introduce healthy leisure skills as positive way to cope and manage mood.               Patient's Goal:  Client will learn and demonstrate improved bourndaries     Notes:  Attended group and listened to songs with peers.  Pt was receptive to intervention and responded to prompts from staff. Attended entire session and was attentive during group     Status After Intervention:  Improved    Participation Level: Active Listener    Participation Quality: Appropriate      Speech:  normal      Thought Process/Content: Linear      Affective Functioning: Congruent      Mood:  calm      Level of consciousness:  Alert      Response to Learning: Progressing to goal      Endings: None Reported    Modes of Intervention: Activity      Discipline Responsible: Recreational Therapist      Signature:  PAPA Bell

## 2024-08-01 NOTE — GROUP NOTE
Group Therapy Note    Date: 8/1/2024    Group Start Time: 1120  Group End Time: 1150  Group Topic: Process Group - Inpatient    [facility] 2 [facility]    Nidia Boggs MSW        Group Therapy Note: Facilitator engaged the group in therapeutic discussion about \"the four agreements\" to personal freedom: Be impeccable with your word, don't take anything personally, don't make assumptions, and always do your best. The group also practiced how to use SMART goal methodology.     Attendees: 5       Patient's Goal:  To attend groups    Notes:  Pt came to group after storming out of meeting with attending physician. Pt presents child-like and immature, swearing and complaining that no one cares and \"its all manipulation\".    Status After Intervention:  Unchanged    Participation Level: Minimal    Participation Quality: Inappropriate and Resistant      Speech:  loud      Thought Process/Content: Perseverating      Affective Functioning: Exaggerated      Mood: angry and dysphoric      Level of consciousness:  Alert, Oriented x4, and Inattentive      Response to Learning: Able to verbalize current knowledge/experience and Able to change behavior      Endings: None Reported    Modes of Intervention: Education, Support, and Socialization      Discipline Responsible: /Counselor      Signature:  YASMANI ALATORRE

## 2024-08-01 NOTE — PROGRESS NOTES
Progress Note  Date:2024       [place] [place] [place]  Patient Name:Melissa Ham     YOB: 2000     Age:24 y.o.        Subjective    Subjective   Patient appeared calm at first but became quickly agitated when talking about wanting to leave. She describes that she is able to tolerate the medications. She states that she is able to sleep well but continues not to eat well because she \"does not like the food.\" Patient continues to blame others, including her mother and grandmother. Patient continues to lack insight and judgement. Patient does not have any SI/HI ideations at this time. Patient stormed out of the room before the conversation was finished.     Review of Systems  Objective         Vitals Last 24 Hours:  TEMPERATURE:  Temp  Av.6 °F (37 °C)  Min: 98.1 °F (36.7 °C)  Max: 99 °F (37.2 °C)  RESPIRATIONS RANGE: Resp  Av  Min: 16  Max: 18  PULSE OXIMETRY RANGE: No data recorded  PULSE RANGE: Pulse  Av.5  Min: 71  Max: 78  BLOOD PRESSURE RANGE: Systolic (24hrs), Av , Min:111 , Max:123   ; Diastolic (24hrs), Av, Min:79, Max:79    I/O (24Hr):  No intake or output data in the 24 hours ending 24 1455  Objective  Labs/Imaging/Diagnostics    Labs:  CBC:  Recent Labs     24  1204   WBC 3.8   RBC 4.11   HGB 13.6   HCT 40.5   MCV 99*   RDW 12.6        CHEMISTRIES:  Recent Labs     24  1204   *   K 3.7      CO2 22   BUN 12   CREATININE 1.14*   GLUCOSE 73     PT/INR:No results for input(s): \"PROTIME\", \"INR\" in the last 72 hours.  APTT:No results for input(s): \"APTT\" in the last 72 hours.  LIVER PROFILE:  Recent Labs     24  1204   AST 17   ALT 18   BILITOT 1.0   ALKPHOS 60       Imaging Last 24 Hours:  No results found.  Assessment//Plan         Discontinue Seroquel 50 at bedtime  Patient is complaining of feeling tired.     Patient continued to stay focused on discharge.  She becomes irritable and agitated and easily labile.  Continues to have

## 2024-08-01 NOTE — PLAN OF CARE
Problem: Involuntary Admit  Goal: Will cooperate with staff recommendations and doctor's orders and will demonstrate appropriate behavior  Description: INTERVENTIONS:  1. Treat underlying conditions and offer medication as ordered  2. Educate regarding involuntary admission procedures and rules  3. Contain excessive/inappropriate behavior per unit and hospital policies  Outcome: Progressing     Problem: Behavior  Goal: Pt/Family maintain appropriate behavior and adhere to behavioral management agreement, if implemented  Description: INTERVENTIONS:  1. Assess patient/family's coping skills and  non-compliant behavior (including use of illegal substances)  2. Notify security of behavior or suspected illegal substances which indicate the need for search of the family and/or belongings  3. Encourage verbalization of thoughts and concerns in a socially appropriate manner  4. Utilize positive, consistent limit setting strategies supporting safety of patient, staff and others  5. Encourage participation in the decision making process about the behavioral management agreement  6. If a visitor's behavior poses a threat to safety call refer to organization policy.  7. Initiate consult with , Psychosocial CNS, Spiritual Care as appropriate  Outcome: Progressing     Problem: Coping  Goal: Pt/Family able to verbalize concerns and demonstrate effective coping strategies  Description: INTERVENTIONS:  1. Assist patient/family to identify coping skills, available support systems and cultural and spiritual values  2. Provide emotional support, including active listening and acknowledgement of concerns of patient and caregivers  3. Reduce environmental stimuli, as able  4. Instruct patient/family in relaxation techniques, as appropriate  5. Assess for spiritual pain/suffering and initiate Spiritual Care, Psychosocial Clinical Specialist consults as needed  8/1/2024 1137 by Isela Forrester RN  Outcome:

## 2024-08-01 NOTE — GROUP NOTE
Group Therapy Note    Date: 8/1/2024    Group Start Time: 0945  Group End Time: 1025  Group Topic: Education Group - Inpatient    SSR 2  NON ACUTE    Neeru Grace        Group Therapy Note    Facilitated group to focus on defining different types of defense mechanisms and being able to recognize examples of some defenses that was used to cope with stress and anxiety       Attendees: 5/6       Patient's Goal:  Client will learn and demonstrate improved boundaries    Notes: Receptive to information discussed and engaged. Was able to recognize the examples of different types of defenses and shared some of them that applied to her    Status After Intervention:  Improved    Participation Level: Active Listener and Interactive    Participation Quality: Appropriate, Attentive, and Sharing      Speech:  normal      Thought Process/Content: Logical      Affective Functioning: Congruent      Mood:  Calm      Level of consciousness:  Attentive      Response to Learning: Able to verbalize current knowledge/experience and Progressing to goal      Endings: None Reported    Modes of Intervention: Education and Support      Discipline Responsible: Recreational Therapist      Signature:  PAPA Brooks

## 2024-08-01 NOTE — PLAN OF CARE
Problem: Anxiety  Goal: Will report anxiety at manageable levels  Description: INTERVENTIONS:  1. Administer medication as ordered  2. Teach and rehearse alternative coping skills  3. Provide emotional support with 1:1 interaction with staff  7/31/2024 2154 by Rachel Rutherford RN  Outcome: Progressing  7/31/2024 0954 by Isela Forrester, RN  Outcome: Progressing     Problem: Coping  Goal: Pt/Family able to verbalize concerns and demonstrate effective coping strategies  Description: INTERVENTIONS:  1. Assist patient/family to identify coping skills, available support systems and cultural and spiritual values  2. Provide emotional support, including active listening and acknowledgement of concerns of patient and caregivers  3. Reduce environmental stimuli, as able  4. Instruct patient/family in relaxation techniques, as appropriate  5. Assess for spiritual pain/suffering and initiate Spiritual Care, Psychosocial Clinical Specialist consults as needed  7/31/2024 2154 by Rachel Rutherford RN  Outcome: Progressing  7/31/2024 0954 by Isela Forrester, RN  Outcome: Progressing

## 2024-08-01 NOTE — GROUP NOTE
Group Therapy Note    Date: 8/1/2024    Group Start Time: 1335  Group End Time: 1420  Group Topic: Recreational    [facility] 2 [facility] NON ACUTE    Neeru Grace        Group Therapy Note    Facilitated leisure skills group to reinforce positive coping and to manage mood through music, social interaction, group activities and art task       Attendees: 4/5       Notes:  Encouraged but did not attend    Discipline Responsible: Recreational Therapist      Signature:  PAPA Brooks

## 2024-08-02 PROCEDURE — 6370000000 HC RX 637 (ALT 250 FOR IP): Performed by: PSYCHIATRY & NEUROLOGY

## 2024-08-02 PROCEDURE — 1240000000 HC EMOTIONAL WELLNESS R&B

## 2024-08-02 RX ORDER — QUETIAPINE FUMARATE 25 MG/1
50 TABLET, FILM COATED ORAL NIGHTLY
Status: DISCONTINUED | OUTPATIENT
Start: 2024-08-02 | End: 2024-08-02

## 2024-08-02 RX ORDER — ZIPRASIDONE HYDROCHLORIDE 40 MG/1
40 CAPSULE ORAL 2 TIMES DAILY WITH MEALS
Status: DISCONTINUED | OUTPATIENT
Start: 2024-08-02 | End: 2024-08-03

## 2024-08-02 RX ADMIN — ZIPRASIDONE HYDROCHLORIDE 20 MG: 20 CAPSULE ORAL at 08:34

## 2024-08-02 RX ADMIN — BICTEGRAVIR SODIUM, EMTRICITABINE, AND TENOFOVIR ALAFENAMIDE FUMARATE 1 TABLET: 50; 200; 25 TABLET ORAL at 08:34

## 2024-08-02 RX ADMIN — ZIPRASIDONE HYDROCHLORIDE 40 MG: 40 CAPSULE ORAL at 17:06

## 2024-08-02 RX ADMIN — TRAZODONE HYDROCHLORIDE 50 MG: 50 TABLET ORAL at 20:26

## 2024-08-02 ASSESSMENT — PAIN SCALES - GENERAL
PAINLEVEL_OUTOF10: 0
PAINLEVEL_OUTOF10: 0

## 2024-08-02 NOTE — PLAN OF CARE
Problem: Anxiety  Goal: Will report anxiety at manageable levels  Description: INTERVENTIONS:  1. Administer medication as ordered  2. Teach and rehearse alternative coping skills  3. Provide emotional support with 1:1 interaction with staff  Outcome: Progressing     Problem: Coping  Goal: Pt/Family able to verbalize concerns and demonstrate effective coping strategies  Description: INTERVENTIONS:  1. Assist patient/family to identify coping skills, available support systems and cultural and spiritual values  2. Provide emotional support, including active listening and acknowledgement of concerns of patient and caregivers  3. Reduce environmental stimuli, as able  4. Instruct patient/family in relaxation techniques, as appropriate  5. Assess for spiritual pain/suffering and initiate Spiritual Care, Psychosocial Clinical Specialist consults as needed  8/2/2024 0909 by Ramya Hammond RN  Outcome: Progressing  8/1/2024 2117 by Jean Holbrook RN  Outcome: Progressing     Problem: Behavior  Goal: Pt/Family maintain appropriate behavior and adhere to behavioral management agreement, if implemented  Description: INTERVENTIONS:  1. Assess patient/family's coping skills and  non-compliant behavior (including use of illegal substances)  2. Notify security of behavior or suspected illegal substances which indicate the need for search of the family and/or belongings  3. Encourage verbalization of thoughts and concerns in a socially appropriate manner  4. Utilize positive, consistent limit setting strategies supporting safety of patient, staff and others  5. Encourage participation in the decision making process about the behavioral management agreement  6. If a visitor's behavior poses a threat to safety call refer to organization policy.  7. Initiate consult with , Psychosocial CNS, Spiritual Care as appropriate  8/2/2024 0909 by Ramya Hammond RN  Outcome: Progressing  8/1/2024 2117 by Jean Holbrook

## 2024-08-02 NOTE — PROGRESS NOTES
Progress Note  Date:2024       Room:[facility]  Patient Name:Melissa Ham     YOB: 2000     Age:24 y.o.        Subjective    Subjective     Pt appears to be more calm today. Pt has been able to sleep well. No SI/HI at this time. Pt requested for a contact case to remove her contacts. Pt expressed wanting to leave.   Review of Systems  Objective         Vitals Last 24 Hours:  TEMPERATURE:  Temp  Av.9 °F (37.2 °C)  Min: 98.8 °F (37.1 °C)  Max: 99 °F (37.2 °C)  RESPIRATIONS RANGE: Resp  Av.3  Min: 16  Max: 18  PULSE OXIMETRY RANGE: SpO2  Av %  Min: 100 %  Max: 100 %  PULSE RANGE: Pulse  Av.7  Min: 61  Max: 92  BLOOD PRESSURE RANGE: Systolic (24hrs), Av , Min:108 , Max:120   ; Diastolic (24hrs), Av, Min:66, Max:94    I/O (24Hr):  No intake or output data in the 24 hours ending 24 1328  Objective  Labs/Imaging/Diagnostics    Labs:  CBC:No results for input(s): \"WBC\", \"RBC\", \"HGB\", \"HCT\", \"MCV\", \"RDW\", \"PLT\" in the last 72 hours.  CHEMISTRIES:No results for input(s): \"NA\", \"K\", \"CL\", \"CO2\", \"BUN\", \"CREATININE\", \"GLUCOSE\", \"PHOS\", \"MG\" in the last 72 hours.    Invalid input(s): \"CA\"  PT/INR:No results for input(s): \"PROTIME\", \"INR\" in the last 72 hours.  APTT:No results for input(s): \"APTT\" in the last 72 hours.  LIVER PROFILE:No results for input(s): \"AST\", \"ALT\", \"BILIDIR\", \"BILITOT\", \"ALKPHOS\" in the last 72 hours.    Imaging Last 24 Hours:  No results found.  Assessment//Plan           Hospital Problems             Last Modified POA    * (Principal) Hx of bipolar disorder 2024 Yes    Bipolar 1 disorder (HCC) 2024 Yes     Assessment & Plan  Increase Geodon to 40 mg p.o. twice daily.  Continue to make slow progress.    Electronically signed by Antonieta Carcamo MD on 24 at 1:28 PM EDT

## 2024-08-02 NOTE — PROGRESS NOTES
Comprehensive Nutrition Assessment    Type and Reason for Visit:  Reassess    Nutrition Recommendations/Plan:   Continue current diet order, encourage PO intake  Add high protein/kcal supplement BID  RD to monitor po intake, weight, labs, GI function     Malnutrition Assessment:  Malnutrition Status:  At risk for malnutrition (Comment) (07/30/24 1230)    Context:  Chronic Illness     Findings of the 6 clinical characteristics of malnutrition:  Energy Intake:  No significant decrease in energy intake  Weight Loss:  No significant weight loss (Pt states weight \"normal\". Usually range 105-120#))     Body Fat Loss:        Muscle Mass Loss:  Unable to assess    Fluid Accumulation:  Unable to assess     Strength:  Not Performed    Nutrition Assessment:    Pt has low po intakes, 1-25% or 26-50% meals. Recommend adding high protein/kcal supplement. Preferences added by RD. Labs and meds reviewed.    Nutrition Related Findings:    No BM documented. PO intakes 1-50% meals. Wound Type: None       Current Nutrition Intake & Therapies:    Average Meal Intake: 26-50%  Average Supplements Intake: None Ordered  ADULT DIET; Regular; Likes: Burgers, fries, grilled cheese, chips, fruit, strawberries, pineapple    Anthropometric Measures:  Height: 170.2 cm (5' 7.01\")  Ideal Body Weight (IBW): 135 lbs (61 kg)       Current Body Weight: 49.4 kg (108 lb 14.5 oz), 80.7 % IBW. Weight Source: Stated  Current BMI (kg/m2): 17.1        Weight Adjustment For: No Adjustment     BMI Categories: Underweight (BMI less than 18.5)    Estimated Daily Nutrient Needs:  Energy Requirements Based On: Kcal/kg  Weight Used for Energy Requirements: Current  Energy (kcal/day): 1800-2000kcals/d (35-40kcals/kg for weight gain)  Weight Used for Protein Requirements: Current  Protein (g/day): 60gm/d (1.2 gm/kg)  Method Used for Fluid Requirements: 1 ml/kcal  Fluid (ml/day): 1800cc (1cc/kcal)    Nutrition Diagnosis:   Inadequate energy intake, Underweight (at

## 2024-08-02 NOTE — GROUP NOTE
Group Therapy Note    Date: 8/2/2024    Group Start Time: 1105  Group End Time: 1135  Group Topic: Process Group - Inpatient    SSR 2 [facility] Desirae Cortés        Group Therapy Note: This writer provided each individual with a worksheet on \"my strengths and qualities\" for them to work on and share their responses with their peers in group.     Attendees: 4       Patient's Goal:  to attend groups.     Notes:  Pt was in the shower and so did not attend.     Signature:  Desirae Flores

## 2024-08-02 NOTE — GROUP NOTE
Group Therapy Note    Date: 8/2/2024    Group Start Time: 1350  Group End Time: 1420  Group Topic: Recreational    SSR 2 BH NON ACUTE    Neeru Grace        Group Therapy Note    Facilitated leisure skills group to reinforce positive coping and to manage mood through music, social interaction, group activities and art task       Attendees: 1/5         Notes:  Encouraged but did not attend    Discipline Responsible: Recreational Therapist      Signature:  PAPA Brooks

## 2024-08-02 NOTE — PLAN OF CARE
Problem: Anxiety  Goal: Will report anxiety at manageable levels  Description: INTERVENTIONS:  1. Administer medication as ordered  2. Teach and rehearse alternative coping skills  3. Provide emotional support with 1:1 interaction with staff  8/2/2024 1936 by Jean Holbrook RN  Outcome: Progressing  8/2/2024 0909 by Ramya Hammond RN  Outcome: Progressing     Problem: Coping  Goal: Pt/Family able to verbalize concerns and demonstrate effective coping strategies  Description: INTERVENTIONS:  1. Assist patient/family to identify coping skills, available support systems and cultural and spiritual values  2. Provide emotional support, including active listening and acknowledgement of concerns of patient and caregivers  3. Reduce environmental stimuli, as able  4. Instruct patient/family in relaxation techniques, as appropriate  5. Assess for spiritual pain/suffering and initiate Spiritual Care, Psychosocial Clinical Specialist consults as needed  8/2/2024 1936 by Jean Holbrook RN  Outcome: Progressing  8/2/2024 0909 by Ramya Hammond RN  Outcome: Progressing

## 2024-08-02 NOTE — PLAN OF CARE
Problem: Coping  Goal: Pt/Family able to verbalize concerns and demonstrate effective coping strategies  Description: INTERVENTIONS:  1. Assist patient/family to identify coping skills, available support systems and cultural and spiritual values  2. Provide emotional support, including active listening and acknowledgement of concerns of patient and caregivers  3. Reduce environmental stimuli, as able  4. Instruct patient/family in relaxation techniques, as appropriate  5. Assess for spiritual pain/suffering and initiate Spiritual Care, Psychosocial Clinical Specialist consults as needed  8/1/2024 2117 by Jean Holbrook RN  Outcome: Progressing  8/1/2024 1137 by Isela Forrester, RN  Outcome: Progressing     Problem: Behavior  Goal: Pt/Family maintain appropriate behavior and adhere to behavioral management agreement, if implemented  Description: INTERVENTIONS:  1. Assess patient/family's coping skills and  non-compliant behavior (including use of illegal substances)  2. Notify security of behavior or suspected illegal substances which indicate the need for search of the family and/or belongings  3. Encourage verbalization of thoughts and concerns in a socially appropriate manner  4. Utilize positive, consistent limit setting strategies supporting safety of patient, staff and others  5. Encourage participation in the decision making process about the behavioral management agreement  6. If a visitor's behavior poses a threat to safety call refer to organization policy.  7. Initiate consult with , Psychosocial CNS, Spiritual Care as appropriate  8/1/2024 2117 by Jean Holbrook RN  Outcome: Progressing  8/1/2024 1137 by Isela Forrester RN  Outcome: Progressing

## 2024-08-02 NOTE — GROUP NOTE
Group Therapy Note    Date: 8/2/2024    Group Start Time: 0945  Group End Time: 1030  Group Topic: Education Group - Inpatient    [facility] 2  NON ACUTE    Neeru Grace        Group Therapy Note    Facilitated discussion focused on defining and sharing examples of different types of automatic negative thoughts and how they affect moods and behaviors      Attendees: 3/6       Patient's Goal:  Client will learn and demonstrate improved boundaries    Notes:   Receptive to information discussed and engaged. Was able to recognize and acknowledged engaging in some of them in the past and prior to admission and shared a personal example       Status After Intervention:  Improved    Participation Level: Active Listener and Interactive    Participation Quality: Appropriate, Attentive, and Sharing      Speech:  normal      Thought Process/Content: Logical      Affective Functioning: Congruent      Mood:  Calm      Level of consciousness:  Attentive      Response to Learning: Able to verbalize current knowledge/experience and Progressing to goal      Endings: None Reported    Modes of Intervention: Education and Support      Discipline Responsible: Recreational Therapist      Signature:  PAPA Brooks

## 2024-08-03 PROCEDURE — 6370000000 HC RX 637 (ALT 250 FOR IP): Performed by: PSYCHIATRY & NEUROLOGY

## 2024-08-03 PROCEDURE — 1240000000 HC EMOTIONAL WELLNESS R&B

## 2024-08-03 RX ORDER — ZIPRASIDONE HYDROCHLORIDE 20 MG/1
20 CAPSULE ORAL 2 TIMES DAILY WITH MEALS
Status: DISCONTINUED | OUTPATIENT
Start: 2024-08-03 | End: 2024-08-04

## 2024-08-03 RX ORDER — QUETIAPINE FUMARATE 25 MG/1
50 TABLET, FILM COATED ORAL NIGHTLY
Status: DISCONTINUED | OUTPATIENT
Start: 2024-08-03 | End: 2024-08-06

## 2024-08-03 RX ADMIN — QUETIAPINE FUMARATE 50 MG: 25 TABLET ORAL at 20:47

## 2024-08-03 RX ADMIN — BICTEGRAVIR SODIUM, EMTRICITABINE, AND TENOFOVIR ALAFENAMIDE FUMARATE 1 TABLET: 50; 200; 25 TABLET ORAL at 08:07

## 2024-08-03 NOTE — GROUP NOTE
Group Therapy Note    Date: 8/3/2024    Group Start Time: 1730  Group End Time: 1815  Group Topic: Recreational    SSR 2 BH NON ACUTE    Jaqui Valdez        Group Therapy Note    Attendees: 2/4     Recreational Therapist facilitated structured leisure skills group to introduce healthy leisure skills as positive way to cope and manage mood.        Patient's Goal:    Client will learn and demonstrate improved bourndaries     Notes:  Attended group and listened to songs with peers.  Pt was receptive to intervention and responded to prompts from staff. Attended entire session and was attentive during group.       Status After Intervention:  Improved    Participation Level: Active Listener    Participation Quality: Appropriate      Speech:  normal      Thought Process/Content: Logical      Affective Functioning: Congruent      Mood:  calm      Level of consciousness:  Alert      Response to Learning: Progressing to goal      Endings: None Reported    Modes of Intervention: Activity      Discipline Responsible: Recreational Therapist      Signature:  PAPA Bell

## 2024-08-03 NOTE — PLAN OF CARE
Problem: Anxiety  Goal: Will report anxiety at manageable levels  Description: INTERVENTIONS:  1. Administer medication as ordered  2. Teach and rehearse alternative coping skills  3. Provide emotional support with 1:1 interaction with staff  Outcome: Progressing     Problem: Coping  Goal: Pt/Family able to verbalize concerns and demonstrate effective coping strategies  Description: INTERVENTIONS:  1. Assist patient/family to identify coping skills, available support systems and cultural and spiritual values  2. Provide emotional support, including active listening and acknowledgement of concerns of patient and caregivers  3. Reduce environmental stimuli, as able  4. Instruct patient/family in relaxation techniques, as appropriate  5. Assess for spiritual pain/suffering and initiate Spiritual Care, Psychosocial Clinical Specialist consults as needed  8/3/2024 1945 by Jean Holbrook, RN  Outcome: Progressing  8/3/2024 0821 by Marietta Hogan, RN  Outcome: Progressing

## 2024-08-03 NOTE — GROUP NOTE
Group Therapy Note    Date: 8/3/2024    Group Start Time: 0945  Group End Time: 1025  Group Topic: Education Group - Inpatient    SSR 2 BH NON ACUTE    Jaqui Valdez        Group Therapy Note    Attendees: 2/4    Facilitated structured group to increase awareness of triggers, encourage pt. to explore places, people and situations that cause strong emotional responses and encourage pt to identify positive ways to manage triggers.        Patient's Goal:  Client will learn and demonstrate improved bourndaries     Notes:  Attended group discussion. Related she was feeling \"neutral but a little overwhelmed... just feeling a little pressured\". PT related she has been triggered by unknown things in her environment, of fear of potential threats that could occur. Related meditation and relaxation as positive coping.     Status After Intervention:  Improved    Participation Level: Active Listener    Participation Quality: Appropriate      Speech:  normal      Thought Process/Content: Logical      Affective Functioning: Congruent      Mood:  calm      Level of consciousness:  Alert      Response to Learning: Progressing to goal      Endings: None Reported    Modes of Intervention: Education and Support      Discipline Responsible: Recreational Therapist      Signature:  PAPA Bell

## 2024-08-03 NOTE — PROGRESS NOTES
Progress Note  Date:8/3/2024       Room:[facility]  Patient Name:Melissa Ham     YOB: 2000     Age:24 y.o.        Subjective    Subjective Ms. Ham continued to make slow progress she was apologetic this morning smiling more.  She did state that her medications make her feel somewhat her chest tight.  Currently feels fine.  Denies having any self-harm thoughts suicidal feelings.  Decrease in her dysphoria noted.  Review of Systems  Objective         Vitals Last 24 Hours:  TEMPERATURE:  Temp  Av.2 °F (36.8 °C)  Min: 98.2 °F (36.8 °C)  Max: 98.2 °F (36.8 °C)  RESPIRATIONS RANGE: Resp  Av.5  Min: 16  Max: 17  PULSE OXIMETRY RANGE: SpO2  Av %  Min: 99 %  Max: 99 %  PULSE RANGE: Pulse  Av.5  Min: 76  Max: 83  BLOOD PRESSURE RANGE: Systolic (24hrs), Av , Min:116 , Max:130   ; Diastolic (24hrs), Av, Min:75, Max:87    I/O (24Hr):  No intake or output data in the 24 hours ending 24 0942  Objective  Labs/Imaging/Diagnostics    Labs:  CBC:No results for input(s): \"WBC\", \"RBC\", \"HGB\", \"HCT\", \"MCV\", \"RDW\", \"PLT\" in the last 72 hours.  CHEMISTRIES:No results for input(s): \"NA\", \"K\", \"CL\", \"CO2\", \"BUN\", \"CREATININE\", \"GLUCOSE\", \"PHOS\", \"MG\" in the last 72 hours.    Invalid input(s): \"CA\"  PT/INR:No results for input(s): \"PROTIME\", \"INR\" in the last 72 hours.  APTT:No results for input(s): \"APTT\" in the last 72 hours.  LIVER PROFILE:No results for input(s): \"AST\", \"ALT\", \"BILIDIR\", \"BILITOT\", \"ALKPHOS\" in the last 72 hours.    Imaging Last 24 Hours:  No results found.  Assessment//Plan           Hospital Problems             Last Modified POA    * (Principal) Hx of bipolar disorder 2024 Yes    Bipolar 1 disorder (HCC) 2024 Yes     Assessment & Plan  Decrease Geodon to 20 mg p.o. twice daily  Discontinue trazodone  Start Seroquel 50 mg at bedtime which she had taken in the past and has tolerated it well  Electronically signed by Antonieta Carcamo MD on 8/3/24 at 9:42 AM EDT

## 2024-08-03 NOTE — GROUP NOTE
Group Therapy Note    Date: 8/3/2024    Group Start Time: 1315  Group End Time: 1400  Group Topic: Relaxation    SSR 2 BH NON ACUTE    Jaqui Valdez        Group Therapy Note    Attendees: 2/4    Facilitated structured group to identify positive ways to cope and manage daily stressors. Introduced relaxation (Guided Relaxation) technique to introduce healthy way to cope and manage stressors in group.        Patient's Goal:  Client will learn and demonstrate improved bourndaries     Notes:  Attended group and actively participated. Pt was receptive to intervention and verbalized the intervention is a positive way to relax.  Identified music and nature sounds is another positive way to relax.        Status After Intervention:  Improved    Participation Level: Active Listener    Participation Quality: Appropriate      Speech:  normal      Thought Process/Content: Logical      Affective Functioning: Congruent      Mood:  calm       Level of consciousness:  Alert      Response to Learning: Progressing to goal      Endings: None Reported    Modes of Intervention: Activity      Discipline Responsible: Recreational Therapist      Signature:  PAPA Bell

## 2024-08-04 PROCEDURE — 6370000000 HC RX 637 (ALT 250 FOR IP): Performed by: PSYCHIATRY & NEUROLOGY

## 2024-08-04 PROCEDURE — 1240000000 HC EMOTIONAL WELLNESS R&B

## 2024-08-04 RX ORDER — PALIPERIDONE 3 MG/1
3 TABLET, EXTENDED RELEASE ORAL ONCE
Status: COMPLETED | OUTPATIENT
Start: 2024-08-04 | End: 2024-08-04

## 2024-08-04 RX ADMIN — PALIPERIDONE 3 MG: 3 TABLET, EXTENDED RELEASE ORAL at 12:19

## 2024-08-04 RX ADMIN — QUETIAPINE FUMARATE 50 MG: 25 TABLET ORAL at 21:04

## 2024-08-04 RX ADMIN — BICTEGRAVIR SODIUM, EMTRICITABINE, AND TENOFOVIR ALAFENAMIDE FUMARATE 1 TABLET: 50; 200; 25 TABLET ORAL at 08:09

## 2024-08-04 NOTE — GROUP NOTE
Group Therapy Note    Date: 8/4/2024    Group Start Time: 0930  Group End Time: 1010  Group Topic: Education Group - Inpatient    SSR 2  NON ACUTE    Jaqui Valdez        Group Therapy Note    Attendees: 3/4    Facilitated structured group discussion to identify protective factors that have been valuable and protective factors that could be improved in managing stressors.         Patient's Goal:  Client will learn and demonstrate improved bourndaries     Notes:  Attended group discussion related to Protective Factors. Pt reviewed Protective Factors and was able to rate the level of strength in those factors in relation to managing stress.  Pt was cooperative and receptive to intervention. Pt stated \"I don't know how I feel today\". PT related she felt tired due to medication. Related positive self esteem and needs to work on healthy thinking.     Status After Intervention:  Improved    Participation Level: Active Listener    Participation Quality: Appropriate      Speech:  normal      Thought Process/Content: Logical      Affective Functioning: Congruent      Mood:  calm      Level of consciousness:  Alert      Response to Learning: Progressing to goal      Endings: None Reported    Modes of Intervention: Education and Support      Discipline Responsible: Recreational Therapist      Signature:  PAPA Bell

## 2024-08-04 NOTE — PROGRESS NOTES
Progress Note  Date:2024       Room:[facility]  Patient Name:Melissa Ham     YOB: 2000     Age:24 y.o.        Subjective    Subjective  Patient states she does not like her Geodon having to take twice a day.  She states she is feeling all right but would like to consider alternate medications.  Patient agreeable and considering long acting injection.  Review of Systems  Objective         Vitals Last 24 Hours:  TEMPERATURE:  Temp  Av.8 °F (37.1 °C)  Min: 97.9 °F (36.6 °C)  Max: 99.3 °F (37.4 °C)  RESPIRATIONS RANGE: Resp  Av.3  Min: 16  Max: 17  PULSE OXIMETRY RANGE: No data recorded  PULSE RANGE: Pulse  Av  Min: 78  Max: 84  BLOOD PRESSURE RANGE: Systolic (24hrs), Av , Min:106 , Max:112   ; Diastolic (24hrs), Av, Min:70, Max:86    I/O (24Hr):  No intake or output data in the 24 hours ending 24 0930  Objective  Labs/Imaging/Diagnostics    Labs:  CBC:No results for input(s): \"WBC\", \"RBC\", \"HGB\", \"HCT\", \"MCV\", \"RDW\", \"PLT\" in the last 72 hours.  CHEMISTRIES:No results for input(s): \"NA\", \"K\", \"CL\", \"CO2\", \"BUN\", \"CREATININE\", \"GLUCOSE\", \"PHOS\", \"MG\" in the last 72 hours.    Invalid input(s): \"CA\"  PT/INR:No results for input(s): \"PROTIME\", \"INR\" in the last 72 hours.  APTT:No results for input(s): \"APTT\" in the last 72 hours.  LIVER PROFILE:No results for input(s): \"AST\", \"ALT\", \"BILIDIR\", \"BILITOT\", \"ALKPHOS\" in the last 72 hours.    Imaging Last 24 Hours:  No results found.  Assessment//Plan           Hospital Problems             Last Modified POA    * (Principal) Hx of bipolar disorder 2024 Yes    Bipolar 1 disorder (HCC) 2024 Yes     Assessment & Plan  Discontinue Geodon as patient does not like how the medication makes her feel.  Start Invega 3 mg 1 time dose  Will explore long-acting Invega Sustenna if patient tolerates Invega.  Continue group therapy  Discharge planning and 1 to 2 days.      Current Facility-Administered Medications:     paliperidone

## 2024-08-04 NOTE — GROUP NOTE
Group Therapy Note    Date: 8/4/2024    Group Start Time: 1315  Group End Time: 1400  Group Topic: Recreational    SSR 2 BH NON ACUTE    Jaqui Valdez        Group Therapy Note    Attendees: 3/4    Recreational Therapist facilitated structured leisure skills group to introduce healthy leisure skills as positive way to cope and manage mood.         Patient's Goal:   Client will learn and demonstrate improved bourndaries     Notes: Attended group and listened to songs with peers.  Pt was receptive to intervention and responded to prompts from staff. Attended entire session and was attentive during group.      Status After Intervention:  Improved    Participation Level: Active Listener    Participation Quality: Appropriate      Speech:  normal      Thought Process/Content: Logical      Affective Functioning: Congruent      Mood:  calm       Level of consciousness:  Alert      Response to Learning: Progressing to goal      Endings: None Reported    Modes of Intervention: Activity      Discipline Responsible: Recreational Therapist      Signature:  PAPA Bell

## 2024-08-05 PROCEDURE — 6360000002 HC RX W HCPCS

## 2024-08-05 PROCEDURE — 6360000002 HC RX W HCPCS: Performed by: PSYCHIATRY & NEUROLOGY

## 2024-08-05 PROCEDURE — 6370000000 HC RX 637 (ALT 250 FOR IP): Performed by: PSYCHIATRY & NEUROLOGY

## 2024-08-05 PROCEDURE — 1240000000 HC EMOTIONAL WELLNESS R&B

## 2024-08-05 RX ADMIN — QUETIAPINE FUMARATE 50 MG: 25 TABLET ORAL at 21:12

## 2024-08-05 RX ADMIN — PALIPERIDONE PALMITATE 156 MG: 156 INJECTION INTRAMUSCULAR at 13:04

## 2024-08-05 RX ADMIN — BICTEGRAVIR SODIUM, EMTRICITABINE, AND TENOFOVIR ALAFENAMIDE FUMARATE 1 TABLET: 50; 200; 25 TABLET ORAL at 08:22

## 2024-08-05 NOTE — PROGRESS NOTES
Pt Melissa Ham actively participated in Spirituality Group today about Compassion on [facility].    Please contact Spiritual Health with any emotional/spiritual needs or referrals. Thank you.    Delfino Sandoval, Chaplain Resident, [facility]

## 2024-08-05 NOTE — GROUP NOTE
Group Therapy Note    Date: 8/5/2024    Group Start Time: 1335  Group End Time: 1420  Group Topic: Recreational    SSR 2  NON ACUTE    Neeru Grace        Group Therapy Note    Facilitated leisure skills group to reinforce positive coping and to manage mood through music, social interaction, group activities and art task       Attendees: 2/4       Patient's Goal: Client will learn and demonstrate improved boundaries    Notes:  Pt was receptive to listening to music and songs she selected. Interacted with peer and staff. Declined to work on leisure task     Status After Intervention:  Improved    Participation Level: Active Listener and Interactive    Participation Quality: Appropriate      Speech:  normal      Thought Process/Content: Logical      Affective Functioning: Congruent      Mood:  Calm      Level of consciousness:  Alert      Response to Learning: Progressing to goal      Endings: None Reported    Modes of Intervention: Socialization and Activity      Discipline Responsible: Recreational Therapist      Signature:  PAPA Brooks

## 2024-08-05 NOTE — GROUP NOTE
Group Therapy Note    Date: 8/5/2024    Group Start Time: 0945  Group End Time: 1020  Group Topic: Education Group - Inpatient    SSR 2  NON ACUTE    Neeru Grace        Group Therapy Note    Setting Goals/Facilitated discussion focused on “stepping up to a better you” by taking steps to improve in their well-being and identifying goals that would help to ensure follow-up       Attendees: 2/6       Patient's Goal:  Client will learn and demonstrate improved boundaries    Notes:  Receptive to information discussed and engaged. Pt shared prior to admission on a scale of 0/poor-10/good she was taking care of herself at a “10\" Pt shared \"having a dream team and educating herself more \"influences how well she take care of herself. Pt shared her goal is to \"avoid alcohol/drugs, get the proper amount of sleep, keep learning and educating herself more and listen to her dreams and inspire others\"     Status After Intervention:  Improved     Participation Level: Active Listener and Interactive    Participation Quality: Appropriate, Attentive, and Sharing      Speech:  normal      Thought Process/Content: Logical      Affective Functioning: Congruent      Mood:  Calm      Level of consciousness:  Attentive      Response to Learning: Able to verbalize current knowledge/experience and Progressing to goal      Endings: None Reported    Modes of Intervention: Education and Support      Discipline Responsible: Recreational Therapist      Signature:  PAPA Brooks

## 2024-08-05 NOTE — PROGRESS NOTES
Progress Note  Date:2024       Room:[facility]  Patient Name:Melissa Ham     YOB: 2000     Age:24 y.o.        Subjective    Subjective   Pt appeared calmer than before and stated that she would try Invega. She stated that she talked to her grandmother but still mentioned \"spiritual warfare.\" Pt claims to be sleeping and eating. No SI/HI at this time.     Review of Systems  Objective         Vitals Last 24 Hours:  TEMPERATURE:  Temp  Av.2 °F (37.3 °C)  Min: 98.6 °F (37 °C)  Max: 99.5 °F (37.5 °C)  RESPIRATIONS RANGE: Resp  Av.3  Min: 16  Max: 18  PULSE OXIMETRY RANGE: No data recorded  PULSE RANGE: Pulse  Av.3  Min: 84  Max: 92  BLOOD PRESSURE RANGE: Systolic (24hrs), Av , Min:106 , Max:137   ; Diastolic (24hrs), Av, Min:73, Max:89    I/O (24Hr):  No intake or output data in the 24 hours ending 24 1440  Objective  Labs/Imaging/Diagnostics    Labs:  CBC:No results for input(s): \"WBC\", \"RBC\", \"HGB\", \"HCT\", \"MCV\", \"RDW\", \"PLT\" in the last 72 hours.  CHEMISTRIES:No results for input(s): \"NA\", \"K\", \"CL\", \"CO2\", \"BUN\", \"CREATININE\", \"GLUCOSE\", \"PHOS\", \"MG\" in the last 72 hours.    Invalid input(s): \"CA\"  PT/INR:No results for input(s): \"PROTIME\", \"INR\" in the last 72 hours.  APTT:No results for input(s): \"APTT\" in the last 72 hours.  LIVER PROFILE:No results for input(s): \"AST\", \"ALT\", \"BILIDIR\", \"BILITOT\", \"ALKPHOS\" in the last 72 hours.    Imaging Last 24 Hours:  No results found.  Assessment//Plan           Hospital Problems             Last Modified POA    * (Principal) Hx of bipolar disorder 2024 Yes    Bipolar 1 disorder (HCC) 2024 Yes     Assessment & Plan  Tolerated invega sustenna 154 mg Im today    Current Facility-Administered Medications:     QUEtiapine (SEROQUEL) tablet 50 mg, 50 mg, Oral, Nightly, Antonieta Carcamo MD, 50 mg at 24    LORazepam (ATIVAN) tablet 0.5 mg, 0.5 mg, Oral, Q4H PRN, Kiya Montemayor MD    acetaminophen (TYLENOL) tablet 650 mg,

## 2024-08-06 VITALS
BODY MASS INDEX: 17.11 KG/M2 | DIASTOLIC BLOOD PRESSURE: 63 MMHG | SYSTOLIC BLOOD PRESSURE: 108 MMHG | WEIGHT: 109 LBS | RESPIRATION RATE: 20 BRPM | TEMPERATURE: 97.9 F | OXYGEN SATURATION: 100 % | HEIGHT: 67 IN | HEART RATE: 68 BPM

## 2024-08-06 PROCEDURE — 6370000000 HC RX 637 (ALT 250 FOR IP): Performed by: PSYCHIATRY & NEUROLOGY

## 2024-08-06 RX ORDER — QUETIAPINE FUMARATE 100 MG/1
100 TABLET, FILM COATED ORAL NIGHTLY
Status: DISCONTINUED | OUTPATIENT
Start: 2024-08-06 | End: 2024-08-06 | Stop reason: HOSPADM

## 2024-08-06 RX ORDER — QUETIAPINE FUMARATE 100 MG/1
100 TABLET, FILM COATED ORAL NIGHTLY
Qty: 60 TABLET | Refills: 1 | Status: SHIPPED | OUTPATIENT
Start: 2024-08-06

## 2024-08-06 RX ORDER — BICTEGRAVIR SODIUM, EMTRICITABINE, AND TENOFOVIR ALAFENAMIDE FUMARATE 50; 200; 25 MG/1; MG/1; MG/1
1 TABLET ORAL DAILY
Qty: 30 TABLET | Refills: 1 | Status: SHIPPED | OUTPATIENT
Start: 2024-08-06

## 2024-08-06 RX ADMIN — BICTEGRAVIR SODIUM, EMTRICITABINE, AND TENOFOVIR ALAFENAMIDE FUMARATE 1 TABLET: 50; 200; 25 TABLET ORAL at 08:37

## 2024-08-06 NOTE — BH NOTE
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  Patient has been up on the unit.  She has a slightly low-energy, and droopy-eyed presentation.  She said the medication causes her to feel tired.  She said she slept about four hours today.  She said she is presently \"alright.\"   She denied SI or HI.  Patient was given normal saline in two small medication cups for her contacts after a call from Dr. Carlos Alberto MD about this issue.  Continue to assess.    Patient received PRN Trazodone.  
Patient was seen on the unit in the day room watching tv before bed time. She began conversing with this writer and continues to have delusion thinking. She shows paranoia, and believes that the codey she was having sex with could seen into her soul with his soul and she could see it come out of him out of his eyes. She also says that they had sex at his mother's house and now she has put a curse on her. She said she was told she was HIV positive and every time they did the test a few years ago it should false positives multiple times then just positive so she doesn't know if that is right. She had flight of ideas, hyperverbal, and loose associations talking about that she knows things that other people don't know, and that we all have superpowers and she discovered this from someone and told them about everyone having them. Also she rambled about the illuminati and that she knows they are real. Patient was med compliant but asked if she could only take 1 of the Seroquel that was ordered instead of 2. Nurse explained reason for dose and patient took medication. She rated her depression 5/10, anxiety 0/10, and denied SI, HI and hallucinations. She discussed being told by a doctor and her mother that she had schizophrenia but she does not believe she as it. She is just open an aware of things in this world that others are now. Patient has now been sleeping well so far tonight, with  no signs of distress noted. Will continue to monitor patient every 15 mins as per unit protocol.   
Per NP Bety ADKINS. Pt doesn't require 1:1 on BHU.  
Pt up ad mitchel on unit, got up for breakfast consumed approx 75% sitting in the dayroom. Pt affect is flat, but pt is cooperative and friendly with staff and peers. Pt accepted scheduled medications and stated she does not like how the medication makes her feel and states she only wants her Biktarvy. Writer educated pt on scheduled geodon, and pt agreed to take the scheduled geodon along with the Biktarvy. Pt states she would like to be discharged. Pt states she does not understand why her family and her friends don't understand her. Pt denied depression, anxiety, SI/HI/AVH. Pt denied any pain or physical complaints. Q 15 min checks continued to ensure pt safety on unit.   
TDO HEARING DISPOSITION    Hearing date 7/29/2024  Committed up to 10 days  Expires 8/7/2024  
Writer ordered pt a [employer] taxi due to pt not having  insurance. Pts address is [address] [place] [address]     [id number]  
Writer spoke with pt about her discharge plan. Pt reported that the medications are working and she reported \"thinking clear\" and feeling \"better\". Pt shared that the first medications made her feel \"angry and heavy\" but the injection \"calms my thinking\". Pt shared that if she spoke with her grandmother and she sounded \"frustrated\" but ok with the pt. She shared that if she does not feel comfortable with staying with grandma she has friends that she can stay with. Writer informed the pt that her insurance is still pending and that she has the option of [facility] and [facility] for services. Pt thanked the writer and shared that if she starts to feel \"overwhelmed again\" she will come and admit herself. She shared that she knew she needed help but was afraid about being admitted \"against my will\". Pt was able to process that coming in herself would be a better option but it would not be her families choice   
education for smoking cessation added to the discharge instructions? Not applicable    Alcohol/Substance Abuse Discharge Plan:   Does the patient have a history of substance/alcohol abuse and requires a referral for treatment? Yes  Patient referred to the following for substance/alcohol abuse treatment with an appointment? Refused  Patient was offered medication to assist with alcohol cessation at discharge? Not applicable  Was education for substance/alcohol abuse added to discharge instructions? Yes    Patient discharged to Home/Self Care. Discharge information provided to the patient/caregiver either in hard copy or electronically.

## 2024-08-06 NOTE — DISCHARGE SUMMARY
Cocaine, Urine 07/27/2024 Negative  Negative   Final    Methadone, Urine 07/27/2024 Negative  Negative   Final    Opiates, Urine 07/27/2024 Negative  Negative   Final    Phencyclidine, Urine 07/27/2024 Negative  Negative   Final    THC, TH-Cannabinol, Urine 07/27/2024 Positive (A)  Negative   Final    Comments: 07/27/2024     Final                    Value:This test is a screen for drugs of abuse in a medical setting only (i.e., they are unconfirmed results and as such must not be used for non-medical purposes, e.g.,employment testing, legal testing). Due to its inherent nature, false positive (FP) and false negative (FN) results may be obtained. Therefore, if necessary for medical care, recommend confirmation of positive findings by GC/MS.      Ethanol Lvl 07/27/2024 <10  <10 mg/dL Final    Color, UA 07/27/2024 Yellow/Straw    Final    Appearance 07/27/2024 Clear  Clear   Final    Specific Gravity, UA 07/27/2024 1.006  1.003 - 1.030   Final    pH, Urine 07/27/2024 7.0  5.0 - 8.0   Final    Protein, UA 07/27/2024 Negative  Negative mg/dL Final    Glucose, Ur 07/27/2024 Negative  Negative mg/dL Final    Ketones, Urine 07/27/2024 Negative  Negative mg/dL Final    Bilirubin, Urine 07/27/2024 Negative  Negative   Final    Blood, Urine 07/27/2024 Negative  Negative   Final    Urobilinogen, Urine 07/27/2024 0.1  0.1 - 1.0 EU/dL Final    Nitrite, Urine 07/27/2024 Negative  Negative   Final    Leukocyte Esterase, Urine 07/27/2024 Negative  Negative   Final    WBC, UA 07/27/2024 0-4  0 - 4 /hpf Final    RBC, UA 07/27/2024 0-5  0 - 5 /hpf Final    Epithelial Cells, UA 07/27/2024 Many (A)  Few /lpf Final    BACTERIA, URINE 07/27/2024 Negative  Negative /hpf Final    Urine Culture if Indicated 07/27/2024 Culture not indicated by UA result  Culture not indicated by UA result   Final    Sodium 07/27/2024 137  136 - 145 mmol/L Final    Potassium 07/27/2024 3.8  3.5 - 5.1 mmol/L Final    Chloride 07/27/2024 108  97 - 108 mmol/L

## 2024-08-06 NOTE — PLAN OF CARE
Problem: Behavior  Goal: Pt/Family maintain appropriate behavior and adhere to behavioral management agreement, if implemented  Description: INTERVENTIONS:  1. Assess patient/family's coping skills and  non-compliant behavior (including use of illegal substances)  2. Notify security of behavior or suspected illegal substances which indicate the need for search of the family and/or belongings  3. Encourage verbalization of thoughts and concerns in a socially appropriate manner  4. Utilize positive, consistent limit setting strategies supporting safety of patient, staff and others  5. Encourage participation in the decision making process about the behavioral management agreement  6. If a visitor's behavior poses a threat to safety call refer to organization policy.  7. Initiate consult with , Psychosocial CNS, Spiritual Care as appropriate  Outcome: Progressing     Problem: Pain  Goal: Verbalizes/displays adequate comfort level or baseline comfort level  Outcome: Progressing

## 2024-09-24 ENCOUNTER — TELEPHONE (OUTPATIENT)
Facility: CLINIC | Age: 24
End: 2024-09-24

## 2024-12-29 NOTE — GROUP NOTE
Group Therapy Note    Date: 7/30/2024    Group Start Time: 0915  Group End Time: 0930  Group Topic: Topic Group    [facility]    Cori Beck        Group Therapy Note    Attendees: 6       Patient's Goal:  personal space    Notes:  individual express her feelings regarding people she stay with not giving her personal space and boundaries     Status After Intervention:  Improved    Participation Level: Active Listener    Participation Quality: Appropriate        Discipline Responsible: Behavorial Health Tech      Signature:  Cori Beck     agree as above

## 2025-01-16 ENCOUNTER — TELEPHONE (OUTPATIENT)
Age: 25
End: 2025-01-16

## 2025-01-16 NOTE — TELEPHONE ENCOUNTER
Called and left VM to call us back so we can clarify that she needs a physical in order for us to give her the vaccinations she is requesting. Appt set for Monday 1/20 at 2:20 PM. 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 Dr. Adair was her PCP.

## 2025-01-27 ENCOUNTER — OFFICE VISIT (OUTPATIENT)
Age: 25
End: 2025-01-27
Payer: MEDICAID

## 2025-01-27 VITALS
DIASTOLIC BLOOD PRESSURE: 64 MMHG | BODY MASS INDEX: 19.21 KG/M2 | HEIGHT: 67 IN | WEIGHT: 122.38 LBS | SYSTOLIC BLOOD PRESSURE: 102 MMHG

## 2025-01-27 DIAGNOSIS — Z21 HIV INFECTION, UNSPECIFIED SYMPTOM STATUS (HCC): ICD-10-CM

## 2025-01-27 DIAGNOSIS — N89.8 VAGINAL DISCHARGE: Primary | ICD-10-CM

## 2025-01-27 PROCEDURE — 99214 OFFICE O/P EST MOD 30 MIN: CPT | Performed by: OBSTETRICS & GYNECOLOGY

## 2025-01-27 RX ORDER — AZITHROMYCIN 500 MG/1
500 TABLET, FILM COATED ORAL DAILY
Qty: 7 TABLET | Refills: 0 | Status: SHIPPED | OUTPATIENT
Start: 2025-01-27 | End: 2025-02-03

## 2025-01-27 RX ORDER — FLUCONAZOLE 150 MG/1
150 TABLET ORAL ONCE
Qty: 1 TABLET | Refills: 0 | Status: SHIPPED | OUTPATIENT
Start: 2025-01-27 | End: 2025-01-27

## 2025-01-27 RX ORDER — CLINDAMYCIN HYDROCHLORIDE 300 MG/1
300 CAPSULE ORAL 2 TIMES DAILY
Qty: 14 CAPSULE | Refills: 0 | Status: SHIPPED | OUTPATIENT
Start: 2025-01-27 | End: 2025-02-03

## 2025-01-27 ASSESSMENT — ENCOUNTER SYMPTOMS
RESPIRATORY NEGATIVE: 1
GASTROINTESTINAL NEGATIVE: 1

## 2025-01-27 NOTE — PROGRESS NOTES
Melissa Ham is a , 24 y.o. female   Patient's last menstrual period was 2025 (approximate).    She presents for her problem    She is having  vaginal d/c with an odor, tried OTC products without relief .      Menstrual status:  Cycles are usually regular with a 26-32 day interval with 3-7 day duration.    Flow: moderate.      She does not have dysmenorrhea.      Medical conditions:  Since her last annual GYN exam about  ? years ago, she has not the following changes in her health history: none.     Mammogram History:    MARAL Results (most recent):  @[employer](OKC0107:1)@     DEXA Results (most recent):  @[employer](ZRK3141:1)@       Past Medical History:   Diagnosis Date    HIV (human immunodeficiency virus infection) (HCC)      Past Surgical History:   Procedure Laterality Date    EYE MUSCLE SURGERY Left 2024    LEFT EYE MUSCLE REPAIR performed by Navid Tyson MD at [facility] AMBULATORY OR    EYE SURGERY      UPPER GASTROINTESTINAL ENDOSCOPY N/A 2023    EGD BIOPSY performed by Magali Moss MD at [facility] ENDOSCOPY       Prior to Admission medications    Medication Sig Start Date End Date Taking? Authorizing Provider   clindamycin (CLEOCIN) 300 MG capsule Take 1 capsule by mouth 2 times daily for 7 days 1/27/25 2/3/25 Yes Aaron Fields MD   azithromycin (ZITHROMAX) 500 MG tablet Take 1 tablet by mouth daily for 7 days 1/27/25 2/3/25 Yes Aaron Fields MD   fluconazole (DIFLUCAN) 150 MG tablet Take 1 tablet by mouth once for 1 dose Take after finishing course of antibiotics 25 Yes Aaron Fields MD   bictegravir-emtricitab-tenofovir alafenamide (BIKTARVY) -25 MG TABS per tablet Take 1 tablet by mouth daily 24  Yes Antonieta Carcamo MD       No Known Allergies       Tobacco History:  reports that she has never smoked. She has never been exposed to tobacco smoke. She has never used smokeless tobacco.  Alcohol use:  reports no history of alcohol use.  Drug use:

## 2025-01-27 NOTE — PROGRESS NOTES
Chief Complaint   Patient presents with    Other     Persistent vaginal discharge. Hx of BV. HIV +. Aware to schedule an annual exam     /64 (Site: Left Upper Arm, Position: Sitting, Cuff Size: Small Adult)   Ht 1.702 m (5' 7\")   Wt 55.5 kg (122 lb 6 oz)   LMP 01/04/2025 (Approximate)   BMI 19.17 kg/m²

## 2025-01-29 LAB
A VAGINAE DNA VAG QL NAA+PROBE: ABNORMAL SCORE
BVAB2 DNA VAG QL NAA+PROBE: ABNORMAL SCORE
C ALBICANS DNA VAG QL NAA+PROBE: NEGATIVE
C GLABRATA DNA VAG QL NAA+PROBE: NEGATIVE
C TRACH DNA SPEC QL NAA+PROBE: NEGATIVE
M GENITALIUM DNA SPEC QL NAA+PROBE: NEGATIVE
M HOMINIS DNA SPEC QL NAA+PROBE: POSITIVE
MEGA1 DNA VAG QL NAA+PROBE: ABNORMAL SCORE
N GONORRHOEA DNA VAG QL NAA+PROBE: NEGATIVE
T VAGINALIS DNA VAG QL NAA+PROBE: NEGATIVE
UREAPLASMA DNA SPEC QL NAA+PROBE: POSITIVE

## 2025-03-27 ENCOUNTER — HOSPITAL ENCOUNTER (EMERGENCY)
Facility: HOSPITAL | Age: 25
Discharge: HOME | End: 2025-03-27
Payer: MEDICAID

## 2025-03-27 VITALS
RESPIRATION RATE: 14 BRPM | HEIGHT: 67 IN | HEART RATE: 72 BPM | SYSTOLIC BLOOD PRESSURE: 103 MMHG | DIASTOLIC BLOOD PRESSURE: 67 MMHG | TEMPERATURE: 98.1 F | BODY MASS INDEX: 18.83 KG/M2 | OXYGEN SATURATION: 100 % | WEIGHT: 120 LBS

## 2025-03-27 DIAGNOSIS — L73.9 FOLLICULITIS: Primary | ICD-10-CM

## 2025-03-27 PROCEDURE — 99283 EMERGENCY DEPT VISIT LOW MDM: CPT

## 2025-03-27 RX ORDER — MUPIROCIN 20 MG/G
OINTMENT TOPICAL
Qty: 15 G | Refills: 0 | Status: SHIPPED | OUTPATIENT
Start: 2025-03-27 | End: 2025-04-03

## 2025-03-27 ASSESSMENT — PAIN - FUNCTIONAL ASSESSMENT: PAIN_FUNCTIONAL_ASSESSMENT: NONE - DENIES PAIN

## 2025-03-27 NOTE — ED PROVIDER NOTES
[facility]  EMERGENCY DEPARTMENT HISTORY AND PHYSICAL EXAM      Date: 3/27/2025  Patient Name: Melissa Ham  MRN: [id number]  Birthdate 2000  Date of evaluation: 3/27/2025  Provider: Rhoda Clayton PA-C   Note Started: 9:24 AM EDT 3/27/25    HISTORY OF PRESENT ILLNESS     Chief Complaint   Patient presents with    Abscess       History Provided By: Patient    HPI: Melissa Ham is a 25 y.o. female with past medical history listed below, presents for \"bump\" on her buttock that is been there for about 2 weeks.  It is nontender to palpation, she has had no drainage from the area.  She denies any fevers or chills.    PAST MEDICAL HISTORY   Past Medical History:  Past Medical History:   Diagnosis Date    HIV (human immunodeficiency virus infection) (HCC)        Past Surgical History:  Past Surgical History:   Procedure Laterality Date    EYE MUSCLE SURGERY Left 1/17/2024    LEFT EYE MUSCLE REPAIR performed by Navid Tyson MD at [facility] AMBULATORY OR    EYE SURGERY      UPPER GASTROINTESTINAL ENDOSCOPY N/A 5/16/2023    EGD BIOPSY performed by Magali Moss MD at [facility] ENDOSCOPY       Family History:  Family History   Family history unknown: Yes       Social History:  Social History     Tobacco Use    Smoking status: Never     Passive exposure: Never    Smokeless tobacco: Never   Vaping Use    Vaping status: Never Used   Substance Use Topics    Alcohol use: Never    Drug use: Yes     Types: Marijuana (Weed)     Comment: twice a week       Allergies:  No Known Allergies    PCP: Priti Merino MD    Current Meds:   No current facility-administered medications for this encounter.     Current Outpatient Medications   Medication Sig Dispense Refill    mupirocin (BACTROBAN) 2 % ointment Apply topically 3 times daily. 15 g 0    bictegravir-emtricitab-tenofovir alafenamide (BIKTARVY) -25 MG TABS per tablet Take 1 tablet by mouth daily 30 tablet 1       Social Determinants of Health:

## 2025-04-09 ENCOUNTER — TELEPHONE (OUTPATIENT)
Age: 25
End: 2025-04-09

## 2025-04-09 DIAGNOSIS — N89.8 VAGINAL DISCHARGE: Primary | ICD-10-CM

## 2025-04-09 NOTE — TELEPHONE ENCOUNTER
Returned the patient's call and she states for the last 3 to 4 days.  Per Dr Fields, patient scheduled for an ultrasound this afternoon.

## 2025-04-10 ENCOUNTER — HOSPITAL ENCOUNTER (EMERGENCY)
Facility: HOSPITAL | Age: 25
Discharge: HOME | End: 2025-04-10
Payer: MEDICAID

## 2025-04-10 ENCOUNTER — RESULTS FOLLOW-UP (OUTPATIENT)
Age: 25
End: 2025-04-10

## 2025-04-10 VITALS
DIASTOLIC BLOOD PRESSURE: 78 MMHG | HEART RATE: 69 BPM | RESPIRATION RATE: 19 BRPM | OXYGEN SATURATION: 100 % | TEMPERATURE: 98.8 F | BODY MASS INDEX: 18.83 KG/M2 | SYSTOLIC BLOOD PRESSURE: 113 MMHG | HEIGHT: 67 IN | WEIGHT: 120 LBS

## 2025-04-10 DIAGNOSIS — R11.2 NAUSEA AND VOMITING, UNSPECIFIED VOMITING TYPE: ICD-10-CM

## 2025-04-10 DIAGNOSIS — Z3A.01 LESS THAN 8 WEEKS GESTATION OF PREGNANCY: Primary | ICD-10-CM

## 2025-04-10 DIAGNOSIS — N91.2 AMENORRHEA: Primary | ICD-10-CM

## 2025-04-10 DIAGNOSIS — R45.851 SUICIDAL IDEATION: ICD-10-CM

## 2025-04-10 DIAGNOSIS — F32.A DEPRESSION, UNSPECIFIED DEPRESSION TYPE: ICD-10-CM

## 2025-04-10 LAB
ALBUMIN SERPL-MCNC: 4.1 G/DL (ref 3.5–5)
ALBUMIN/GLOB SERPL: 0.9 (ref 1.1–2.2)
ALP SERPL-CCNC: 77 U/L (ref 45–117)
ALT SERPL-CCNC: 31 U/L (ref 12–78)
AMPHET UR QL SCN: NEGATIVE
ANION GAP SERPL CALC-SCNC: 7 MMOL/L (ref 2–12)
APAP SERPL-MCNC: <2 UG/ML (ref 10–30)
APPEARANCE UR: CLEAR
AST SERPL W P-5'-P-CCNC: 16 U/L (ref 15–37)
BACTERIA URNS QL MICRO: NEGATIVE /HPF
BARBITURATES UR QL SCN: NEGATIVE
BASOPHILS # BLD: 0 K/UL (ref 0–0.1)
BASOPHILS NFR BLD: 0 % (ref 0–1)
BENZODIAZ UR QL: NEGATIVE
BILIRUB SERPL-MCNC: 0.6 MG/DL (ref 0.2–1)
BILIRUB UR QL: NEGATIVE
BUN SERPL-MCNC: 16 MG/DL (ref 6–20)
BUN/CREAT SERPL: 14 (ref 12–20)
CA-I BLD-MCNC: 10.1 MG/DL (ref 8.5–10.1)
CANNABINOIDS UR QL SCN: POSITIVE
CHLORIDE SERPL-SCNC: 106 MMOL/L (ref 97–108)
CO2 SERPL-SCNC: 23 MMOL/L (ref 21–32)
COCAINE UR QL SCN: NEGATIVE
COLOR UR: ABNORMAL
CREAT SERPL-MCNC: 1.11 MG/DL (ref 0.55–1.02)
DATE LAST DOSE: ABNORMAL
DATE LAST DOSE: ABNORMAL
DIFFERENTIAL METHOD BLD: ABNORMAL
DOSE AMOUNT: ABNORMAL UNITS
DOSE AMOUNT: ABNORMAL UNITS
EKG ATRIAL RATE: 58 BPM
EKG DIAGNOSIS: NORMAL
EKG P AXIS: 62 DEGREES
EKG P-R INTERVAL: 144 MS
EKG Q-T INTERVAL: 420 MS
EKG QRS DURATION: 80 MS
EKG QTC CALCULATION (BAZETT): 412 MS
EKG R AXIS: 75 DEGREES
EKG T AXIS: 54 DEGREES
EKG VENTRICULAR RATE: 58 BPM
EOSINOPHIL # BLD: 0 K/UL (ref 0–0.4)
EOSINOPHIL NFR BLD: 0 % (ref 0–7)
EPITH CASTS URNS QL MICRO: ABNORMAL /LPF
ERYTHROCYTE [DISTWIDTH] IN BLOOD BY AUTOMATED COUNT: 14 % (ref 11.5–14.5)
ETHANOL SERPL-MCNC: <10 MG/DL (ref 0–0.08)
GLOBULIN SER CALC-MCNC: 4.8 G/DL (ref 2–4)
GLUCOSE SERPL-MCNC: 106 MG/DL (ref 65–100)
GLUCOSE UR STRIP.AUTO-MCNC: NEGATIVE MG/DL
HCG SERPL QL: POSITIVE
HCG UR QL: POSITIVE
HCT VFR BLD AUTO: 37.5 % (ref 35–47)
HGB BLD-MCNC: 12.9 G/DL (ref 11.5–16)
HGB UR QL STRIP: NEGATIVE
IMM GRANULOCYTES # BLD AUTO: 0.04 K/UL (ref 0–0.04)
IMM GRANULOCYTES NFR BLD AUTO: 0.4 % (ref 0–0.5)
KETONES UR QL STRIP.AUTO: 20 MG/DL
LEUKOCYTE ESTERASE UR QL STRIP.AUTO: NEGATIVE
LYMPHOCYTES # BLD: 1.28 K/UL (ref 0.8–3.5)
LYMPHOCYTES NFR BLD: 13.7 % (ref 12–49)
Lab: ABNORMAL
MCH RBC QN AUTO: 33.4 PG (ref 26–34)
MCHC RBC AUTO-ENTMCNC: 34.4 G/DL (ref 30–36.5)
MCV RBC AUTO: 97.2 FL (ref 80–99)
METHADONE UR QL: NEGATIVE
MONOCYTES # BLD: 0.36 K/UL (ref 0–1)
MONOCYTES NFR BLD: 3.9 % (ref 5–13)
MUCOUS THREADS URNS QL MICRO: ABNORMAL /LPF
NEUTS SEG # BLD: 7.65 K/UL (ref 1.8–8)
NEUTS SEG NFR BLD: 82 % (ref 32–75)
NITRITE UR QL STRIP.AUTO: NEGATIVE
NRBC # BLD: 0 K/UL (ref 0–0.01)
NRBC BLD-RTO: 0 PER 100 WBC
OPIATES UR QL: NEGATIVE
PCP UR QL: NEGATIVE
PH UR STRIP: 5 (ref 5–8)
PLATELET # BLD AUTO: 260 K/UL (ref 150–400)
PMV BLD AUTO: 10.1 FL (ref 8.9–12.9)
POTASSIUM SERPL-SCNC: 3.7 MMOL/L (ref 3.5–5.1)
PROT SERPL-MCNC: 8.9 G/DL (ref 6.4–8.2)
PROT UR STRIP-MCNC: 30 MG/DL
RBC # BLD AUTO: 3.86 M/UL (ref 3.8–5.2)
RBC #/AREA URNS HPF: ABNORMAL /HPF (ref 0–5)
SALICYLATES SERPL-MCNC: <1.7 MG/DL (ref 2.8–20)
SODIUM SERPL-SCNC: 136 MMOL/L (ref 136–145)
SP GR UR REFRACTOMETRY: >1.03 (ref 1–1.03)
URINE CULTURE IF INDICATED: ABNORMAL
UROBILINOGEN UR QL STRIP.AUTO: 0.1 EU/DL (ref 0.1–1)
WBC # BLD AUTO: 9.3 K/UL (ref 3.6–11)
WBC URNS QL MICRO: ABNORMAL /HPF (ref 0–4)

## 2025-04-10 PROCEDURE — 81001 URINALYSIS AUTO W/SCOPE: CPT

## 2025-04-10 PROCEDURE — 80053 COMPREHEN METABOLIC PANEL: CPT

## 2025-04-10 PROCEDURE — 85025 COMPLETE CBC W/AUTO DIFF WBC: CPT

## 2025-04-10 PROCEDURE — 96376 TX/PRO/DX INJ SAME DRUG ADON: CPT

## 2025-04-10 PROCEDURE — 93005 ELECTROCARDIOGRAM TRACING: CPT

## 2025-04-10 PROCEDURE — 80179 DRUG ASSAY SALICYLATE: CPT

## 2025-04-10 PROCEDURE — 96361 HYDRATE IV INFUSION ADD-ON: CPT

## 2025-04-10 PROCEDURE — 81025 URINE PREGNANCY TEST: CPT

## 2025-04-10 PROCEDURE — 2580000003 HC RX 258

## 2025-04-10 PROCEDURE — 80307 DRUG TEST PRSMV CHEM ANLYZR: CPT

## 2025-04-10 PROCEDURE — 84703 CHORIONIC GONADOTROPIN ASSAY: CPT

## 2025-04-10 PROCEDURE — 99285 EMERGENCY DEPT VISIT HI MDM: CPT

## 2025-04-10 PROCEDURE — 96374 THER/PROPH/DIAG INJ IV PUSH: CPT

## 2025-04-10 PROCEDURE — 36415 COLL VENOUS BLD VENIPUNCTURE: CPT

## 2025-04-10 PROCEDURE — 6360000002 HC RX W HCPCS

## 2025-04-10 PROCEDURE — 80143 DRUG ASSAY ACETAMINOPHEN: CPT

## 2025-04-10 PROCEDURE — 82077 ASSAY SPEC XCP UR&BREATH IA: CPT

## 2025-04-10 RX ORDER — ONDANSETRON 2 MG/ML
4 INJECTION INTRAMUSCULAR; INTRAVENOUS ONCE
Status: COMPLETED | OUTPATIENT
Start: 2025-04-10 | End: 2025-04-10

## 2025-04-10 RX ORDER — 0.9 % SODIUM CHLORIDE 0.9 %
1000 INTRAVENOUS SOLUTION INTRAVENOUS ONCE
Status: COMPLETED | OUTPATIENT
Start: 2025-04-10 | End: 2025-04-10

## 2025-04-10 RX ORDER — ONDANSETRON 4 MG/1
4 TABLET, ORALLY DISINTEGRATING ORAL 3 TIMES DAILY PRN
Qty: 21 TABLET | Refills: 0 | Status: SHIPPED | OUTPATIENT
Start: 2025-04-10

## 2025-04-10 RX ORDER — PNV NO.95/FERROUS FUM/FOLIC AC 28MG-0.8MG
28 TABLET ORAL DAILY
Qty: 30 TABLET | Refills: 0 | Status: SHIPPED | OUTPATIENT
Start: 2025-04-10

## 2025-04-10 RX ADMIN — ONDANSETRON 4 MG: 2 INJECTION, SOLUTION INTRAMUSCULAR; INTRAVENOUS at 11:22

## 2025-04-10 RX ADMIN — SODIUM CHLORIDE 1000 ML: 0.9 INJECTION, SOLUTION INTRAVENOUS at 09:37

## 2025-04-10 RX ADMIN — ONDANSETRON 4 MG: 2 INJECTION, SOLUTION INTRAMUSCULAR; INTRAVENOUS at 09:39

## 2025-04-10 ASSESSMENT — PAIN SCALES - GENERAL
PAINLEVEL_OUTOF10: 5
PAINLEVEL_OUTOF10: 4

## 2025-04-10 ASSESSMENT — PAIN DESCRIPTION - LOCATION: LOCATION: ABDOMEN

## 2025-04-10 NOTE — TELEPHONE ENCOUNTER
Message sent to pt  US: normal except for cyst  Pt seen in ED today--> UPT positive  Order placed for HCG level

## 2025-04-10 NOTE — BSMART NOTE
Comprehensive Assessment Form Part 1      Section I - Disposition    The Medical Doctor to Psychiatrist conference was not completed.  The Medical Doctor is in agreement with intake's disposition.   The plan is discharge and follow up with Bety Howard NP at [facility] on 5/5/25 at 1pm.  The on-call Psychiatrist was Dr. Carcamo.  The admitting Psychiatrist will be Dr. SEBASTIAN/MOMO.  The admitting Diagnosis is N/A.      Section II - Integrated Summary    Patient assessed in ED room 20 with sitter noted at door. Patient dressed in green hospital gown, with medical mask on, laying in stretcher during assessment. Patient cooperative throughout assessment. Patient A&O x4. Patient appears well kept. Patient presents as sad and somewhat anxious with congruent affect- tearful at times during assessment. Patient presents with fair eye contact. Patient presents as hyper verbal with somewhat rapid, but clear, speech. Patient presents with linear thought process. Patient does not appear to be responding to internal stimuli nor does she present as psychotic. When asked what brought patient to ED today, patient states that she has had \"a lot going on.\"  She reports passive suicidal thoughts for the past 6 months with no plan and/or intent. She denies HI and AVH.    Patient states that her brother was hit by a vehicle while riding a motorized scooter in October 2024. She states that he was in the hospital after the accident until March 2025 when he ultimately succumbed to his injuries. Since he passed away, patient reports feeling depressed with decrease in motivation and inability to focus. She notes that she typically \"smokes (THC) or try to occupy myself\" in order to avoid the hard feelings of his loss.     Per chart, patient with Hx of psychosis. She states that she has been admitted to [facility] in the past. Per chart, patient's last admission to [facility] was in July 2024. At that time, patient received [place] (Invega.) However, once

## 2025-04-10 NOTE — DISCHARGE INSTRUCTIONS
Thank you for choosing our Emergency Department for your care.  It is our privilege to care for you in your time of need.  In the next several days, you may receive a survey via email or mailed to your home about your experience with our team.  We would greatly appreciate you taking a few minutes to complete the survey, as we use this information to learn what we have done well and what we could be doing better. Thank you for trusting us with your care!    Below you will find a list of your tests from today's visit.   Labs and Radiology Studies  Recent Results (from the past 12 hours)   CBC with Auto Differential    Collection Time: 04/10/25  8:41 AM   Result Value Ref Range    WBC 9.3 3.6 - 11.0 K/uL    RBC 3.86 3.80 - 5.20 M/uL    Hemoglobin 12.9 11.5 - 16.0 g/dL    Hematocrit 37.5 35.0 - 47.0 %    MCV 97.2 80.0 - 99.0 FL    MCH 33.4 26.0 - 34.0 PG    MCHC 34.4 30.0 - 36.5 g/dL    RDW 14.0 11.5 - 14.5 %    Platelets 260 150 - 400 K/uL    MPV 10.1 8.9 - 12.9 FL    Nucleated RBCs 0.0 0.0  WBC    nRBC 0.00 0.00 - 0.01 K/uL    Neutrophils % 82.0 (H) 32.0 - 75.0 %    Lymphocytes % 13.7 12.0 - 49.0 %    Monocytes % 3.9 (L) 5.0 - 13.0 %    Eosinophils % 0.0 0.0 - 7.0 %    Basophils % 0.0 0.0 - 1.0 %    Immature Granulocytes % 0.4 0 - 0.5 %    Neutrophils Absolute 7.65 1.80 - 8.00 K/UL    Lymphocytes Absolute 1.28 0.80 - 3.50 K/UL    Monocytes Absolute 0.36 0.00 - 1.00 K/UL    Eosinophils Absolute 0.00 0.00 - 0.40 K/UL    Basophils Absolute 0.00 0.00 - 0.10 K/UL    Immature Granulocytes Absolute 0.04 0.00 - 0.04 K/UL    Differential Type AUTOMATED     CMP    Collection Time: 04/10/25  8:41 AM   Result Value Ref Range    Sodium 136 136 - 145 mmol/L    Potassium 3.7 3.5 - 5.1 mmol/L    Chloride 106 97 - 108 mmol/L    CO2 23 21 - 32 mmol/L    Anion Gap 7 2 - 12 mmol/L    Glucose 106 (H) 65 - 100 mg/dL    BUN 16 6 - 20 mg/dL    Creatinine 1.11 (H) 0.55 - 1.02 mg/dL    BUN/Creatinine Ratio 14 12 - 20      Est, Glom

## 2025-04-10 NOTE — ED NOTES
Safety plan not indicated due to patient being low risk per BSMART. BSMART scheduled appointment for patient May 5th at 1PM at [facility]. Pt aware, agreeable to follow up.

## 2025-04-10 NOTE — ED PROVIDER NOTES
[facility] EMERGENCY DEPT  EMERGENCY DEPARTMENT HISTORY AND PHYSICAL EXAM      Date of evaluation: 4/10/2025  Patient Name: Melissa Ham  Birthdate 2000  MRN: [id number]  ED Provider: Jesus Kapoor PA-C   Note Started: 8:04 AM EDT 4/10/25    HISTORY OF PRESENT ILLNESS     Chief Complaint   Patient presents with    Psychiatric Evaluation       History Provided By: Patient, only     HPI: Melissa Ham is a 25 y.o. female with past medical history as listed below presents emergency department for evaluation of multiple complaints.  Patient reports that she has been under a lot of stress recently after the passing of her brother 1 month prior reports that she has been going through a lot since his passing and started having thoughts of suicide.  Patient also reporting that she accidentally took 2 doxycycline pills instead of her ibuprofen for generalized bodyaches and pains.  Reports the accidental ingestion occurred around 1 PM yesterday states that she has had increased stomach cramps and occasional nausea and vomiting.  The medication most recently vomited 1 hour prior to arrival.  Patient additionally requesting evaluation of a \"small bump\" on her left glued.  Reports she has been seen for this before and was prescribed a topical medication but would like to have it evaluated again today.    PAST MEDICAL HISTORY   Past Medical History:  Past Medical History:   Diagnosis Date    HIV (human immunodeficiency virus infection) (HCC)        Past Surgical History:  Past Surgical History:   Procedure Laterality Date    EYE MUSCLE SURGERY Left 1/17/2024    LEFT EYE MUSCLE REPAIR performed by Navid Tyson MD at [facility] AMBULATORY OR    EYE SURGERY      UPPER GASTROINTESTINAL ENDOSCOPY N/A 5/16/2023    EGD BIOPSY performed by Magali Moss MD at [facility] ENDOSCOPY       Family History:  Family History   Family history unknown: Yes       Social History:  Social History     Tobacco Use    Smoking status: Never     Passive  exposure: Never    Smokeless tobacco: Never   Vaping Use    Vaping status: Never Used   Substance Use Topics    Alcohol use: Never    Drug use: Yes     Types: Marijuana (Weed)     Comment: twice a week       Allergies:  No Known Allergies    PCP: No, Pcp    Current Meds:   No current facility-administered medications for this encounter.     Current Outpatient Medications   Medication Sig Dispense Refill    ondansetron (ZOFRAN-ODT) 4 MG disintegrating tablet Take 1 tablet by mouth 3 times daily as needed for Nausea or Vomiting 21 tablet 0    Prenatal Vit-Fe Fumarate-FA (PRENATAL VITAMINS) 28-0.8 MG TABS Take 1 tablet by mouth daily 30 tablet 0    bictegravir-emtricitab-tenofovir alafenamide (BIKTARVY) -25 MG TABS per tablet Take 1 tablet by mouth daily 30 tablet 1       Social Determinants of Health:   Social Drivers of Health     Tobacco Use: Low Risk  (4/10/2025)    Patient History     Smoking Tobacco Use: Never     Smokeless Tobacco Use: Never     Passive Exposure: Never   Alcohol Use: Not At Risk (4/10/2025)    AUDIT-C     Frequency of Alcohol Consumption: Never     Average Number of Drinks: Patient does not drink     Frequency of Binge Drinking: Never   Financial Resource Strain: Low Risk  (5/23/2023)    Overall Financial Resource Strain (CARDIA)     Difficulty of Paying Living Expenses: Not very hard   Food Insecurity: No Food Insecurity (7/28/2024)    Hunger Vital Sign     Worried About Running Out of Food in the Last Year: Never true     Ran Out of Food in the Last Year: Never true   Recent Concern: Food Insecurity - Food Insecurity Present (6/3/2024)    Received [facility]    Hunger Vital Sign     Worried About Running Out of Food in the Last Year: Often true     Ran Out of Food in the Last Year: Often true   Transportation Needs: No Transportation Needs (7/28/2024)    PRAPARE - Transportation     Lack of Transportation (Medical):

## 2025-04-10 NOTE — ED NOTES
Pt belongings to include jacket, shirt, pants, keys placed in labeled pt belongings bag and placed in locker 20 at nurse's station 3. Pt changed into green gown.

## 2025-04-10 NOTE — ED NOTES
5 bracelets, 1 ring, and bra added to belonging bag in assigned locker at this time. Pt on cardiac monitor, blood pressure, and pulse ox at this time due to elevated HR

## 2025-04-10 NOTE — ED TRIAGE NOTES
accidentally took 2 pills of doxycyline, unknown dosage, took approx at 1300 yesterday,  was cleaning room and moving too fast, thought it was motrin.     Also reports having in-grown hair to her sacrum, has used cream with no relief.     Reports that her brother recently passed away and has been going through a lot mentally, reports she has been struggling even before that.  was here in the past and was given an injection and it helped her. Reports suicidal thoughts- denies any plan.

## 2025-06-09 ENCOUNTER — APPOINTMENT (OUTPATIENT)
Facility: HOSPITAL | Age: 25
End: 2025-06-09
Payer: MEDICAID

## 2025-06-09 ENCOUNTER — HOSPITAL ENCOUNTER (EMERGENCY)
Facility: HOSPITAL | Age: 25
Discharge: HOME | End: 2025-06-09
Attending: EMERGENCY MEDICINE
Payer: MEDICAID

## 2025-06-09 VITALS
BODY MASS INDEX: 18.83 KG/M2 | WEIGHT: 120 LBS | OXYGEN SATURATION: 100 % | HEART RATE: 85 BPM | SYSTOLIC BLOOD PRESSURE: 137 MMHG | HEIGHT: 67 IN | RESPIRATION RATE: 16 BRPM | DIASTOLIC BLOOD PRESSURE: 74 MMHG | TEMPERATURE: 98.5 F

## 2025-06-09 DIAGNOSIS — R10.32 LEFT LOWER QUADRANT ABDOMINAL PAIN: Primary | ICD-10-CM

## 2025-06-09 DIAGNOSIS — K52.9 COLITIS: ICD-10-CM

## 2025-06-09 DIAGNOSIS — K62.5 RECTAL BLEED: ICD-10-CM

## 2025-06-09 LAB
ALBUMIN SERPL-MCNC: 4 G/DL (ref 3.5–5)
ALBUMIN/GLOB SERPL: 1.2 (ref 1.1–2.2)
ALP SERPL-CCNC: 55 U/L (ref 45–117)
ALT SERPL-CCNC: 15 U/L (ref 12–78)
ANION GAP SERPL CALC-SCNC: 12 MMOL/L (ref 2–12)
AST SERPL W P-5'-P-CCNC: 13 U/L (ref 15–37)
BASOPHILS # BLD: 0.02 K/UL (ref 0–0.1)
BASOPHILS NFR BLD: 0.3 % (ref 0–1)
BILIRUB SERPL-MCNC: 0.7 MG/DL (ref 0.2–1)
BUN SERPL-MCNC: 12 MG/DL (ref 6–20)
BUN/CREAT SERPL: 13 (ref 12–20)
CA-I BLD-MCNC: 9.5 MG/DL (ref 8.5–10.1)
CHLORIDE SERPL-SCNC: 107 MMOL/L (ref 97–108)
CO2 SERPL-SCNC: 19 MMOL/L (ref 21–32)
CREAT SERPL-MCNC: 0.94 MG/DL (ref 0.55–1.02)
DIFFERENTIAL METHOD BLD: ABNORMAL
EOSINOPHIL # BLD: 0 K/UL (ref 0–0.4)
EOSINOPHIL NFR BLD: 0 % (ref 0–7)
ERYTHROCYTE [DISTWIDTH] IN BLOOD BY AUTOMATED COUNT: 12.3 % (ref 11.5–14.5)
GLOBULIN SER CALC-MCNC: 3.4 G/DL (ref 2–4)
GLUCOSE SERPL-MCNC: 125 MG/DL (ref 65–100)
HCG SERPL QL: NEGATIVE
HCT VFR BLD AUTO: 33.9 % (ref 35–47)
HGB BLD-MCNC: 12.1 G/DL (ref 11.5–16)
IMM GRANULOCYTES # BLD AUTO: 0.03 K/UL (ref 0–0.04)
IMM GRANULOCYTES NFR BLD AUTO: 0.4 % (ref 0–0.5)
LIPASE SERPL-CCNC: 15 U/L (ref 13–75)
LYMPHOCYTES # BLD: 0.82 K/UL (ref 0.8–3.5)
LYMPHOCYTES NFR BLD: 12.3 % (ref 12–49)
MCH RBC QN AUTO: 34.7 PG (ref 26–34)
MCHC RBC AUTO-ENTMCNC: 35.7 G/DL (ref 30–36.5)
MCV RBC AUTO: 97.1 FL (ref 80–99)
MONOCYTES # BLD: 0.1 K/UL (ref 0–1)
MONOCYTES NFR BLD: 1.5 % (ref 5–13)
NEUTS SEG # BLD: 5.7 K/UL (ref 1.8–8)
NEUTS SEG NFR BLD: 85.5 % (ref 32–75)
NRBC # BLD: 0 K/UL (ref 0–0.01)
NRBC BLD-RTO: 0 PER 100 WBC
PLATELET # BLD AUTO: 235 K/UL (ref 150–400)
PMV BLD AUTO: 9.9 FL (ref 8.9–12.9)
POTASSIUM SERPL-SCNC: 3.8 MMOL/L (ref 3.5–5.1)
PROT SERPL-MCNC: 7.4 G/DL (ref 6.4–8.2)
RBC # BLD AUTO: 3.49 M/UL (ref 3.8–5.2)
SODIUM SERPL-SCNC: 138 MMOL/L (ref 136–145)
WBC # BLD AUTO: 6.7 K/UL (ref 3.6–11)

## 2025-06-09 PROCEDURE — 99285 EMERGENCY DEPT VISIT HI MDM: CPT

## 2025-06-09 PROCEDURE — 96374 THER/PROPH/DIAG INJ IV PUSH: CPT

## 2025-06-09 PROCEDURE — 6360000004 HC RX CONTRAST MEDICATION: Performed by: EMERGENCY MEDICINE

## 2025-06-09 PROCEDURE — 96375 TX/PRO/DX INJ NEW DRUG ADDON: CPT

## 2025-06-09 PROCEDURE — 80053 COMPREHEN METABOLIC PANEL: CPT

## 2025-06-09 PROCEDURE — 84703 CHORIONIC GONADOTROPIN ASSAY: CPT

## 2025-06-09 PROCEDURE — 85025 COMPLETE CBC W/AUTO DIFF WBC: CPT

## 2025-06-09 PROCEDURE — 74174 CTA ABD&PLVS W/CONTRAST: CPT

## 2025-06-09 PROCEDURE — 70450 CT HEAD/BRAIN W/O DYE: CPT

## 2025-06-09 PROCEDURE — 6370000000 HC RX 637 (ALT 250 FOR IP): Performed by: EMERGENCY MEDICINE

## 2025-06-09 PROCEDURE — 83690 ASSAY OF LIPASE: CPT

## 2025-06-09 PROCEDURE — 6360000002 HC RX W HCPCS: Performed by: EMERGENCY MEDICINE

## 2025-06-09 PROCEDURE — 2580000003 HC RX 258: Performed by: EMERGENCY MEDICINE

## 2025-06-09 RX ORDER — 0.9 % SODIUM CHLORIDE 0.9 %
1000 INTRAVENOUS SOLUTION INTRAVENOUS
Status: COMPLETED | OUTPATIENT
Start: 2025-06-09 | End: 2025-06-09

## 2025-06-09 RX ORDER — OXYCODONE AND ACETAMINOPHEN 5; 325 MG/1; MG/1
1 TABLET ORAL
Refills: 0 | Status: COMPLETED | OUTPATIENT
Start: 2025-06-09 | End: 2025-06-09

## 2025-06-09 RX ORDER — ONDANSETRON 4 MG/1
4 TABLET, ORALLY DISINTEGRATING ORAL ONCE
Status: COMPLETED | OUTPATIENT
Start: 2025-06-09 | End: 2025-06-09

## 2025-06-09 RX ORDER — OXYCODONE AND ACETAMINOPHEN 5; 325 MG/1; MG/1
1 TABLET ORAL EVERY 6 HOURS PRN
Qty: 28 TABLET | Refills: 0 | Status: SHIPPED | OUTPATIENT
Start: 2025-06-09 | End: 2025-06-16

## 2025-06-09 RX ORDER — IOPAMIDOL 755 MG/ML
100 INJECTION, SOLUTION INTRAVASCULAR
Status: COMPLETED | OUTPATIENT
Start: 2025-06-09 | End: 2025-06-09

## 2025-06-09 RX ORDER — ONDANSETRON 4 MG/1
4 TABLET, ORALLY DISINTEGRATING ORAL EVERY 8 HOURS PRN
Qty: 20 TABLET | Refills: 0 | Status: SHIPPED | OUTPATIENT
Start: 2025-06-09

## 2025-06-09 RX ORDER — MORPHINE SULFATE 4 MG/ML
4 INJECTION, SOLUTION INTRAMUSCULAR; INTRAVENOUS
Refills: 0 | Status: COMPLETED | OUTPATIENT
Start: 2025-06-09 | End: 2025-06-09

## 2025-06-09 RX ORDER — ONDANSETRON 2 MG/ML
4 INJECTION INTRAMUSCULAR; INTRAVENOUS ONCE
Status: COMPLETED | OUTPATIENT
Start: 2025-06-09 | End: 2025-06-09

## 2025-06-09 RX ADMIN — MORPHINE SULFATE 4 MG: 4 INJECTION, SOLUTION INTRAMUSCULAR; INTRAVENOUS at 19:56

## 2025-06-09 RX ADMIN — IOPAMIDOL 100 ML: 755 INJECTION, SOLUTION INTRAVENOUS at 20:49

## 2025-06-09 RX ADMIN — ONDANSETRON 4 MG: 4 TABLET, ORALLY DISINTEGRATING ORAL at 22:05

## 2025-06-09 RX ADMIN — AMOXICILLIN AND CLAVULANATE POTASSIUM 1 TABLET: 875; 125 TABLET, FILM COATED ORAL at 22:05

## 2025-06-09 RX ADMIN — OXYCODONE AND ACETAMINOPHEN 1 TABLET: 5; 325 TABLET ORAL at 22:05

## 2025-06-09 RX ADMIN — ONDANSETRON 4 MG: 2 INJECTION, SOLUTION INTRAMUSCULAR; INTRAVENOUS at 19:49

## 2025-06-09 RX ADMIN — SODIUM CHLORIDE 1000 ML: 0.9 INJECTION, SOLUTION INTRAVENOUS at 19:49

## 2025-06-09 ASSESSMENT — PAIN DESCRIPTION - ORIENTATION
ORIENTATION: LOWER;LEFT
ORIENTATION: LEFT;LOWER

## 2025-06-09 ASSESSMENT — PAIN SCALES - GENERAL
PAINLEVEL_OUTOF10: 7
PAINLEVEL_OUTOF10: 10
PAINLEVEL_OUTOF10: 10

## 2025-06-09 ASSESSMENT — PAIN DESCRIPTION - LOCATION
LOCATION: ABDOMEN
LOCATION: ABDOMEN

## 2025-06-09 ASSESSMENT — PAIN - FUNCTIONAL ASSESSMENT: PAIN_FUNCTIONAL_ASSESSMENT: 0-10

## 2025-06-09 NOTE — ED TRIAGE NOTES
Reports L sided abd cramping, also reports noticing blood when wiping after having BM. LMP this past Wednesday. Denies urinary s/s.

## 2025-06-09 NOTE — ED PROVIDER NOTES
errors are inadvertently transcribed by the computer software. Please disregards these errors. Please excuse any errors that have escaped final proofreading.)     Hemanth Barba MD  06/09/25 0406

## 2025-06-10 NOTE — DISCHARGE INSTRUCTIONS
generated. Coronal and sagittal reformats. CT dose reduction was achieved through use of a standardized protocol tailored for this examination and automatic exposure control for dose modulation.  FINDINGS: The ventricles and sulci are normal in size, shape and configuration. There is no significant white matter disease. There is no intracranial hemorrhage, extra-axial collection, or mass effect. The basilar cisterns are open. No CT evidence of acute infarct. The bone windows demonstrate no abnormalities. The visualized portions of the paranasal sinuses and mastoid air cells are clear.     No evidence of acute process. Electronically signed by ALISA FORREST    ------------------------------------------------------------------------------------------------------------  The evaluation and treatment you received in the Emergency Department were for an urgent problem. It is important that you follow-up with a doctor, nurse practitioner, or physician assistant to:  (1) confirm your diagnosis,  (2) re-evaluation of changes in your illness and treatment, and (3) for ongoing care. Please take your discharge instructions with you when you go to your follow-up appointment.     If you have any problem arranging a follow-up appointment, contact us!  If your symptoms become worse or you do not improve as expected, please return to us. We are available 24 hours a day.     If a prescription has been provided, please fill it as soon as possible to prevent a delay in treatment. If you have any questions or reservations about taking the medication due to side effects or interactions with other medications, please call your primary care provider or contact us directly.  Again, THANK YOU for choosing us to care for YOU!

## 2025-07-09 ENCOUNTER — HOSPITAL ENCOUNTER (EMERGENCY)
Facility: HOSPITAL | Age: 25
Discharge: HOME | End: 2025-07-09
Attending: EMERGENCY MEDICINE
Payer: MEDICAID

## 2025-07-09 VITALS
HEART RATE: 62 BPM | BODY MASS INDEX: 18.36 KG/M2 | HEIGHT: 67 IN | RESPIRATION RATE: 14 BRPM | OXYGEN SATURATION: 100 % | SYSTOLIC BLOOD PRESSURE: 107 MMHG | WEIGHT: 117 LBS | TEMPERATURE: 97.9 F | DIASTOLIC BLOOD PRESSURE: 67 MMHG

## 2025-07-09 DIAGNOSIS — R11.0 NAUSEA: ICD-10-CM

## 2025-07-09 DIAGNOSIS — R10.33 PERIUMBILICAL ABDOMINAL PAIN: Primary | ICD-10-CM

## 2025-07-09 LAB
ALBUMIN SERPL-MCNC: 3.8 G/DL (ref 3.5–5)
ALBUMIN/GLOB SERPL: 1 (ref 1.1–2.2)
ALP SERPL-CCNC: 54 U/L (ref 45–117)
ALT SERPL-CCNC: 15 U/L (ref 12–78)
ANION GAP SERPL CALC-SCNC: 6 MMOL/L (ref 2–12)
APPEARANCE UR: CLEAR
AST SERPL W P-5'-P-CCNC: 13 U/L (ref 15–37)
BACTERIA URNS QL MICRO: NEGATIVE /HPF
BASOPHILS # BLD: 0.02 K/UL (ref 0–0.1)
BASOPHILS NFR BLD: 0.5 % (ref 0–1)
BILIRUB SERPL-MCNC: 0.6 MG/DL (ref 0.2–1)
BILIRUB UR QL: NEGATIVE
BUN SERPL-MCNC: 9 MG/DL (ref 6–20)
BUN/CREAT SERPL: 11 (ref 12–20)
CA-I BLD-MCNC: 9.6 MG/DL (ref 8.5–10.1)
CHLORIDE SERPL-SCNC: 109 MMOL/L (ref 97–108)
CO2 SERPL-SCNC: 23 MMOL/L (ref 21–32)
COLOR UR: ABNORMAL
CREAT SERPL-MCNC: 0.83 MG/DL (ref 0.55–1.02)
DIFFERENTIAL METHOD BLD: ABNORMAL
EOSINOPHIL # BLD: 0.16 K/UL (ref 0–0.4)
EOSINOPHIL NFR BLD: 3.8 % (ref 0–7)
EPITH CASTS URNS QL MICRO: ABNORMAL /LPF
ERYTHROCYTE [DISTWIDTH] IN BLOOD BY AUTOMATED COUNT: 13 % (ref 11.5–14.5)
GLOBULIN SER CALC-MCNC: 3.7 G/DL (ref 2–4)
GLUCOSE SERPL-MCNC: 74 MG/DL (ref 65–100)
GLUCOSE UR STRIP.AUTO-MCNC: NEGATIVE MG/DL
HCG SERPL QL: NEGATIVE
HCT VFR BLD AUTO: 35 % (ref 35–47)
HGB BLD-MCNC: 11.7 G/DL (ref 11.5–16)
HGB UR QL STRIP: NEGATIVE
IMM GRANULOCYTES # BLD AUTO: 0.01 K/UL (ref 0–0.04)
IMM GRANULOCYTES NFR BLD AUTO: 0.2 % (ref 0–0.5)
KETONES UR QL STRIP.AUTO: 5 MG/DL
LEUKOCYTE ESTERASE UR QL STRIP.AUTO: NEGATIVE
LIPASE SERPL-CCNC: 19 U/L (ref 13–75)
LYMPHOCYTES # BLD: 2.62 K/UL (ref 0.8–3.5)
LYMPHOCYTES NFR BLD: 62.4 % (ref 12–49)
MCH RBC QN AUTO: 33.5 PG (ref 26–34)
MCHC RBC AUTO-ENTMCNC: 33.4 G/DL (ref 30–36.5)
MCV RBC AUTO: 100.3 FL (ref 80–99)
MONOCYTES # BLD: 0.28 K/UL (ref 0–1)
MONOCYTES NFR BLD: 6.7 % (ref 5–13)
MUCOUS THREADS URNS QL MICRO: ABNORMAL /LPF
NEUTS SEG # BLD: 1.11 K/UL (ref 1.8–8)
NEUTS SEG NFR BLD: 26.4 % (ref 32–75)
NITRITE UR QL STRIP.AUTO: NEGATIVE
NRBC # BLD: 0 K/UL (ref 0–0.01)
NRBC BLD-RTO: 0 PER 100 WBC
PH UR STRIP: 7 (ref 5–8)
PLATELET # BLD AUTO: 201 K/UL (ref 150–400)
PMV BLD AUTO: 10.1 FL (ref 8.9–12.9)
POTASSIUM SERPL-SCNC: 3.8 MMOL/L (ref 3.5–5.1)
PROT SERPL-MCNC: 7.5 G/DL (ref 6.4–8.2)
PROT UR STRIP-MCNC: NEGATIVE MG/DL
RBC # BLD AUTO: 3.49 M/UL (ref 3.8–5.2)
RBC #/AREA URNS HPF: ABNORMAL /HPF (ref 0–5)
SODIUM SERPL-SCNC: 138 MMOL/L (ref 136–145)
SP GR UR REFRACTOMETRY: 1.02 (ref 1–1.03)
UROBILINOGEN UR QL STRIP.AUTO: 0.1 EU/DL (ref 0.1–1)
WBC # BLD AUTO: 4.2 K/UL (ref 3.6–11)
WBC URNS QL MICRO: ABNORMAL /HPF (ref 0–4)

## 2025-07-09 PROCEDURE — 6360000002 HC RX W HCPCS: Performed by: EMERGENCY MEDICINE

## 2025-07-09 PROCEDURE — 96374 THER/PROPH/DIAG INJ IV PUSH: CPT

## 2025-07-09 PROCEDURE — 83690 ASSAY OF LIPASE: CPT

## 2025-07-09 PROCEDURE — 2580000003 HC RX 258: Performed by: EMERGENCY MEDICINE

## 2025-07-09 PROCEDURE — 36415 COLL VENOUS BLD VENIPUNCTURE: CPT

## 2025-07-09 PROCEDURE — 99284 EMERGENCY DEPT VISIT MOD MDM: CPT

## 2025-07-09 PROCEDURE — 85025 COMPLETE CBC W/AUTO DIFF WBC: CPT

## 2025-07-09 PROCEDURE — 96375 TX/PRO/DX INJ NEW DRUG ADDON: CPT

## 2025-07-09 PROCEDURE — 81001 URINALYSIS AUTO W/SCOPE: CPT

## 2025-07-09 PROCEDURE — 80053 COMPREHEN METABOLIC PANEL: CPT

## 2025-07-09 PROCEDURE — 84703 CHORIONIC GONADOTROPIN ASSAY: CPT

## 2025-07-09 RX ORDER — DICYCLOMINE HCL 20 MG
20 TABLET ORAL 3 TIMES DAILY PRN
Qty: 15 TABLET | Refills: 0 | Status: SHIPPED | OUTPATIENT
Start: 2025-07-09

## 2025-07-09 RX ORDER — 0.9 % SODIUM CHLORIDE 0.9 %
1000 INTRAVENOUS SOLUTION INTRAVENOUS ONCE
Status: COMPLETED | OUTPATIENT
Start: 2025-07-09 | End: 2025-07-09

## 2025-07-09 RX ORDER — ONDANSETRON 2 MG/ML
4 INJECTION INTRAMUSCULAR; INTRAVENOUS ONCE
Status: COMPLETED | OUTPATIENT
Start: 2025-07-09 | End: 2025-07-09

## 2025-07-09 RX ORDER — MORPHINE SULFATE 4 MG/ML
4 INJECTION, SOLUTION INTRAMUSCULAR; INTRAVENOUS
Refills: 0 | Status: COMPLETED | OUTPATIENT
Start: 2025-07-09 | End: 2025-07-09

## 2025-07-09 RX ORDER — KETOROLAC TROMETHAMINE 10 MG/1
10 TABLET, FILM COATED ORAL EVERY 8 HOURS PRN
Qty: 15 TABLET | Refills: 0 | Status: SHIPPED | OUTPATIENT
Start: 2025-07-09

## 2025-07-09 RX ORDER — KETOROLAC TROMETHAMINE 15 MG/ML
15 INJECTION, SOLUTION INTRAMUSCULAR; INTRAVENOUS ONCE
Status: COMPLETED | OUTPATIENT
Start: 2025-07-09 | End: 2025-07-09

## 2025-07-09 RX ORDER — ONDANSETRON 4 MG/1
4 TABLET, ORALLY DISINTEGRATING ORAL 3 TIMES DAILY PRN
Qty: 12 TABLET | Refills: 0 | Status: SHIPPED | OUTPATIENT
Start: 2025-07-09 | End: 2025-07-09 | Stop reason: ALTCHOICE

## 2025-07-09 RX ORDER — PROMETHAZINE HYDROCHLORIDE 12.5 MG/1
12.5 TABLET ORAL 3 TIMES DAILY PRN
Qty: 12 TABLET | Refills: 0 | Status: SHIPPED | OUTPATIENT
Start: 2025-07-09 | End: 2025-07-16

## 2025-07-09 RX ADMIN — SODIUM CHLORIDE 1000 ML: 0.9 INJECTION, SOLUTION INTRAVENOUS at 11:22

## 2025-07-09 RX ADMIN — KETOROLAC TROMETHAMINE 15 MG: 15 INJECTION, SOLUTION INTRAMUSCULAR; INTRAVENOUS at 11:23

## 2025-07-09 RX ADMIN — ONDANSETRON 4 MG: 2 INJECTION, SOLUTION INTRAMUSCULAR; INTRAVENOUS at 11:25

## 2025-07-09 RX ADMIN — MORPHINE SULFATE 4 MG: 4 INJECTION, SOLUTION INTRAMUSCULAR; INTRAVENOUS at 11:26

## 2025-07-09 ASSESSMENT — LIFESTYLE VARIABLES
HOW MANY STANDARD DRINKS CONTAINING ALCOHOL DO YOU HAVE ON A TYPICAL DAY: 1 OR 2
HOW OFTEN DO YOU HAVE A DRINK CONTAINING ALCOHOL: MONTHLY OR LESS

## 2025-07-09 ASSESSMENT — PAIN DESCRIPTION - ORIENTATION: ORIENTATION: LEFT;RIGHT;MID

## 2025-07-09 ASSESSMENT — PAIN SCALES - GENERAL
PAINLEVEL_OUTOF10: 5
PAINLEVEL_OUTOF10: 10
PAINLEVEL_OUTOF10: 10

## 2025-07-09 ASSESSMENT — PAIN DESCRIPTION - LOCATION
LOCATION: ABDOMEN
LOCATION: ABDOMEN

## 2025-07-09 ASSESSMENT — PAIN DESCRIPTION - DESCRIPTORS: DESCRIPTORS: SHARP;STABBING;THROBBING

## 2025-07-09 NOTE — ED PROVIDER NOTES
[facility] EMERGENCY DEPT  EMERGENCY DEPARTMENT HISTORY AND PHYSICAL EXAM      Date of evaluation: 7/9/2025  Patient Name: Melissa Ham  Birthdate 2000  MRN: [id number]  ED Provider: Jose R Kaplan DO   Note Started: 9:47 AM EDT 7/9/25    HISTORY OF PRESENT ILLNESS     Chief Complaint   Patient presents with    Abdominal Pain       History Provided By: Patient, only     HPI:   Patient is a 25-year-old female with a past medical history of asymptomatic HIV infection, bipolar 1 disorder, psychosis, H. pylori infection presenting with a chief complaint of abdominal pain.  Pain is located primarily in the middle of her abdomen both the left and the right lower quadrants. Pain has been going on for the last week or more.  Says it began after she drink a shot of alcohol that seem to trigger the pain.  She was seen about 1 month ago where she had a CT scan that demonstrated findings of colitis.  Completed antibiotic treatment.  Denies any fevers or chills, has occasional vomiting but is not very frequent, occasional loose stool but no ongoing diarrhea          PAST MEDICAL HISTORY   Past Medical History:  Past Medical History:   Diagnosis Date    HIV (human immunodeficiency virus infection) (HCC)        Past Surgical History:  Past Surgical History:   Procedure Laterality Date    EYE MUSCLE SURGERY Left 1/17/2024    LEFT EYE MUSCLE REPAIR performed by Navid Tyson MD at [facility] AMBULATORY OR    EYE SURGERY      UPPER GASTROINTESTINAL ENDOSCOPY N/A 5/16/2023    EGD BIOPSY performed by Magali Moss MD at [facility] ENDOSCOPY       Family History:  Family History   Family history unknown: Yes       Social History:  Social History     Tobacco Use    Smoking status: Never     Passive exposure: Never    Smokeless tobacco: Never   Vaping Use    Vaping status: Never Used   Substance Use Topics    Alcohol use: Never    Drug use: Yes     Types: Marijuana (Weed)     Comment: twice a week       Allergies:  No Known Allergies    PCP: No,

## 2025-07-09 NOTE — ED TRIAGE NOTES
Pt to ED from home with c/o abd pain.  Pt describes pain as sharp (needle-point) \"pulsing\" pain that started on L side of abd but now feels it throughout abd.  Rated 10/10 pain.  Last BM was yesterday.  Pt reports one episode of vomiting, + nausea, denies any diarrhea/dysuria/hematuria.  LMP ~06/16/2025.  Pt reports that she is not eating as much as usual.  Pt A&O x 4, ambulatory, able to speak in full and clear sentences.

## 2025-07-09 NOTE — DISCHARGE INSTRUCTIONS
Follow-up with your primary care doctor.  If you do not have 1 use one of the attached resources below.  I have also included the GI doctor options at [facility] for follow-up.      If your symptoms are worsening in the meantime or you develop chest pain, shortness of breath worsening abdominal pain or pain the pain migrates to the right lower abdomen high fevers or intractable symptoms please return.  I sent you a few options for medication use at home.        Thank you for choosing our Emergency Department for your care.  It is our privilege to care for you in your time of need.  In the next several days, you may receive a survey via email or mailed to your home about your experience with our team.  We would greatly appreciate you taking a few minutes to complete the survey, as we use this information to learn what we have done well and what we could be doing better. Thank you for trusting us with your care!    Below you will find a list of your tests from today's visit.   Labs and Radiology Studies  Recent Results (from the past 12 hours)   Comprehensive Metabolic Panel    Collection Time: 07/09/25 10:18 AM   Result Value Ref Range    Sodium 138 136 - 145 mmol/L    Potassium 3.8 3.5 - 5.1 mmol/L    Chloride 109 (H) 97 - 108 mmol/L    CO2 23 21 - 32 mmol/L    Anion Gap 6 2 - 12 mmol/L    Glucose 74 65 - 100 mg/dL    BUN 9 6 - 20 mg/dL    Creatinine 0.83 0.55 - 1.02 mg/dL    BUN/Creatinine Ratio 11 (L) 12 - 20      Est, Glom Filt Rate >90 >60 ml/min/1.73m2    Calcium 9.6 8.5 - 10.1 mg/dL    Total Bilirubin 0.6 0.2 - 1.0 mg/dL    AST 13 (L) 15 - 37 U/L    ALT 15 12 - 78 U/L    Alk Phosphatase 54 45 - 117 U/L    Total Protein 7.5 6.4 - 8.2 g/dL    Albumin 3.8 3.5 - 5.0 g/dL    Globulin 3.7 2.0 - 4.0 g/dL    Albumin/Globulin Ratio 1.0 (L) 1.1 - 2.2     Lipase    Collection Time: 07/09/25 10:18 AM   Result Value Ref Range    Lipase 19 13 - 75 U/L   HCG Qualitative, Serum    Collection Time: 07/09/25 10:18 AM   Result

## 2025-07-25 ENCOUNTER — TELEPHONE (OUTPATIENT)
Facility: CLINIC | Age: 25
End: 2025-07-25

## 2025-07-25 NOTE — TELEPHONE ENCOUNTER
----- Message from Abhay BARR sent at 7/25/2025  1:51 PM EDT -----  Regarding: [facility] Appointment Request  [facility] Appointment Request    Patient needs appointment for [facility] Appointment Type: New Patient.    Patient Requested Dates(s):As soon as possible   Patient Requested Time:Any time   Provider Name:Jamee Brandon MD    Reason for Appointment Request: New Patient - Available appointments did not meet patient need  --------------------------------------------------------------------------------------------------------------------------    Relationship to Patient: Self     Call Back Information: OK to leave message on voicemail  Preferred Call Back Number: Phone [phone]

## 2025-07-25 NOTE — TELEPHONE ENCOUNTER
----- Message from Abhay BARR sent at 7/25/2025  1:51 PM EDT -----  Regarding: [facility] Appointment Request  [facility] Appointment Request    Patient needs appointment for [facility] Appointment Type: New Patient.    Patient Requested Dates(s):As soon as possible   Patient Requested Time:Any time   Provider Name:Jamee Brandon MD    Reason for Appointment Request: New Patient - Available appointments did not meet patient need  --------------------------------------------------------------------------------------------------------------------------    Relationship to Patient: Self     Call Back Information: OK to leave message on voicemail  Preferred Call Back Number: Phone [phone]

## 2025-08-17 ENCOUNTER — HOSPITAL ENCOUNTER (EMERGENCY)
Facility: HOSPITAL | Age: 25
Discharge: HOME | End: 2025-08-17
Payer: MEDICAID

## 2025-08-17 VITALS
TEMPERATURE: 97.8 F | HEIGHT: 67 IN | HEART RATE: 66 BPM | WEIGHT: 115 LBS | DIASTOLIC BLOOD PRESSURE: 58 MMHG | RESPIRATION RATE: 16 BRPM | SYSTOLIC BLOOD PRESSURE: 100 MMHG | OXYGEN SATURATION: 100 % | BODY MASS INDEX: 18.05 KG/M2

## 2025-08-17 DIAGNOSIS — R10.84 GENERALIZED ABDOMINAL PAIN: Primary | ICD-10-CM

## 2025-08-17 DIAGNOSIS — F12.10 MARIJUANA ABUSE: ICD-10-CM

## 2025-08-17 DIAGNOSIS — R07.9 CHEST PAIN, UNSPECIFIED TYPE: ICD-10-CM

## 2025-08-17 DIAGNOSIS — R11.2 NAUSEA AND VOMITING, UNSPECIFIED VOMITING TYPE: ICD-10-CM

## 2025-08-17 LAB
ALBUMIN SERPL-MCNC: 4 G/DL (ref 3.5–5)
ALBUMIN SERPL-MCNC: 4 G/DL (ref 3.5–5)
ALBUMIN SERPL-MCNC: 4.2 G/DL (ref 3.5–5)
ALBUMIN/GLOB SERPL: 1 (ref 1.1–2.2)
ALBUMIN/GLOB SERPL: 1 (ref 1.1–2.2)
ALBUMIN/GLOB SERPL: 1.1 (ref 1.1–2.2)
ALP SERPL-CCNC: 56 U/L (ref 45–117)
ALP SERPL-CCNC: 56 U/L (ref 45–117)
ALP SERPL-CCNC: 63 U/L (ref 45–117)
ALT SERPL-CCNC: 24 U/L (ref 12–78)
ALT SERPL-CCNC: ABNORMAL U/L (ref 12–78)
ALT SERPL-CCNC: ABNORMAL U/L (ref 12–78)
ANION GAP SERPL CALC-SCNC: 6 MMOL/L (ref 2–12)
ANION GAP SERPL CALC-SCNC: 7 MMOL/L (ref 2–12)
ANION GAP SERPL CALC-SCNC: 7 MMOL/L (ref 2–12)
APPEARANCE UR: CLEAR
AST SERPL W P-5'-P-CCNC: 19 U/L (ref 15–37)
AST SERPL W P-5'-P-CCNC: ABNORMAL U/L (ref 15–37)
AST SERPL W P-5'-P-CCNC: ABNORMAL U/L (ref 15–37)
BACTERIA URNS QL MICRO: NEGATIVE /HPF
BASOPHILS # BLD: 0.02 K/UL (ref 0–0.1)
BASOPHILS NFR BLD: 0.2 % (ref 0–1)
BILIRUB SERPL-MCNC: 0.4 MG/DL (ref 0.2–1)
BILIRUB SERPL-MCNC: 0.5 MG/DL (ref 0.2–1)
BILIRUB SERPL-MCNC: 0.6 MG/DL (ref 0.2–1)
BILIRUB UR QL: NEGATIVE
BUN SERPL-MCNC: 17 MG/DL (ref 6–20)
BUN SERPL-MCNC: 18 MG/DL (ref 6–20)
BUN SERPL-MCNC: 19 MG/DL (ref 6–20)
BUN/CREAT SERPL: 16 (ref 12–20)
BUN/CREAT SERPL: 17 (ref 12–20)
BUN/CREAT SERPL: 18 (ref 12–20)
CA-I BLD-MCNC: 10.1 MG/DL (ref 8.5–10.1)
CA-I BLD-MCNC: 9.5 MG/DL (ref 8.5–10.1)
CA-I BLD-MCNC: 9.8 MG/DL (ref 8.5–10.1)
CHLORIDE SERPL-SCNC: 109 MMOL/L (ref 97–108)
CHLORIDE SERPL-SCNC: 110 MMOL/L (ref 97–108)
CHLORIDE SERPL-SCNC: 111 MMOL/L (ref 97–108)
CO2 SERPL-SCNC: 21 MMOL/L (ref 21–32)
CO2 SERPL-SCNC: 22 MMOL/L (ref 21–32)
CO2 SERPL-SCNC: 23 MMOL/L (ref 21–32)
COLOR UR: ABNORMAL
CREAT SERPL-MCNC: 1.01 MG/DL (ref 0.55–1.02)
CREAT SERPL-MCNC: 1.04 MG/DL (ref 0.55–1.02)
CREAT SERPL-MCNC: 1.1 MG/DL (ref 0.55–1.02)
DIFFERENTIAL METHOD BLD: ABNORMAL
EOSINOPHIL # BLD: 0.03 K/UL (ref 0–0.4)
EOSINOPHIL NFR BLD: 0.3 % (ref 0–7)
EPITH CASTS URNS QL MICRO: ABNORMAL /LPF
ERYTHROCYTE [DISTWIDTH] IN BLOOD BY AUTOMATED COUNT: 12.5 % (ref 11.5–14.5)
GLOBULIN SER CALC-MCNC: 4 G/DL (ref 2–4)
GLOBULIN SER CALC-MCNC: 4.1 G/DL (ref 2–4)
GLOBULIN SER CALC-MCNC: 4.2 G/DL (ref 2–4)
GLUCOSE SERPL-MCNC: 116 MG/DL (ref 65–100)
GLUCOSE SERPL-MCNC: 134 MG/DL (ref 65–100)
GLUCOSE SERPL-MCNC: 135 MG/DL (ref 65–100)
GLUCOSE UR STRIP.AUTO-MCNC: NEGATIVE MG/DL
HCG UR QL: NEGATIVE
HCT VFR BLD AUTO: 36.1 % (ref 35–47)
HGB BLD-MCNC: 12.4 G/DL (ref 11.5–16)
HGB UR QL STRIP: NEGATIVE
IMM GRANULOCYTES # BLD AUTO: 0.05 K/UL (ref 0–0.04)
IMM GRANULOCYTES NFR BLD AUTO: 0.5 % (ref 0–0.5)
KETONES UR QL STRIP.AUTO: ABNORMAL MG/DL
LEUKOCYTE ESTERASE UR QL STRIP.AUTO: NEGATIVE
LIPASE SERPL-CCNC: 9 U/L (ref 13–75)
LYMPHOCYTES # BLD: 1.28 K/UL (ref 0.8–3.5)
LYMPHOCYTES NFR BLD: 13.3 % (ref 12–49)
MCH RBC QN AUTO: 33.9 PG (ref 26–34)
MCHC RBC AUTO-ENTMCNC: 34.3 G/DL (ref 30–36.5)
MCV RBC AUTO: 98.6 FL (ref 80–99)
MONOCYTES # BLD: 0.25 K/UL (ref 0–1)
MONOCYTES NFR BLD: 2.6 % (ref 5–13)
MUCOUS THREADS URNS QL MICRO: ABNORMAL /LPF
NEUTS SEG # BLD: 7.96 K/UL (ref 1.8–8)
NEUTS SEG NFR BLD: 83.1 % (ref 32–75)
NITRITE UR QL STRIP.AUTO: NEGATIVE
NRBC # BLD: 0 K/UL (ref 0–0.01)
NRBC BLD-RTO: 0 PER 100 WBC
PH UR STRIP: 7
PLATELET # BLD AUTO: 274 K/UL (ref 150–400)
PMV BLD AUTO: 11.3 FL (ref 8.9–12.9)
POTASSIUM SERPL-SCNC: 3.8 MMOL/L (ref 3.5–5.1)
POTASSIUM SERPL-SCNC: ABNORMAL MMOL/L (ref 3.5–5.1)
POTASSIUM SERPL-SCNC: ABNORMAL MMOL/L (ref 3.5–5.1)
PROT SERPL-MCNC: 8.1 G/DL (ref 6.4–8.2)
PROT SERPL-MCNC: 8.2 G/DL (ref 6.4–8.2)
PROT SERPL-MCNC: 8.2 G/DL (ref 6.4–8.2)
PROT UR STRIP-MCNC: NEGATIVE MG/DL
RBC # BLD AUTO: 3.66 M/UL (ref 3.8–5.2)
RBC #/AREA URNS HPF: ABNORMAL /HPF (ref 0–5)
SODIUM SERPL-SCNC: 137 MMOL/L (ref 136–145)
SODIUM SERPL-SCNC: 139 MMOL/L (ref 136–145)
SODIUM SERPL-SCNC: 140 MMOL/L (ref 136–145)
SP GR UR REFRACTOMETRY: 1.01 (ref 1–1.03)
TROPONIN I SERPL HS-MCNC: <4 NG/L (ref 0–51)
TROPONIN I SERPL HS-MCNC: <4 NG/L (ref 0–51)
URINE CULTURE IF INDICATED: ABNORMAL
UROBILINOGEN UR QL STRIP.AUTO: 0.1 EU/DL (ref 0.2–1)
WBC # BLD AUTO: 9.6 K/UL (ref 3.6–11)
WBC URNS QL MICRO: ABNORMAL /HPF (ref 0–4)

## 2025-08-17 PROCEDURE — 84484 ASSAY OF TROPONIN QUANT: CPT

## 2025-08-17 PROCEDURE — 96375 TX/PRO/DX INJ NEW DRUG ADDON: CPT

## 2025-08-17 PROCEDURE — 85025 COMPLETE CBC W/AUTO DIFF WBC: CPT

## 2025-08-17 PROCEDURE — 81001 URINALYSIS AUTO W/SCOPE: CPT

## 2025-08-17 PROCEDURE — 80053 COMPREHEN METABOLIC PANEL: CPT

## 2025-08-17 PROCEDURE — 96374 THER/PROPH/DIAG INJ IV PUSH: CPT

## 2025-08-17 PROCEDURE — 99284 EMERGENCY DEPT VISIT MOD MDM: CPT

## 2025-08-17 PROCEDURE — 36415 COLL VENOUS BLD VENIPUNCTURE: CPT

## 2025-08-17 PROCEDURE — 93005 ELECTROCARDIOGRAM TRACING: CPT

## 2025-08-17 PROCEDURE — 96361 HYDRATE IV INFUSION ADD-ON: CPT

## 2025-08-17 PROCEDURE — 2580000003 HC RX 258

## 2025-08-17 PROCEDURE — 6360000002 HC RX W HCPCS

## 2025-08-17 PROCEDURE — 83690 ASSAY OF LIPASE: CPT

## 2025-08-17 PROCEDURE — 96376 TX/PRO/DX INJ SAME DRUG ADON: CPT

## 2025-08-17 PROCEDURE — 81025 URINE PREGNANCY TEST: CPT

## 2025-08-17 RX ORDER — KETOROLAC TROMETHAMINE 15 MG/ML
15 INJECTION, SOLUTION INTRAMUSCULAR; INTRAVENOUS
Status: COMPLETED | OUTPATIENT
Start: 2025-08-17 | End: 2025-08-17

## 2025-08-17 RX ORDER — ONDANSETRON 2 MG/ML
4 INJECTION INTRAMUSCULAR; INTRAVENOUS ONCE
Status: DISCONTINUED | OUTPATIENT
Start: 2025-08-17 | End: 2025-08-17 | Stop reason: HOSPADM

## 2025-08-17 RX ORDER — ONDANSETRON 2 MG/ML
4 INJECTION INTRAMUSCULAR; INTRAVENOUS EVERY 6 HOURS PRN
Status: DISCONTINUED | OUTPATIENT
Start: 2025-08-17 | End: 2025-08-17 | Stop reason: HOSPADM

## 2025-08-17 RX ORDER — 0.9 % SODIUM CHLORIDE 0.9 %
1000 INTRAVENOUS SOLUTION INTRAVENOUS
Status: COMPLETED | OUTPATIENT
Start: 2025-08-17 | End: 2025-08-17

## 2025-08-17 RX ORDER — MORPHINE SULFATE 4 MG/ML
4 INJECTION, SOLUTION INTRAMUSCULAR; INTRAVENOUS
Status: COMPLETED | OUTPATIENT
Start: 2025-08-17 | End: 2025-08-17

## 2025-08-17 RX ORDER — HALOPERIDOL 5 MG/ML
5 INJECTION INTRAMUSCULAR ONCE
Status: COMPLETED | OUTPATIENT
Start: 2025-08-17 | End: 2025-08-17

## 2025-08-17 RX ADMIN — HALOPERIDOL LACTATE 5 MG: 5 INJECTION, SOLUTION INTRAMUSCULAR at 11:22

## 2025-08-17 RX ADMIN — KETOROLAC TROMETHAMINE 15 MG: 15 INJECTION, SOLUTION INTRAMUSCULAR; INTRAVENOUS at 14:39

## 2025-08-17 RX ADMIN — SODIUM CHLORIDE 1000 ML: 0.9 INJECTION, SOLUTION INTRAVENOUS at 11:00

## 2025-08-17 RX ADMIN — MORPHINE SULFATE 4 MG: 4 INJECTION, SOLUTION INTRAMUSCULAR; INTRAVENOUS at 10:28

## 2025-08-17 RX ADMIN — ONDANSETRON 4 MG: 2 INJECTION, SOLUTION INTRAMUSCULAR; INTRAVENOUS at 10:28

## 2025-08-17 RX ADMIN — ONDANSETRON 4 MG: 2 INJECTION, SOLUTION INTRAMUSCULAR; INTRAVENOUS at 14:38

## 2025-08-17 ASSESSMENT — PAIN DESCRIPTION - LOCATION
LOCATION: ABDOMEN;CHEST;HEAD
LOCATION: ABDOMEN
LOCATION: ABDOMEN

## 2025-08-17 ASSESSMENT — PAIN SCALES - GENERAL
PAINLEVEL_OUTOF10: 5
PAINLEVEL_OUTOF10: 0
PAINLEVEL_OUTOF10: 10

## 2025-08-17 ASSESSMENT — PAIN - FUNCTIONAL ASSESSMENT
PAIN_FUNCTIONAL_ASSESSMENT: 0-10

## 2025-08-17 ASSESSMENT — HEART SCORE: ECG: NORMAL

## 2025-08-17 ASSESSMENT — PAIN DESCRIPTION - ORIENTATION: ORIENTATION: RIGHT

## 2025-08-17 ASSESSMENT — PAIN DESCRIPTION - PAIN TYPE: TYPE: ACUTE PAIN

## 2025-08-18 LAB
EKG ATRIAL RATE: 61 BPM
EKG DIAGNOSIS: NORMAL
EKG P AXIS: 8 DEGREES
EKG P-R INTERVAL: 148 MS
EKG Q-T INTERVAL: 474 MS
EKG QRS DURATION: 80 MS
EKG QTC CALCULATION (BAZETT): 477 MS
EKG R AXIS: 76 DEGREES
EKG T AXIS: 44 DEGREES
EKG VENTRICULAR RATE: 61 BPM

## 2025-09-02 ENCOUNTER — OFFICE VISIT (OUTPATIENT)
Facility: CLINIC | Age: 25
End: 2025-09-02
Payer: MEDICAID

## 2025-09-02 VITALS
OXYGEN SATURATION: 97 % | RESPIRATION RATE: 18 BRPM | HEIGHT: 67 IN | WEIGHT: 113.2 LBS | HEART RATE: 62 BPM | DIASTOLIC BLOOD PRESSURE: 60 MMHG | TEMPERATURE: 98.1 F | BODY MASS INDEX: 17.77 KG/M2 | SYSTOLIC BLOOD PRESSURE: 97 MMHG

## 2025-09-02 DIAGNOSIS — R10.30 LOWER ABDOMINAL PAIN: ICD-10-CM

## 2025-09-02 DIAGNOSIS — Z21 ASYMPTOMATIC HIV INFECTION (HCC): ICD-10-CM

## 2025-09-02 DIAGNOSIS — Z78.9 HISTORY OF MEASLES, MUMPS, RUBELLA (MMR) VACCINATION UNKNOWN: ICD-10-CM

## 2025-09-02 DIAGNOSIS — Z11.3 SCREEN FOR STD (SEXUALLY TRANSMITTED DISEASE): ICD-10-CM

## 2025-09-02 DIAGNOSIS — Z78.9 HEPATITIS B VACCINATION STATUS UNKNOWN: ICD-10-CM

## 2025-09-02 DIAGNOSIS — T78.40XA ALLERGY, INITIAL ENCOUNTER: ICD-10-CM

## 2025-09-02 DIAGNOSIS — Z28.39 IMMUNIZATIONS INCOMPLETE: ICD-10-CM

## 2025-09-02 DIAGNOSIS — Z00.00 PREVENTATIVE HEALTH CARE: ICD-10-CM

## 2025-09-02 DIAGNOSIS — Z76.89 ENCOUNTER TO ESTABLISH CARE: Primary | ICD-10-CM

## 2025-09-02 DIAGNOSIS — Z78.9 VARICELLA VACCINATION STATUS UNKNOWN: ICD-10-CM

## 2025-09-02 DIAGNOSIS — R45.89 DEPRESSED MOOD: ICD-10-CM

## 2025-09-02 PROCEDURE — 99204 OFFICE O/P NEW MOD 45 MIN: CPT | Performed by: STUDENT IN AN ORGANIZED HEALTH CARE EDUCATION/TRAINING PROGRAM

## 2025-09-02 RX ORDER — CETIRIZINE HYDROCHLORIDE 10 MG/1
10 TABLET ORAL DAILY
Qty: 30 TABLET | Refills: 1 | Status: SHIPPED | OUTPATIENT
Start: 2025-09-02

## 2025-09-02 SDOH — ECONOMIC STABILITY: FOOD INSECURITY: WITHIN THE PAST 12 MONTHS, THE FOOD YOU BOUGHT JUST DIDN'T LAST AND YOU DIDN'T HAVE MONEY TO GET MORE.: NEVER TRUE

## 2025-09-02 SDOH — ECONOMIC STABILITY: FOOD INSECURITY: WITHIN THE PAST 12 MONTHS, YOU WORRIED THAT YOUR FOOD WOULD RUN OUT BEFORE YOU GOT MONEY TO BUY MORE.: NEVER TRUE

## 2025-09-02 ASSESSMENT — ENCOUNTER SYMPTOMS
SHORTNESS OF BREATH: 0
SORE THROAT: 0
WHEEZING: 0
NAUSEA: 0
ABDOMINAL PAIN: 1
ABDOMINAL DISTENTION: 0
VOMITING: 0
CONSTIPATION: 0
COUGH: 0
DIARRHEA: 0

## 2025-09-02 ASSESSMENT — PATIENT HEALTH QUESTIONNAIRE - PHQ9
4. FEELING TIRED OR HAVING LITTLE ENERGY: NOT AT ALL
5. POOR APPETITE OR OVEREATING: NOT AT ALL
SUM OF ALL RESPONSES TO PHQ QUESTIONS 1-9: 0
9. THOUGHTS THAT YOU WOULD BE BETTER OFF DEAD, OR OF HURTING YOURSELF: NOT AT ALL
SUM OF ALL RESPONSES TO PHQ QUESTIONS 1-9: 0
6. FEELING BAD ABOUT YOURSELF - OR THAT YOU ARE A FAILURE OR HAVE LET YOURSELF OR YOUR FAMILY DOWN: NOT AT ALL
SUM OF ALL RESPONSES TO PHQ QUESTIONS 1-9: 0
7. TROUBLE CONCENTRATING ON THINGS, SUCH AS READING THE NEWSPAPER OR WATCHING TELEVISION: NOT AT ALL
1. LITTLE INTEREST OR PLEASURE IN DOING THINGS: NOT AT ALL
2. FEELING DOWN, DEPRESSED OR HOPELESS: NOT AT ALL
3. TROUBLE FALLING OR STAYING ASLEEP: NOT AT ALL
SUM OF ALL RESPONSES TO PHQ QUESTIONS 1-9: 0
10. IF YOU CHECKED OFF ANY PROBLEMS, HOW DIFFICULT HAVE THESE PROBLEMS MADE IT FOR YOU TO DO YOUR WORK, TAKE CARE OF THINGS AT HOME, OR GET ALONG WITH OTHER PEOPLE: NOT DIFFICULT AT ALL
8. MOVING OR SPEAKING SO SLOWLY THAT OTHER PEOPLE COULD HAVE NOTICED. OR THE OPPOSITE, BEING SO FIGETY OR RESTLESS THAT YOU HAVE BEEN MOVING AROUND A LOT MORE THAN USUAL: NOT AT ALL

## 2025-09-06 LAB — HCV AB SERPL QL IA: NORMAL

## 2025-09-07 LAB
C TRACH RRNA SPEC QL NAA+PROBE: NEGATIVE
N GONORRHOEA RRNA SPEC QL NAA+PROBE: NEGATIVE
T VAGINALIS RRNA SPEC QL NAA+PROBE: NEGATIVE

## (undated) LAB
ALBUMIN SERPL-MCNC: 3.7 G/DL (ref 3.5–5)
ALBUMIN/GLOB SERPL: 0.9 (ref 1.1–2.2)
ALP SERPL-CCNC: 43 U/L (ref 45–117)
ALT SERPL-CCNC: 15 U/L (ref 12–78)
ANION GAP SERPL CALC-SCNC: 9 MMOL/L (ref 5–15)
APPEARANCE UR: CLEAR
AST SERPL W P-5'-P-CCNC: 18 U/L (ref 15–37)
BACTERIA URNS QL MICRO: NEGATIVE /HPF
BASOPHILS # BLD: 0 K/UL (ref 0–0.1)
BASOPHILS NFR BLD: 1 % (ref 0–1)
BILIRUB SERPL-MCNC: 0.5 MG/DL (ref 0.2–1)
BILIRUB UR QL: NEGATIVE
BUN SERPL-MCNC: 12 MG/DL (ref 6–20)
BUN/CREAT SERPL: 14 (ref 12–20)
CA-I BLD-MCNC: 9.3 MG/DL (ref 8.5–10.1)
CHLORIDE SERPL-SCNC: 102 MMOL/L (ref 97–108)
CO2 SERPL-SCNC: 27 MMOL/L (ref 21–32)
COLOR UR: (no result)
CREAT SERPL-MCNC: 0.87 MG/DL (ref 0.55–1.02)
DIFFERENTIAL METHOD BLD: (no result)
EOSINOPHIL # BLD: 0.1 K/UL (ref 0–0.4)
EOSINOPHIL NFR BLD: 3 % (ref 0–7)
EPITH CASTS URNS QL MICRO: (no result) /LPF
ERYTHROCYTE [DISTWIDTH] IN BLOOD BY AUTOMATED COUNT: 12.1 % (ref 11.5–14.5)
GLOBULIN SER CALC-MCNC: 4.1 G/DL (ref 2–4)
GLUCOSE SERPL-MCNC: 82 MG/DL (ref 65–100)
GLUCOSE UR STRIP.AUTO-MCNC: NEGATIVE MG/DL
HCT VFR BLD AUTO: 37.7 % (ref 35–47)
HGB BLD-MCNC: 12.6 G/DL (ref 11.5–16)
HGB UR QL STRIP: NEGATIVE
IMM GRANULOCYTES # BLD AUTO: 0 K/UL (ref 0–0.04)
IMM GRANULOCYTES NFR BLD AUTO: 0 % (ref 0–0.5)
KETONES UR QL STRIP.AUTO: NEGATIVE MG/DL
LEUKOCYTE ESTERASE UR QL STRIP.AUTO: NEGATIVE
LIPASE SERPL-CCNC: 65 U/L (ref 73–393)
LYMPHOCYTES # BLD: 2 K/UL (ref 0.8–3.5)
LYMPHOCYTES NFR BLD: 52 % (ref 12–49)
MCH RBC QN AUTO: 34.1 PG (ref 26–34)
MCHC RBC AUTO-ENTMCNC: 33.4 G/DL (ref 30–36.5)
MCV RBC AUTO: 102.2 FL (ref 80–99)
MONOCYTES # BLD: 0.2 K/UL (ref 0–1)
MONOCYTES NFR BLD: 4 % (ref 5–13)
NEUTS SEG # BLD: 1.5 K/UL (ref 1.8–8)
NEUTS SEG NFR BLD: 40 % (ref 32–75)
NITRITE UR QL STRIP.AUTO: NEGATIVE
PH UR STRIP: 5 (ref 5–8)
PLATELET # BLD AUTO: 237 K/UL (ref 150–400)
PMV BLD AUTO: 9.6 FL (ref 8.9–12.9)
POTASSIUM SERPL-SCNC: 4.2 MMOL/L (ref 3.5–5.1)
PROT SERPL-MCNC: 7.8 G/DL (ref 6.4–8.2)
PROT UR STRIP-MCNC: NEGATIVE MG/DL
RBC # BLD AUTO: 3.69 M/UL (ref 3.8–5.2)
RBC #/AREA URNS HPF: (no result) /HPF (ref 0–5)
SODIUM SERPL-SCNC: 138 MMOL/L (ref 136–145)
SP GR UR REFRACTOMETRY: 1.01 (ref 1–1.03)
UA: UC IF INDICATED,UAUC: (no result)
UROBILINOGEN UR QL STRIP.AUTO: 0.1 EU/DL (ref 0.2–1)
WBC # BLD AUTO: 3.8 K/UL (ref 3.6–11)
WBC URNS QL MICRO: (no result) /HPF (ref 0–4)

## (undated) DEVICE — Device

## (undated) DEVICE — GOWN,REINF,POLY,ECL,PP SLV,XXL: Brand: MEDLINE

## (undated) DEVICE — STERILE POLYISOPRENE POWDER-FREE SURGICAL GLOVES: Brand: PROTEXIS

## (undated) DEVICE — MOUTHPIECE ENDOSCP L CTRL OPN AND SIDE PORTS DISP

## (undated) DEVICE — ENDOSCOPIC KIT 1.1+ 6 FT OP3 DE

## (undated) DEVICE — SOLUTION IRRIG 1000ML 0.9% SOD CHL USP POUR PLAS BTL

## (undated) DEVICE — GARMENT,MEDLINE,DVT,INT,CALF,MED, GEN2: Brand: MEDLINE

## (undated) DEVICE — SINGLE-USE BIOPSY FORCEPS: Brand: RADIAL JAW 4

## (undated) DEVICE — MARKER SURG SKIN UTIL REGULAR/FINE 2 TIP W/ RUL AND 9 LBL

## (undated) DEVICE — FORCEPS BX L240CM JAW DIA2.8MM L CAP W/ NDL MIC MESH TOOTH

## (undated) DEVICE — SWAB SURG L76CM COT ROUNDED PT TIP VISISWAB

## (undated) DEVICE — INTENT OT USE PROVIDES A STERILE INTERFACE BETWEEN THE OPERATING ROOM SURGICAL LAMPS (NON-STERILE) AND THE SURGEON OR STAFF WORKING IN THE STERILE FIELD.: Brand: ASPEN® ALC PLUS LIGHT HANDLE COVER

## (undated) DEVICE — SUTURE COAT VCRL SZ 6-0 L18IN ABSRB VLT S-29 L7.6MM 1/4 CIR J555G

## (undated) DEVICE — SUTURE COAT VCRL SZ 8-0 L8IN ABSRB VLT TG140-8 L6.5MM 3/8 J547G